# Patient Record
Sex: FEMALE | Race: WHITE | Employment: UNEMPLOYED | URBAN - METROPOLITAN AREA
[De-identification: names, ages, dates, MRNs, and addresses within clinical notes are randomized per-mention and may not be internally consistent; named-entity substitution may affect disease eponyms.]

---

## 2017-01-11 ENCOUNTER — OFFICE VISIT (OUTPATIENT)
Dept: CARDIOLOGY CLINIC | Age: 57
End: 2017-01-11

## 2017-01-11 VITALS
HEART RATE: 80 BPM | HEIGHT: 64 IN | BODY MASS INDEX: 29.88 KG/M2 | SYSTOLIC BLOOD PRESSURE: 118 MMHG | WEIGHT: 175 LBS | DIASTOLIC BLOOD PRESSURE: 70 MMHG

## 2017-01-11 DIAGNOSIS — I50.22 CHRONIC SYSTOLIC CONGESTIVE HEART FAILURE (HCC): ICD-10-CM

## 2017-01-11 DIAGNOSIS — I21.4 NSTEMI (NON-ST ELEVATED MYOCARDIAL INFARCTION) (HCC): ICD-10-CM

## 2017-01-11 DIAGNOSIS — J44.9 CHRONIC OBSTRUCTIVE PULMONARY DISEASE, UNSPECIFIED COPD TYPE (HCC): ICD-10-CM

## 2017-01-11 DIAGNOSIS — R60.0 BILATERAL EDEMA OF LOWER EXTREMITY: ICD-10-CM

## 2017-01-11 DIAGNOSIS — E03.9 ACQUIRED HYPOTHYROIDISM: ICD-10-CM

## 2017-01-11 DIAGNOSIS — E78.5 DYSLIPIDEMIA: ICD-10-CM

## 2017-01-11 DIAGNOSIS — I25.110 CORONARY ARTERY DISEASE INVOLVING NATIVE CORONARY ARTERY OF NATIVE HEART WITH UNSTABLE ANGINA PECTORIS (HCC): Primary | ICD-10-CM

## 2017-01-11 DIAGNOSIS — E66.09 NON MORBID OBESITY DUE TO EXCESS CALORIES: ICD-10-CM

## 2017-01-11 DIAGNOSIS — R06.02 SHORTNESS OF BREATH: ICD-10-CM

## 2017-01-11 DIAGNOSIS — F32.1 MODERATE SINGLE CURRENT EPISODE OF MAJOR DEPRESSIVE DISORDER (HCC): ICD-10-CM

## 2017-01-11 PROCEDURE — 99214 OFFICE O/P EST MOD 30 MIN: CPT | Performed by: INTERNAL MEDICINE

## 2017-01-11 RX ORDER — MIDODRINE HYDROCHLORIDE 5 MG/1
10 TABLET ORAL
Qty: 180 TABLET | Refills: 6 | Status: SHIPPED | OUTPATIENT
Start: 2017-01-11 | End: 2017-03-07 | Stop reason: SDUPTHER

## 2017-01-11 RX ORDER — CARVEDILOL 3.12 MG/1
3.12 TABLET ORAL 2 TIMES DAILY WITH MEALS
Qty: 60 TABLET | Refills: 6 | Status: SHIPPED | OUTPATIENT
Start: 2017-01-11 | End: 2017-03-07 | Stop reason: SDUPTHER

## 2017-02-28 ENCOUNTER — TELEPHONE (OUTPATIENT)
Dept: CARDIOLOGY CLINIC | Age: 57
End: 2017-02-28

## 2017-03-03 ENCOUNTER — TELEPHONE (OUTPATIENT)
Dept: FAMILY MEDICINE CLINIC | Age: 57
End: 2017-03-03

## 2017-03-03 ENCOUNTER — TELEPHONE (OUTPATIENT)
Dept: CARDIOLOGY CLINIC | Age: 57
End: 2017-03-03

## 2017-03-07 DIAGNOSIS — R06.02 SHORTNESS OF BREATH: ICD-10-CM

## 2017-03-07 RX ORDER — CARVEDILOL 3.12 MG/1
3.12 TABLET ORAL 2 TIMES DAILY WITH MEALS
Qty: 180 TABLET | Refills: 3 | Status: SHIPPED | OUTPATIENT
Start: 2017-03-07 | End: 2017-06-16 | Stop reason: SDUPTHER

## 2017-03-07 RX ORDER — FUROSEMIDE 20 MG/1
20 TABLET ORAL DAILY
Qty: 90 TABLET | Refills: 3 | Status: SHIPPED | OUTPATIENT
Start: 2017-03-07 | End: 2017-06-16 | Stop reason: SDUPTHER

## 2017-03-07 RX ORDER — MIDODRINE HYDROCHLORIDE 5 MG/1
10 TABLET ORAL
Qty: 540 TABLET | Refills: 3 | Status: SHIPPED | OUTPATIENT
Start: 2017-03-07 | End: 2017-06-16 | Stop reason: SDUPTHER

## 2017-03-16 ENCOUNTER — TELEPHONE (OUTPATIENT)
Dept: CARDIOLOGY CLINIC | Age: 57
End: 2017-03-16

## 2017-03-28 ENCOUNTER — TELEPHONE (OUTPATIENT)
Dept: CARDIOLOGY CLINIC | Age: 57
End: 2017-03-28

## 2017-04-03 ENCOUNTER — TELEPHONE (OUTPATIENT)
Dept: CARDIOLOGY CLINIC | Age: 57
End: 2017-04-03

## 2017-04-27 ENCOUNTER — OFFICE VISIT (OUTPATIENT)
Dept: FAMILY MEDICINE CLINIC | Age: 57
End: 2017-04-27

## 2017-04-27 VITALS
TEMPERATURE: 96.8 F | HEART RATE: 62 BPM | WEIGHT: 151.2 LBS | SYSTOLIC BLOOD PRESSURE: 122 MMHG | BODY MASS INDEX: 25.95 KG/M2 | OXYGEN SATURATION: 96 % | DIASTOLIC BLOOD PRESSURE: 62 MMHG

## 2017-04-27 DIAGNOSIS — E66.09 NON MORBID OBESITY DUE TO EXCESS CALORIES: ICD-10-CM

## 2017-04-27 DIAGNOSIS — F41.9 ANXIETY: ICD-10-CM

## 2017-04-27 DIAGNOSIS — F32.1 MODERATE SINGLE CURRENT EPISODE OF MAJOR DEPRESSIVE DISORDER (HCC): ICD-10-CM

## 2017-04-27 DIAGNOSIS — J30.89 PERENNIAL ALLERGIC RHINITIS, UNSPECIFIED ALLERGIC RHINITIS TRIGGER: ICD-10-CM

## 2017-04-27 DIAGNOSIS — E03.4 HYPOTHYROIDISM DUE TO ACQUIRED ATROPHY OF THYROID: ICD-10-CM

## 2017-04-27 DIAGNOSIS — I21.4 NSTEMI (NON-ST ELEVATED MYOCARDIAL INFARCTION) (HCC): ICD-10-CM

## 2017-04-27 DIAGNOSIS — R35.0 URINARY FREQUENCY: ICD-10-CM

## 2017-04-27 DIAGNOSIS — I25.110 CORONARY ARTERY DISEASE INVOLVING NATIVE CORONARY ARTERY OF NATIVE HEART WITH UNSTABLE ANGINA PECTORIS (HCC): ICD-10-CM

## 2017-04-27 DIAGNOSIS — J44.9 CHRONIC OBSTRUCTIVE PULMONARY DISEASE, UNSPECIFIED COPD TYPE (HCC): Primary | ICD-10-CM

## 2017-04-27 DIAGNOSIS — F48.2 PSEUDOBULBAR AFFECT: ICD-10-CM

## 2017-04-27 DIAGNOSIS — E87.6 HYPOKALEMIA: ICD-10-CM

## 2017-04-27 PROCEDURE — 36415 COLL VENOUS BLD VENIPUNCTURE: CPT | Performed by: FAMILY MEDICINE

## 2017-04-27 PROCEDURE — 99214 OFFICE O/P EST MOD 30 MIN: CPT | Performed by: FAMILY MEDICINE

## 2017-04-27 RX ORDER — LEVOTHYROXINE SODIUM 0.1 MG/1
TABLET ORAL
Qty: 30 TABLET | Refills: 5 | Status: SHIPPED | OUTPATIENT
Start: 2017-04-27 | End: 2017-10-10 | Stop reason: SDUPTHER

## 2017-04-27 RX ORDER — FLUTICASONE PROPIONATE 220 UG/1
AEROSOL, METERED RESPIRATORY (INHALATION)
Qty: 3 INHALER | Refills: 1 | Status: SHIPPED | OUTPATIENT
Start: 2017-04-27 | End: 2017-09-12 | Stop reason: SDUPTHER

## 2017-04-27 RX ORDER — RISPERIDONE 1 MG/1
TABLET, FILM COATED ORAL
Qty: 60 TABLET | Refills: 5 | Status: SHIPPED | OUTPATIENT
Start: 2017-04-27 | End: 2017-10-10 | Stop reason: SDUPTHER

## 2017-04-27 RX ORDER — POTASSIUM CHLORIDE 20 MEQ/1
10 TABLET, EXTENDED RELEASE ORAL DAILY
Qty: 30 TABLET | Refills: 5 | Status: SHIPPED | OUTPATIENT
Start: 2017-04-27 | End: 2017-10-10 | Stop reason: SDUPTHER

## 2017-04-27 RX ORDER — ATORVASTATIN CALCIUM 40 MG/1
40 TABLET, FILM COATED ORAL DAILY
Qty: 30 TABLET | Refills: 6 | Status: SHIPPED | OUTPATIENT
Start: 2017-04-27 | End: 2017-06-16 | Stop reason: SDUPTHER

## 2017-04-27 RX ORDER — ESCITALOPRAM OXALATE 20 MG/1
TABLET ORAL
Qty: 30 TABLET | Refills: 5 | Status: SHIPPED | OUTPATIENT
Start: 2017-04-27 | End: 2017-10-10 | Stop reason: SDUPTHER

## 2017-04-27 RX ORDER — FLUTICASONE PROPIONATE 50 MCG
2 SPRAY, SUSPENSION (ML) NASAL DAILY
Qty: 1 BOTTLE | Refills: 5 | Status: SHIPPED | OUTPATIENT
Start: 2017-04-27 | End: 2017-09-12 | Stop reason: SDUPTHER

## 2017-04-27 RX ORDER — OXYBUTYNIN CHLORIDE 5 MG/1
5 TABLET ORAL 3 TIMES DAILY
Qty: 90 TABLET | Refills: 5 | Status: SHIPPED | OUTPATIENT
Start: 2017-04-27 | End: 2017-10-10 | Stop reason: SDUPTHER

## 2017-04-27 RX ORDER — AMITRIPTYLINE HYDROCHLORIDE 100 MG/1
100 TABLET, FILM COATED ORAL EVERY EVENING
Refills: 2 | COMMUNITY
Start: 2017-04-17

## 2017-04-27 RX ORDER — MEMANTINE HYDROCHLORIDE AND DONEPEZIL HYDROCHLORIDE 28; 10 MG/1; MG/1
CAPSULE ORAL NIGHTLY
Refills: 2 | COMMUNITY
Start: 2017-04-17

## 2017-04-27 RX ORDER — LORAZEPAM 0.5 MG/1
TABLET ORAL
Refills: 0 | COMMUNITY
Start: 2017-03-31 | End: 2017-04-27 | Stop reason: SDUPTHER

## 2017-04-27 RX ORDER — LORAZEPAM 0.5 MG/1
0.5 TABLET ORAL EVERY 6 HOURS PRN
Qty: 30 TABLET | Refills: 1 | Status: SHIPPED | OUTPATIENT
Start: 2017-04-27 | End: 2017-05-27

## 2017-04-27 RX ORDER — BUSPIRONE HYDROCHLORIDE 15 MG/1
15 TABLET ORAL 2 TIMES DAILY
Qty: 60 TABLET | Refills: 5 | Status: SHIPPED | OUTPATIENT
Start: 2017-04-27 | End: 2017-10-10 | Stop reason: SDUPTHER

## 2017-04-28 LAB
A/G RATIO: 1.1 (CALC) (ref 0.8–2.6)
ALBUMIN SERPL-MCNC: 4.3 GM/DL (ref 3.5–5.2)
ALP BLD-CCNC: 141 U/L (ref 23–144)
ALT SERPL-CCNC: 10 U/L (ref 0–60)
AST SERPL-CCNC: 15 U/L (ref 0–46)
BILIRUB SERPL-MCNC: 0.3 MG/DL (ref 0–1.2)
BUN / CREAT RATIO: 17 (CALC) (ref 7–25)
BUN BLDV-MCNC: 15 MG/DL (ref 3–29)
CALCIUM SERPL-MCNC: 10 MG/DL (ref 8.5–10.5)
CHLORIDE BLD-SCNC: 94 MEQ/L (ref 96–110)
CO2: 24 MEQ/L (ref 19–32)
COPY(IES) SENT TO:: NORMAL
CREAT SERPL-MCNC: 0.9 MG/DL
GFR SERPL CREATININE-BSD FRML MDRD: 73 ML/MIN/1.73M2
GLOBULIN: 3.8 GM/DL (CALC) (ref 1.9–3.6)
GLUCOSE BLD-MCNC: 78 MG/DL
POTASSIUM SERPL-SCNC: 3.9 MEQ/L (ref 3.4–5.3)
SODIUM BLD-SCNC: 139 MEQ/L (ref 135–148)
TOTAL PROTEIN: 8.1 GM/DL (ref 6–8.3)

## 2017-06-15 ENCOUNTER — TELEPHONE (OUTPATIENT)
Dept: CARDIOLOGY CLINIC | Age: 57
End: 2017-06-15

## 2017-06-16 ENCOUNTER — OFFICE VISIT (OUTPATIENT)
Dept: CARDIOLOGY CLINIC | Age: 57
End: 2017-06-16

## 2017-06-16 VITALS
SYSTOLIC BLOOD PRESSURE: 108 MMHG | DIASTOLIC BLOOD PRESSURE: 70 MMHG | BODY MASS INDEX: 26.05 KG/M2 | HEART RATE: 76 BPM | WEIGHT: 152.6 LBS | HEIGHT: 64 IN

## 2017-06-16 DIAGNOSIS — E66.09 NON MORBID OBESITY DUE TO EXCESS CALORIES: ICD-10-CM

## 2017-06-16 DIAGNOSIS — R60.0 BILATERAL EDEMA OF LOWER EXTREMITY: ICD-10-CM

## 2017-06-16 DIAGNOSIS — E03.9 ACQUIRED HYPOTHYROIDISM: ICD-10-CM

## 2017-06-16 DIAGNOSIS — I25.110 CORONARY ARTERY DISEASE INVOLVING NATIVE CORONARY ARTERY OF NATIVE HEART WITH UNSTABLE ANGINA PECTORIS (HCC): Primary | ICD-10-CM

## 2017-06-16 DIAGNOSIS — I21.4 NSTEMI (NON-ST ELEVATED MYOCARDIAL INFARCTION) (HCC): ICD-10-CM

## 2017-06-16 DIAGNOSIS — I50.22 CHRONIC SYSTOLIC CONGESTIVE HEART FAILURE (HCC): ICD-10-CM

## 2017-06-16 DIAGNOSIS — R06.02 SHORTNESS OF BREATH: ICD-10-CM

## 2017-06-16 DIAGNOSIS — F32.1 MODERATE SINGLE CURRENT EPISODE OF MAJOR DEPRESSIVE DISORDER (HCC): ICD-10-CM

## 2017-06-16 PROCEDURE — 99213 OFFICE O/P EST LOW 20 MIN: CPT | Performed by: INTERNAL MEDICINE

## 2017-06-16 RX ORDER — MIDODRINE HYDROCHLORIDE 5 MG/1
10 TABLET ORAL
Qty: 540 TABLET | Refills: 3 | Status: SHIPPED | OUTPATIENT
Start: 2017-06-16 | End: 2017-12-12 | Stop reason: SDUPTHER

## 2017-06-16 RX ORDER — CARVEDILOL 3.12 MG/1
3.12 TABLET ORAL 2 TIMES DAILY WITH MEALS
Qty: 180 TABLET | Refills: 3 | Status: SHIPPED | OUTPATIENT
Start: 2017-06-16 | End: 2017-10-16 | Stop reason: SDUPTHER

## 2017-06-16 RX ORDER — FUROSEMIDE 20 MG/1
20 TABLET ORAL DAILY
Qty: 90 TABLET | Refills: 3 | Status: SHIPPED | OUTPATIENT
Start: 2017-06-16 | End: 2017-11-20 | Stop reason: SDUPTHER

## 2017-06-16 RX ORDER — ATORVASTATIN CALCIUM 40 MG/1
40 TABLET, FILM COATED ORAL DAILY
Qty: 30 TABLET | Refills: 6 | Status: SHIPPED | OUTPATIENT
Start: 2017-06-16 | End: 2017-12-19 | Stop reason: SDUPTHER

## 2017-06-16 RX ORDER — AMITRIPTYLINE HYDROCHLORIDE 100 MG/1
100 TABLET, FILM COATED ORAL EVERY EVENING
Qty: 30 TABLET | Refills: 2 | Status: CANCELLED | OUTPATIENT
Start: 2017-06-16

## 2017-06-16 RX ORDER — ASPIRIN 81 MG/1
81 TABLET ORAL DAILY
Qty: 90 TABLET | Refills: 3 | Status: SHIPPED | OUTPATIENT
Start: 2017-06-16 | End: 2022-10-12 | Stop reason: SDUPTHER

## 2017-06-16 RX ORDER — DONEPEZIL HYDROCHLORIDE 10 MG/1
10 TABLET, FILM COATED ORAL NIGHTLY
Qty: 90 TABLET | Refills: 3 | Status: CANCELLED | OUTPATIENT
Start: 2017-06-16

## 2017-06-21 ENCOUNTER — TELEPHONE (OUTPATIENT)
Dept: CARDIOLOGY CLINIC | Age: 57
End: 2017-06-21

## 2017-06-22 ENCOUNTER — TELEPHONE (OUTPATIENT)
Dept: CARDIOLOGY CLINIC | Age: 57
End: 2017-06-22

## 2017-06-22 RX ORDER — NICOTINE 21 MG/24HR
1 PATCH, TRANSDERMAL 24 HOURS TRANSDERMAL EVERY 24 HOURS
Qty: 28 PATCH | Refills: 1 | OUTPATIENT
Start: 2017-06-22 | End: 2017-10-03 | Stop reason: ALTCHOICE

## 2017-07-10 ENCOUNTER — TELEPHONE (OUTPATIENT)
Dept: CARDIOLOGY CLINIC | Age: 57
End: 2017-07-10

## 2017-07-11 ENCOUNTER — TELEPHONE (OUTPATIENT)
Dept: CARDIOLOGY CLINIC | Age: 57
End: 2017-07-11

## 2017-08-24 ENCOUNTER — OFFICE VISIT (OUTPATIENT)
Dept: FAMILY MEDICINE CLINIC | Age: 57
End: 2017-08-24

## 2017-08-24 VITALS
BODY MASS INDEX: 28.18 KG/M2 | DIASTOLIC BLOOD PRESSURE: 56 MMHG | HEART RATE: 85 BPM | TEMPERATURE: 97 F | SYSTOLIC BLOOD PRESSURE: 108 MMHG | WEIGHT: 164.2 LBS | OXYGEN SATURATION: 96 %

## 2017-08-24 DIAGNOSIS — L89.152 DECUBITUS ULCER OF SACRAL REGION, STAGE 2 (HCC): Primary | ICD-10-CM

## 2017-08-24 DIAGNOSIS — Z12.11 COLON CANCER SCREENING: ICD-10-CM

## 2017-08-24 DIAGNOSIS — L03.317 CELLULITIS OF BUTTOCK: ICD-10-CM

## 2017-08-24 DIAGNOSIS — Z23 NEEDS FLU SHOT: ICD-10-CM

## 2017-08-24 PROCEDURE — 90686 IIV4 VACC NO PRSV 0.5 ML IM: CPT | Performed by: FAMILY MEDICINE

## 2017-08-24 PROCEDURE — 90471 IMMUNIZATION ADMIN: CPT | Performed by: FAMILY MEDICINE

## 2017-08-24 PROCEDURE — 99213 OFFICE O/P EST LOW 20 MIN: CPT | Performed by: FAMILY MEDICINE

## 2017-08-24 RX ORDER — CLOBETASOL PROPIONATE 0.5 MG/G
OINTMENT TOPICAL
Qty: 60 G | Refills: 1 | Status: SHIPPED | OUTPATIENT
Start: 2017-08-24 | End: 2017-09-15 | Stop reason: SDUPTHER

## 2017-08-24 RX ORDER — SULFAMETHOXAZOLE AND TRIMETHOPRIM 800; 160 MG/1; MG/1
1 TABLET ORAL 2 TIMES DAILY
Qty: 20 TABLET | Refills: 0 | Status: SHIPPED | OUTPATIENT
Start: 2017-08-24 | End: 2017-09-03

## 2017-08-24 ASSESSMENT — PATIENT HEALTH QUESTIONNAIRE - PHQ9
SUM OF ALL RESPONSES TO PHQ9 QUESTIONS 1 & 2: 0
1. LITTLE INTEREST OR PLEASURE IN DOING THINGS: 0
SUM OF ALL RESPONSES TO PHQ QUESTIONS 1-9: 0
2. FEELING DOWN, DEPRESSED OR HOPELESS: 0

## 2017-09-15 ENCOUNTER — TELEPHONE (OUTPATIENT)
Dept: FAMILY MEDICINE CLINIC | Age: 57
End: 2017-09-15

## 2017-09-15 DIAGNOSIS — L89.152 DECUBITUS ULCER OF SACRAL REGION, STAGE 2 (HCC): ICD-10-CM

## 2017-09-15 RX ORDER — CLOBETASOL PROPIONATE 0.5 MG/G
OINTMENT TOPICAL
Qty: 60 G | Refills: 1 | Status: SHIPPED | OUTPATIENT
Start: 2017-09-15 | End: 2017-10-10 | Stop reason: ALTCHOICE

## 2017-10-05 ENCOUNTER — HOSPITAL ENCOUNTER (OUTPATIENT)
Dept: SURGERY | Age: 57
Discharge: HOME | End: 2017-10-05
Attending: INTERNAL MEDICINE | Admitting: INTERNAL MEDICINE

## 2017-10-05 VITALS
RESPIRATION RATE: 16 BRPM | WEIGHT: 175.8 LBS | HEART RATE: 156 BPM | BODY MASS INDEX: 30.01 KG/M2 | OXYGEN SATURATION: 100 % | SYSTOLIC BLOOD PRESSURE: 103 MMHG | HEIGHT: 64 IN | DIASTOLIC BLOOD PRESSURE: 84 MMHG

## 2017-10-05 ASSESSMENT — PAIN - FUNCTIONAL ASSESSMENT: PAIN_FUNCTIONAL_ASSESSMENT: 0-10

## 2017-10-05 NOTE — SIGNIFICANT EVENT
I was called to the pts bedside due to and ECG of SVT with her hr in the 130-170's. An EKG was ordered, an IV was started, and Dr. Jan Bates was notified. 5 mg of Metoprolol,  6 mg of Adenosine, and a fluid bolus was started. The pt was asymptomatic with the exception of being hypoptensive. She did respond to Phenylephrine and received 1000 mcg. The decision was made to send the pt via squad to [facility] ER for further evaluation.

## 2017-10-05 NOTE — IP AVS SNAPSHOT
Patient Information     Patient Name DENISHA Barnhart 1960         This is your updated medication list to keep with you all times      ASK your doctor about these medications     amitriptyline 100 MG tablet   Commonly known as:  ELAVIL       apixaban 2.5 MG Tabs tablet   Commonly known as:  ELIQUIS   Take 1 tablet by mouth 2 times daily       aspirin EC 81 MG EC tablet   Take 1 tablet by mouth daily       atorvastatin 40 MG tablet   Commonly known as:  LIPITOR   Take 1 tablet by mouth daily       busPIRone 15 MG tablet   Commonly known as:  BUSPAR   Take 1 tablet by mouth 2 times daily       carvedilol 3.125 MG tablet   Commonly known as:  COREG   Take 1 tablet by mouth 2 times daily (with meals)       clobetasol 0.05 % ointment   Commonly known as:  TEMOVATE   Apply topically 2 times daily.        cyclobenzaprine 10 MG tablet   Commonly known as:  FLEXERIL       donepezil 10 MG tablet   Commonly known as:  ARICEPT       escitalopram 20 MG tablet   Commonly known as:  LEXAPRO   take 1 tablet by mouth once daily       FLOVENT  MCG/ACT inhaler   Generic drug:  fluticasone   INHALE 2 PUFFS BY MOUTH TWICE A DAY       fluticasone 50 MCG/ACT nasal spray   Commonly known as:  FLONASE   INSTILL 2 SPRAYS ONCE DAILY       furosemide 20 MG tablet   Commonly known as:  LASIX   Take 1 tablet by mouth daily       levothyroxine 100 MCG tablet   Commonly known as:  SYNTHROID   take 1 tablet by mouth once daily       memantine ER 28 MG Cp24 extended release capsule   Commonly known as:  NAMENDA XR       metFORMIN 500 MG tablet   Commonly known as:  GLUCOPHAGE   take 1 tablet by mouth twice a day       midodrine 5 MG tablet   Commonly known as:  PROAMATINE   Take 2 tablets by mouth 3 times daily (with meals)       NAMZARIC 28-10 MG Cp24   Generic drug:  Memantine HCl-Donepezil HCl       O-Ring Cushion Misc   Use daily       oxybutynin 5 MG tablet   Commonly known as:  HHSIWZRF Take 1 tablet by mouth 3 times daily       potassium chloride 20 MEQ extended release tablet   Commonly known as:  KLOR-CON M   Take 0.5 tablets by mouth daily       risperiDONE 1 MG tablet   Commonly known as:  RISPERDAL   take 1 tablet by mouth twice a day       tiZANidine 4 MG tablet   Commonly known as:  ZANAFLEX       traMADol 50 MG tablet   Commonly known as:  ULTRAM       zolpidem 10 MG tablet   Commonly known as:  AMBIEN   Take 1 tablet by mouth nightly as needed for Sleep       zonisamide 100 MG capsule   Commonly known as:  Fransico Jiménez

## 2017-10-10 ENCOUNTER — OFFICE VISIT (OUTPATIENT)
Dept: FAMILY MEDICINE CLINIC | Age: 57
End: 2017-10-10

## 2017-10-10 VITALS
TEMPERATURE: 96.4 F | BODY MASS INDEX: 31.27 KG/M2 | SYSTOLIC BLOOD PRESSURE: 114 MMHG | WEIGHT: 182.2 LBS | DIASTOLIC BLOOD PRESSURE: 66 MMHG | HEART RATE: 94 BPM

## 2017-10-10 DIAGNOSIS — F32.1 MODERATE SINGLE CURRENT EPISODE OF MAJOR DEPRESSIVE DISORDER (HCC): ICD-10-CM

## 2017-10-10 DIAGNOSIS — E03.4 HYPOTHYROIDISM DUE TO ACQUIRED ATROPHY OF THYROID: ICD-10-CM

## 2017-10-10 DIAGNOSIS — L89.151 DECUBITUS ULCER OF SACRAL REGION, STAGE 1: ICD-10-CM

## 2017-10-10 DIAGNOSIS — E66.09 NON MORBID OBESITY DUE TO EXCESS CALORIES: ICD-10-CM

## 2017-10-10 DIAGNOSIS — R35.0 URINARY FREQUENCY: ICD-10-CM

## 2017-10-10 DIAGNOSIS — E87.6 HYPOKALEMIA: ICD-10-CM

## 2017-10-10 DIAGNOSIS — F17.200 TOBACCO USE DISORDER: ICD-10-CM

## 2017-10-10 DIAGNOSIS — Z09 HOSPITAL DISCHARGE FOLLOW-UP: ICD-10-CM

## 2017-10-10 DIAGNOSIS — I47.1 SVT (SUPRAVENTRICULAR TACHYCARDIA) (HCC): Primary | ICD-10-CM

## 2017-10-10 DIAGNOSIS — F41.9 ANXIETY: ICD-10-CM

## 2017-10-10 PROCEDURE — 99214 OFFICE O/P EST MOD 30 MIN: CPT | Performed by: FAMILY MEDICINE

## 2017-10-10 RX ORDER — BUSPIRONE HYDROCHLORIDE 15 MG/1
15 TABLET ORAL 2 TIMES DAILY
Qty: 60 TABLET | Refills: 5 | Status: SHIPPED | OUTPATIENT
Start: 2017-10-10 | End: 2019-10-02

## 2017-10-10 RX ORDER — OXYBUTYNIN CHLORIDE 5 MG/1
5 TABLET ORAL 3 TIMES DAILY
Qty: 90 TABLET | Refills: 5 | Status: SHIPPED | OUTPATIENT
Start: 2017-10-10 | End: 2018-05-15 | Stop reason: SDUPTHER

## 2017-10-10 RX ORDER — ESCITALOPRAM OXALATE 20 MG/1
TABLET ORAL
Qty: 30 TABLET | Refills: 5 | Status: SHIPPED | OUTPATIENT
Start: 2017-10-10 | End: 2019-04-02 | Stop reason: ALTCHOICE

## 2017-10-10 RX ORDER — POTASSIUM CHLORIDE 20 MEQ/1
10 TABLET, EXTENDED RELEASE ORAL DAILY
Qty: 30 TABLET | Refills: 5 | Status: SHIPPED | OUTPATIENT
Start: 2017-10-10 | End: 2018-02-21 | Stop reason: SDUPTHER

## 2017-10-10 RX ORDER — RISPERIDONE 1 MG/1
TABLET, FILM COATED ORAL
Qty: 60 TABLET | Refills: 5 | Status: SHIPPED | OUTPATIENT
Start: 2017-10-10 | End: 2018-05-15 | Stop reason: SDUPTHER

## 2017-10-10 RX ORDER — LEVOTHYROXINE SODIUM 0.1 MG/1
TABLET ORAL
Qty: 30 TABLET | Refills: 5 | Status: SHIPPED | OUTPATIENT
Start: 2017-10-10 | End: 2018-05-15 | Stop reason: SDUPTHER

## 2017-10-10 RX ORDER — CLOBETASOL PROPIONATE 0.5 MG/G
CREAM TOPICAL
Qty: 60 G | Refills: 1 | Status: ON HOLD | OUTPATIENT
Start: 2017-10-10 | End: 2018-02-14 | Stop reason: HOSPADM

## 2017-10-10 RX ORDER — NICOTINE 21 MG/24HR
1 PATCH, TRANSDERMAL 24 HOURS TRANSDERMAL EVERY 24 HOURS
Qty: 30 PATCH | Refills: 0 | Status: SHIPPED | OUTPATIENT
Start: 2017-10-10 | End: 2017-11-09 | Stop reason: SDUPTHER

## 2017-10-10 ASSESSMENT — PATIENT HEALTH QUESTIONNAIRE - PHQ9
2. FEELING DOWN, DEPRESSED OR HOPELESS: 0
SUM OF ALL RESPONSES TO PHQ QUESTIONS 1-9: 0
1. LITTLE INTEREST OR PLEASURE IN DOING THINGS: 0
SUM OF ALL RESPONSES TO PHQ9 QUESTIONS 1 & 2: 0

## 2017-10-10 NOTE — PATIENT INSTRUCTIONS
We are committed to providing you the best care possible. If you receive a survey after visiting one of our offices, please take time to share your experience concerning your physician office visit. These surveys are confidential and no health information about you is shared. We are eager to improve for you and we are counting on your feedback to help make that happen. Patient Education        Pressure Sores: Care Instructions  Your Care Instructions    A pressure sore is an injury to the skin caused by constant pressure. These soresalso called decubitus ulcers or bedsoresmay happen when you lie in bed or sit in a wheelchair for a long time. The constant pressure blocks the blood supply to the skin. This causes skin cells to die and creates a sore. Pressure sores usually occur over bony areas, such as the hips, lower back, elbows, heels, and shoulders. They also can occur in places where the skin folds over on itself. You may have mild redness or open sores that are harder to heal.  Good care at home can help heal pressure sores. You or your caregiver needs to check your skin every day for sores. You need good nutrition and plenty of fluids to keep your skin healthy and prevent new pressure sores. Follow-up care is a key part of your treatment and safety. Be sure to make and go to all appointments, and call your doctor if you are having problems. It's also a good idea to know your test results and keep a list of the medicines you take. How can you care for yourself at home? · If your doctor prescribed a medicated ointment or cream, use it exactly as prescribed. Call your doctor if you think you are having a problem with your medicine. · Wash pressure sores every day, or as often as your doctor recommends. Most tap water is safe, but follow the advice of your doctor or nurse. He or she may recommend that you use a saline solution.  This is a salt and water solution that you can buy over the

## 2017-10-10 NOTE — PROGRESS NOTES
Patient here to follow-up on recent hospitalization for supraventricular tachycardia. She was being prepped for colonoscopy, was found to be in SVT. That time she did not have any symptoms but heart rate was 130-150. She was given adenosine ×2 which did not help, and she was sent to the emergency room. She spontaneously converted in the emergency room but was admitted. She was seen by cardiology on the hospital and was started on diltiazem. She states she has never had palpitations. She is not having lightheadedness or chest pain or shortness of breath. Obesity: Patient complains of obesity. Patient cites health, increased physical ability, self-image as reasons for wanting to lose weight. She had lost 31 pounds in  six months on metformin, but she has gained much of it back. Exercising 7 days per week. Depression: Patient complains of depression with anxiety. She complains of depressed mood, insomnia and psychomotor agitation. Onset was approximately several years ago, controlled since that time. She denies current suicidal and homicidal plan or intent. Family history significant for no psychiatric illness. Possible organic causes contributing are: none. Risk factors: previous episode of depression Previous treatment includes Lexapro, Risperdal, and prn ativan ( about 3 times per month). She complains of the following side effects from the treatment: none. Hyperlipidemia- denies myalgias or nausea on zocor. Hypothyroidism: Patient presents for evaluation of thyroid function. Symptoms consist of denies fatigue, weight changes, heat/cold intolerance, bowel/skin changes or CVS symptoms. Symptoms have present for several years. The symptoms are mild. The problem has been controlled. Previous thyroid studies include TSH. The hypothyroidism is due to hypothyroidism. Dementia: She is here for evaluation and treatment of cognitive problems. Primary caregiver is patient.  The family and the patient identify problems with changes in short term memory. Family and patient report problems with none. Family and patient are concerned about  none, however, they are not concerned about medication errors, wandering, driving, cooking and inability to maintain adequate nutrition. Medication administration: patient self medicates    Functional Assessment:   Activities of Daily Living (ADLs):    She is independent in the following: ambulation, bathing and hygiene, feeding, continence, grooming, toileting and dressing  Requires assistance with the following: none    COPD: Patient complains of dyspnea. Symptoms began several years ago. Symptoms chronic dyspnea does worsen with exertion. Sputum is clear  in small amounts. Fever has been absent. Patient uses 1 pillows at night. Patient can walk 200 feet before resting. Patient currently is not on home oxygen therapy. Dia Alonzo Respiratory history: COPD     Urinary Incontinence: Patient complains of urinary incontinence. This has been present for several years. She leaks urine with with urge. Patient describes the symptoms as  urge to urinate with little or no warning. Factors associated with symptoms include worse since CVA. Evaluation to date includes none. Treatment to date includes oxybutinin, which was effective. New painful sore on bilateral buttocks for several weeks. Patient states that she sits most of the time in the same position. She doesn't move around much. She states she noted the pain which has progressively become worse over the last several weeks. There has been minimal drainage from these lesions. She denies fever, chills, nausea, vomiting. The previous lesion resolved with steroid cream.    ROS: No TIA's or unusual headaches, no dysphagia. No prolonged cough. No dyspnea or chest pain on exertion. No abdominal pain, change in bowel habits, black or bloody stools. No urinary tract symptoms. No new or unusual musculoskeletal symptoms.   Normal

## 2017-10-16 ENCOUNTER — OFFICE VISIT (OUTPATIENT)
Dept: CARDIOLOGY CLINIC | Age: 57
End: 2017-10-16

## 2017-10-16 VITALS
HEIGHT: 64 IN | DIASTOLIC BLOOD PRESSURE: 86 MMHG | WEIGHT: 181.6 LBS | HEART RATE: 92 BPM | BODY MASS INDEX: 31 KG/M2 | SYSTOLIC BLOOD PRESSURE: 138 MMHG

## 2017-10-16 DIAGNOSIS — E78.5 DYSLIPIDEMIA: ICD-10-CM

## 2017-10-16 DIAGNOSIS — G56.03 BILATERAL CARPAL TUNNEL SYNDROME: ICD-10-CM

## 2017-10-16 DIAGNOSIS — I47.1 SVT (SUPRAVENTRICULAR TACHYCARDIA) (HCC): ICD-10-CM

## 2017-10-16 DIAGNOSIS — I25.110 CORONARY ARTERY DISEASE INVOLVING NATIVE CORONARY ARTERY OF NATIVE HEART WITH UNSTABLE ANGINA PECTORIS (HCC): Primary | ICD-10-CM

## 2017-10-16 DIAGNOSIS — E03.9 ACQUIRED HYPOTHYROIDISM: ICD-10-CM

## 2017-10-16 DIAGNOSIS — I50.22 CHRONIC SYSTOLIC CONGESTIVE HEART FAILURE (HCC): ICD-10-CM

## 2017-10-16 DIAGNOSIS — J44.9 CHRONIC OBSTRUCTIVE PULMONARY DISEASE, UNSPECIFIED COPD TYPE (HCC): ICD-10-CM

## 2017-10-16 PROCEDURE — 99213 OFFICE O/P EST LOW 20 MIN: CPT | Performed by: INTERNAL MEDICINE

## 2017-10-16 RX ORDER — CARVEDILOL 6.25 MG/1
6.25 TABLET ORAL 2 TIMES DAILY WITH MEALS
Qty: 180 TABLET | Refills: 5 | Status: SHIPPED | OUTPATIENT
Start: 2017-10-16 | End: 2017-11-20 | Stop reason: SDUPTHER

## 2017-10-16 NOTE — PATIENT INSTRUCTIONS
CAD:Yes   clinically stable. Patient is on optimal medical regimen ( see medication list above )  -     CORONARY ARTERY DISEASE: Patient is currently  asymptomatic from CAD. - changes in  treatment:   no           - Testing ordered:  no  [facility] classification: 1  [place] RISK SCORE:  [place] RISK SCORE:      VHD: No significant VHD noted  DYSLIPIDEMIA: Patient's profile is at / near [employer],                                                              Tolerating current medical regimen wellyes,                                                              See most recent Lab values in Labs section above. OTHER RELEVANT DIAGNOSIS:as noted in patient's active problem list:B/L LEG EDEMA: Check US to R/O DVT. TESTS ORDERED: Venous US of the legs. All previously ordered tests reviewed. ARRHYTHMIAS: SVT                                                              Patient  To see Rahul Kirkpatrick for possible ablation  MEDICATIONS: CPM   Office f/u in three months. Primary/secondary prevention is the goal by aggressive risk modification, healthy and therapeutic life style changes for cardiovascular risk reduction. Various goals are discussed and multiple questions answered.

## 2017-10-16 NOTE — PROGRESS NOTES
times daily 60 tablet 5    risperiDONE (RISPERDAL) 1 MG tablet take 1 tablet by mouth twice a day 60 tablet 5    levothyroxine (SYNTHROID) 100 MCG tablet take 1 tablet by mouth once daily 30 tablet 5    oxybutynin (DITROPAN) 5 MG tablet Take 1 tablet by mouth 3 times daily 90 tablet 5    potassium chloride (KLOR-CON M) 20 MEQ extended release tablet Take 0.5 tablets by mouth daily 30 tablet 5    metFORMIN (GLUCOPHAGE) 500 MG tablet take 1 tablet by mouth twice a day 60 tablet 5    nicotine (NICODERM CQ) 21 MG/24HR Place 1 patch onto the skin every 24 hours 30 patch 0    clobetasol (TEMOVATE) 0.05 % cream Apply topically 2 times daily.  60 g 1    diltiazem (CARDIZEM CD) 120 MG extended release capsule Take 1 capsule by mouth daily 30 capsule 0    FLOVENT  MCG/ACT inhaler INHALE 2 PUFFS BY MOUTH TWICE A DAY 36 g 1    fluticasone (FLONASE) 50 MCG/ACT nasal spray INSTILL 2 SPRAYS ONCE DAILY 16 g 1    atorvastatin (LIPITOR) 40 MG tablet Take 1 tablet by mouth daily 30 tablet 6    midodrine (PROAMATINE) 5 MG tablet Take 2 tablets by mouth 3 times daily (with meals) 540 tablet 3    carvedilol (COREG) 3.125 MG tablet Take 1 tablet by mouth 2 times daily (with meals) 180 tablet 3    furosemide (LASIX) 20 MG tablet Take 1 tablet by mouth daily 90 tablet 3    aspirin EC 81 MG EC tablet Take 1 tablet by mouth daily 90 tablet 3    amitriptyline (ELAVIL) 100 MG tablet Take 100 mg by mouth every evening  2    NAMZARIC 28-10 MG CP24   2    apixaban (ELIQUIS) 2.5 MG TABS tablet Take 1 tablet by mouth 2 times daily 180 tablet 3    zonisamide (ZONEGRAN) 100 MG capsule Take 200-300 mg by mouth nightly       traMADol (ULTRAM) 50 MG tablet every 6 hours as needed       tiZANidine (ZANAFLEX) 4 MG tablet Take 4 mg by mouth 2 times daily       cyclobenzaprine (FLEXERIL) 10 MG tablet Take 20 mg by mouth 3 times daily as needed       zolpidem (AMBIEN) 10 MG tablet Take 1 tablet by mouth nightly as needed for Sleep 30 tablet 4     No current facility-administered medications for this visit. Allergies: Other  Past Medical History:   Diagnosis Date    Abnormal nuclear stress test     [facility] syndrome) [facility]     Arterial ischemic stroke, MCA (middle cerebral artery), left, acute ([facility] [address]     CAD (coronary artery disease)     CHF (congestive heart failure) ([facility]     COPD (chronic obstructive pulmonary disease) ([facility])     CVA (cerebral vascular accident) ([facility] [address] 4/6/2014    H/O cardiovascular stress test 5/19/2016    treadmill    History of left heart catheterization 4/17/2016    Severe 2 vessel disease, but 3 vessel disease. 2.5x30 Resolute Stent placed to the LAD. Successful angio-seal deployment w/ excellent results.  Hx of cardiovascular stress test 6/22/2015    lexiscan-scar,EF47%    Hx of echocardiogram 4/8/2014    VALDEMAR: EF 40-45%. Right and left atrium/ventricles are normal. Mitral/Tricuspid valves normal. Mild aortic va;lve stenosis.  Hx of echocardiogram 4/18/2016    EF 35-40%. Borderline LA enlargement. 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 Mild MR/TR with trace aortic insufficiency. Mild pulmonary HTN.      Hyperlipemia     Hypertension, essential, benign     Nicotine dependence     quit 2014    NSTEMI (non-ST elevated myocardial infarction) ([facility] [address] 4/17/2015    Post PTCA     Thyroid disease     Unstable angina ([facility])      Past Surgical History:   Procedure Laterality Date    BREAST SURGERY Bilateral     fibrocystic breast mass    CARPAL TUNNEL RELEASE Bilateral     2015      As reviewed   Family History   Problem Relation Age of Onset    Stroke Mother     Stroke Father      Social History   Substance Use Topics    Smoking status: Current Every Day Smoker     Packs/day: 0.75     Types: Cigarettes     Start date: 4/6/2014    Smokeless tobacco: Never Used      Comment: reviewed 4/26/16    Alcohol use No      Comment: hx of

## 2017-10-16 NOTE — LETTER
Cardiology [address] [facility] [place] [address]  Phone: [phone]  Fax: [phone]    Juliann Clark MD        October 16, 2017     Darby Corona MD  [address] [address]    Patient: Rosi Magana  MR Number: [id number]  YOB: 1960  Date of Visit: 10/16/2017    Dear Dr. Darby Corona:    Thank you for the request for consultation for Lul Hurley to me for the evaluation of CAD / SVT. Below are the relevant portions of my assessment and plan of care. If you have questions, please do not hesitate to call me. I look forward to following Fabi Marlow along with you.     Sincerely,        Juliann Clark MD

## 2017-10-18 ENCOUNTER — TELEPHONE (OUTPATIENT)
Dept: CARDIOLOGY CLINIC | Age: 57
End: 2017-10-18

## 2017-10-23 ENCOUNTER — PROCEDURE VISIT (OUTPATIENT)
Dept: CARDIOLOGY CLINIC | Age: 57
End: 2017-10-23

## 2017-10-23 DIAGNOSIS — G56.03 BILATERAL CARPAL TUNNEL SYNDROME: ICD-10-CM

## 2017-10-23 DIAGNOSIS — I25.110 CORONARY ARTERY DISEASE INVOLVING NATIVE CORONARY ARTERY OF NATIVE HEART WITH UNSTABLE ANGINA PECTORIS (HCC): ICD-10-CM

## 2017-10-23 DIAGNOSIS — I47.1 SVT (SUPRAVENTRICULAR TACHYCARDIA) (HCC): ICD-10-CM

## 2017-10-23 DIAGNOSIS — E78.5 DYSLIPIDEMIA: ICD-10-CM

## 2017-10-23 DIAGNOSIS — I50.22 CHRONIC SYSTOLIC CONGESTIVE HEART FAILURE (HCC): ICD-10-CM

## 2017-10-23 DIAGNOSIS — M79.89 LEG SWELLING: Primary | ICD-10-CM

## 2017-10-23 DIAGNOSIS — E03.9 ACQUIRED HYPOTHYROIDISM: ICD-10-CM

## 2017-10-23 DIAGNOSIS — J44.9 CHRONIC OBSTRUCTIVE PULMONARY DISEASE, UNSPECIFIED COPD TYPE (HCC): ICD-10-CM

## 2017-10-23 PROCEDURE — 93970 EXTREMITY STUDY: CPT | Performed by: INTERNAL MEDICINE

## 2017-10-31 ENCOUNTER — TELEPHONE (OUTPATIENT)
Dept: CARDIOLOGY CLINIC | Age: 57
End: 2017-10-31

## 2017-11-01 ENCOUNTER — INITIAL CONSULT (OUTPATIENT)
Dept: CARDIOLOGY CLINIC | Age: 57
End: 2017-11-01

## 2017-11-01 VITALS
SYSTOLIC BLOOD PRESSURE: 138 MMHG | DIASTOLIC BLOOD PRESSURE: 90 MMHG | HEIGHT: 64 IN | OXYGEN SATURATION: 99 % | BODY MASS INDEX: 32.27 KG/M2 | HEART RATE: 100 BPM | WEIGHT: 189 LBS

## 2017-11-01 DIAGNOSIS — I47.1 PSVT (PAROXYSMAL SUPRAVENTRICULAR TACHYCARDIA) (HCC): ICD-10-CM

## 2017-11-01 PROCEDURE — G8427 DOCREV CUR MEDS BY ELIG CLIN: HCPCS | Performed by: INTERNAL MEDICINE

## 2017-11-01 PROCEDURE — G8484 FLU IMMUNIZE NO ADMIN: HCPCS | Performed by: INTERNAL MEDICINE

## 2017-11-01 PROCEDURE — G8417 CALC BMI ABV UP PARAM F/U: HCPCS | Performed by: INTERNAL MEDICINE

## 2017-11-01 PROCEDURE — 93000 ELECTROCARDIOGRAM COMPLETE: CPT | Performed by: INTERNAL MEDICINE

## 2017-11-01 PROCEDURE — 99215 OFFICE O/P EST HI 40 MIN: CPT | Performed by: INTERNAL MEDICINE

## 2017-11-01 PROCEDURE — 3014F SCREEN MAMMO DOC REV: CPT | Performed by: INTERNAL MEDICINE

## 2017-11-01 PROCEDURE — 3017F COLORECTAL CA SCREEN DOC REV: CPT | Performed by: INTERNAL MEDICINE

## 2017-11-01 NOTE — PROGRESS NOTES
Electrophysiology Consult Note      Reason for consultation:  SVT    Chief complaint : Palpitations    Referring physician:        Primary care physician: Kaylie Cuellar MD      History of Present Illness:     Chief Complaint   Patient presents with   Taylor Bosworth Dr. Gilda Proud patient here to discuss possible SVT ablation. On 10/5/17 patient was to have colonoscopy but her HR was 177. She was given 6mg Adenosine, 5mg Metoprolol, and 100mcg of Neosynephrine. Patient states she has been feeling her heart \"racing\" 2-3 times a week now. She was started on Cardizem 120 mg daily. She mentions some shortness of breath with tachycardia episodes and with minimal exertion. She had CVA 4/16/2014 from unknown cause and MI x2.  Shortness of Breath     She denies chest pain, dizziness, and edema. Past medical history:   Past Medical History:   Diagnosis Date    Abnormal nuclear stress test     [facility] coronary syndrome) [facility]     Arterial ischemic stroke, MCA (middle cerebral artery), left, acute ([facility] [address]     CAD (coronary artery disease)     CHF (congestive heart failure) ([facility]     COPD (chronic obstructive pulmonary disease) ([facility]     CVA (cerebral vascular accident) ([facility] [address] 4/6/2014    H/O cardiovascular stress test 5/19/2016    treadmill    History of left heart catheterization 4/17/2016    Severe 2 vessel disease, but 3 vessel disease. 2.5x30 Resolute Stent placed to the LAD. Successful angio-seal deployment w/ excellent results.  Hx of cardiovascular stress test 6/22/2015    lexiscan-scar,EF47%    Hx of echocardiogram 4/8/2014    VALDEMAR: EF 40-45%. Right and left atrium/ventricles are normal. Mitral/Tricuspid valves normal. Mild aortic va;lve stenosis.  Hx of echocardiogram 4/18/2016    EF 35-40%. Borderline LA enlargement. 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  Mild MR/TR with trace aortic insufficiency. Mild pulmonary HTN.  Hyperlipemia     Hypertension, essential, benign     Nicotine dependence     quit 2014    NSTEMI (non-ST elevated myocardial infarction) ([facility] [address] 4/17/2015    Post PTCA     Thyroid disease     Unstable angina (HCC)     Venous doppler 10/23/2017    Normal venous duplex examination of the legs bilaterally with normal venous Doppler signals noted throughout. No evidence of thrombophlebitis is noted bilaterally in the deep and superficial veins of the legs. Surgical history :   Past Surgical History:   Procedure Laterality Date    BREAST SURGERY Bilateral     fibrocystic breast mass    CARPAL TUNNEL RELEASE Bilateral     2015       Family history:   Family History   Problem Relation Age of Onset    Stroke Mother     Stroke Father        Social history :  reports that she has been smoking Cigarettes. She started smoking about 3 years ago. She has been smoking about 0.75 packs per day. She has never used smokeless tobacco. She reports that she does not drink alcohol or use drugs.     Allergies   Allergen Reactions    Other Other (See Comments)     Vicryl sutures, Pt does not heal with Vicryl sutures         Current Outpatient Prescriptions on File Prior to Visit   Medication Sig Dispense Refill    carvedilol (COREG) 6.25 MG tablet Take 1 tablet by mouth 2 times daily (with meals) 180 tablet 5    escitalopram (LEXAPRO) 20 MG tablet take 1 tablet by mouth once daily 30 tablet 5    busPIRone (BUSPAR) 15 MG tablet Take 1 tablet by mouth 2 times daily 60 tablet 5    risperiDONE (RISPERDAL) 1 MG tablet take 1 tablet by mouth twice a day 60 tablet 5    levothyroxine (SYNTHROID) 100 MCG tablet take 1 tablet by mouth once daily 30 tablet 5    oxybutynin (DITROPAN) 5 MG tablet Take 1 tablet by mouth 3 times daily 90 tablet 5    potassium chloride (KLOR-CON M) 20 MEQ extended release tablet Take 0.5 tablets by mouth daily 30 tablet 5    metFORMIN (GLUCOPHAGE) 500 MG tablet take 1 tablet by mouth twice a day 60 tablet 5    clobetasol (TEMOVATE) 0.05 % cream Apply topically 2 times daily. 60 g 1    FLOVENT  MCG/ACT inhaler INHALE 2 PUFFS BY MOUTH TWICE A DAY 36 g 1    atorvastatin (LIPITOR) 40 MG tablet Take 1 tablet by mouth daily 30 tablet 6    midodrine (PROAMATINE) 5 MG tablet Take 2 tablets by mouth 3 times daily (with meals) 540 tablet 3    furosemide (LASIX) 20 MG tablet Take 1 tablet by mouth daily 90 tablet 3    aspirin EC 81 MG EC tablet Take 1 tablet by mouth daily 90 tablet 3    amitriptyline (ELAVIL) 100 MG tablet Take 100 mg by mouth every evening  2    NAMZARIC 28-10 MG CP24   2    zonisamide (ZONEGRAN) 100 MG capsule Take 200-300 mg by mouth nightly       traMADol (ULTRAM) 50 MG tablet every 6 hours as needed       tiZANidine (ZANAFLEX) 4 MG tablet Take 4 mg by mouth 2 times daily       cyclobenzaprine (FLEXERIL) 10 MG tablet Take 20 mg by mouth 3 times daily as needed       zolpidem (AMBIEN) 10 MG tablet Take 1 tablet by mouth nightly as needed for Sleep 30 tablet 4     No current facility-administered medications on file prior to visit. Review of Systems:   Review of Systems   Constitutional: Positive for activity change and fatigue. Negative for chills and fever. HENT: Negative for congestion, ear pain and tinnitus. Eyes: Negative for photophobia, pain and visual disturbance. Respiratory: Positive for shortness of breath. Negative for cough, chest tightness and wheezing. Cardiovascular: Positive for palpitations. Negative for chest pain and leg swelling. Gastrointestinal: Negative for abdominal pain, blood in stool, constipation, diarrhea, nausea and vomiting. Endocrine: Negative for cold intolerance and heat intolerance. Genitourinary: Negative for dysuria, flank pain and hematuria. Musculoskeletal: Positive for arthralgias. Negative for back pain, myalgias and neck stiffness.    Skin: Negative for color change and rash. Allergic/Immunologic: Negative for food allergies. Neurological: Negative for light-headedness, numbness and headaches. Hematological: Does not bruise/bleed easily. Psychiatric/Behavioral: Negative for agitation, behavioral problems and confusion. Physical Examination:    BP (!) 138/90   Pulse 100   Ht 5' 4\" (1.626 m)   Wt 189 lb (85.7 kg)   SpO2 99%   BMI 32.44 kg/m²    Wt Readings from Last 3 Encounters:   11/01/17 189 lb (85.7 kg)   10/16/17 181 lb 9.6 oz (82.4 kg)   10/10/17 182 lb 3.2 oz (82.6 kg)     Body mass index is 32.44 kg/m². Physical Exam   Constitutional: She is oriented to person, place, and time and well-developed, well-nourished, and in no distress. HENT:   Head: Normocephalic and atraumatic. Eyes: Conjunctivae and EOM are normal. Pupils are equal, round, and reactive to light. Right eye exhibits no discharge. Neck: Normal range of motion. No JVD present. No thyromegaly present. Cardiovascular: Normal rate, regular rhythm and normal heart sounds. Exam reveals no friction rub. No murmur heard. Pulmonary/Chest: Effort normal and breath sounds normal. No stridor. No respiratory distress. She has no wheezes. Abdominal: Soft. Bowel sounds are normal. She exhibits no distension. There is no tenderness. Musculoskeletal: Normal range of motion. She exhibits no edema or tenderness. Neurological: She is alert and oriented to person, place, and time. No cranial nerve deficit. Skin: Skin is warm and dry. No rash noted. No erythema.    Psychiatric: Mood and affect normal.         CBC:   Lab Results   Component Value Date    WBC 7.0 10/05/2017    HGB 9.7 10/05/2017    HCT 33.1 10/05/2017     10/05/2017     Lipids:   Lab Results   Component Value Date    CHOL 149 11/02/2016    TRIG 168 (H) 11/02/2016    HDL 43 11/02/2016    LDLDIRECT 104 (H) 07/14/2015     PT/INR:   Lab Results   Component Value Date    INR 0.96 04/16/2016

## 2017-11-01 NOTE — PROGRESS NOTES
Patient here in office and educated on EP Study poss SVT Ablation , schedule for 11/30/17 @ 1:00, with arrival @ 11:00, @ [facility]; risk explained; and consents signed. Also copy of orders given for labs and CXR due 11/28/17 at [facility]. Instruction given to patient to :  NPO after midnight the night before procedure; call hospital at [phone] to pre-register. May take rest of morning meds of procedure. Hold Coreg,& Cardizem 2 days prior to procedure. Hold Metformin the day before, the day of and two days after procedure. Hold Lexapro the morning before procedure. Patient voiced understanding. Copies of consent & info sheet given to Lyndsey for scanning.

## 2017-11-01 NOTE — PROGRESS NOTES
Subjective:      Patient ID: Erich Beltran is a 56 y.o. female.     HPI    Review of Systems    Objective:   Physical Exam    Assessment:      ***      Plan:      ***

## 2017-11-01 NOTE — LETTER
Emilee Zamora     PROCEDURE: Electrophysiology Study/Supraventricular Tachycardia Ablation      Date of Procedure: 17  Time: 1:00   Arrival Time: 11:00    Patient Name: Alf Douglas   : 1960   MRN# [id number]    HOSPITAL: [facility])    Call to [facility] at [phone]  2 days before your procedure    x Please have blood work and chest-x-ray done 17 at [facility]     x Please do not have anything by mouth after midnight prior to or 8 hours  before the procedure.    x You may take your medications with a sip of water in the morning before your procedure or take them with you unless listed below. X  Hold Carvedilol (Coreg) 2 days prior to the procedure. X  Hold Diltiazem (Cardizem) 2 days prior to the procedure.     x Hold anti-anxiety medications the day if the procedure. (Lexapro)    x  Hold Metformin 24 hours before the procedure 17, the day of 17 you may restart metformin 2 days after the procedure on 12/3/17. Patient Signature:  _______________________  Staff: Silvana Zamora     PROCEDURE:  Electrophysiology Study/Supraventricular Tachycardia Ablation     Date of Procedure: 17 Time: 1:00 Arrival Time: 11:00    Patient Name: Alf Douglas   : 1960   MRN# [id number]    Day of Procedure Cath Lab Holding area Pre op Orders:    ? IV peripheral saline lock (preferred left arm). ? Type & Screen STAT on arrival.  ? Insert Rea catheter (male patients >>please use coude rea catheter). ? Female patients <=48years of age >> Please do urine HCG test.  ? Diabetic patients >> Accu check Blood sugar check. ? Document home medications in EPIC and include date and time of last dose.  ? NPO  ? Notify Dr. Katie Richardson of abnormal lab results. ? Chest Prep> Clip hair anterior chest and posterior back. ? Groin Prep> Clip hair bilateral groins. Physician Signature:_____________________________Date:___________                                           [facility] ([facility]) Informed Consent for Anesthesia/Sedation, Surgery, Invasive Procedures, and other High-risk Interventions and Medication use     *This consent is applicable for 30 days following patient signature*    Procedure(s)   Shalini SCHUSTER authorize, Dr. Justyna Haider   and the associate(s) or assistant(s) of his/her choice, to perform the following procedure(s):Electrophysiology Study/Supraventricular Tachycardia Ablation    I know that unexpected conditions may require additional or different procedures than those above. I authorize the above named practitioner(s) perform these as necessary and desirable. This is based on the practitioners professional judgment. The above named practitioner has discussed the above procedure(s) with me, including:  ? Potential benefits, including likelihood of success of the procedure(s) goals  ? Risks  ? Side effects, risk of death, and risk of infection  ? Any potential problems that might occur during recuperation or healing post-procedure  ? Reasonable alternatives  ? Risks of NOT performing the procedure(s)    I acknowledge that no warranty or guarantee has been made to the results the procedure(s). I consent to the above named practitioner(s) providing additional services to me as deemed reasonable and necessary, including but not limited to:    ? Use of medications for anesthesia or sedation. ? All anesthesia and sedation carry risks. My practitioner has discussed my anticipated anesthesia and/or sedation and the risks of using, risk of not using, benefits, side effects, and alternatives. ? Use of pathology  ? I authorize [facility]) to dispose of tissues, specimens or organs when pathology is complete.

## 2017-11-02 DIAGNOSIS — I25.110 CORONARY ARTERY DISEASE INVOLVING NATIVE CORONARY ARTERY OF NATIVE HEART WITH UNSTABLE ANGINA PECTORIS (HCC): ICD-10-CM

## 2017-11-02 DIAGNOSIS — I47.1 SVT (SUPRAVENTRICULAR TACHYCARDIA) (HCC): Primary | ICD-10-CM

## 2017-11-02 NOTE — TELEPHONE ENCOUNTER
Called and advised patient that Dr Karyna Cheney & Dr Sara Stanley wanted to increase her Eliquis to 5 mg BID. She states that is fine and asked that we call into to [facility] [place] [facility]"" in [facility]. Routed to Dr. Karyna Cheney to sign Rx.

## 2017-11-08 ENCOUNTER — TELEPHONE (OUTPATIENT)
Dept: CARDIOLOGY CLINIC | Age: 57
End: 2017-11-08

## 2017-11-08 RX ORDER — DILTIAZEM HYDROCHLORIDE 120 MG/1
120 CAPSULE, COATED, EXTENDED RELEASE ORAL DAILY
Qty: 30 CAPSULE | Refills: 3 | Status: SHIPPED | OUTPATIENT
Start: 2017-11-08 | End: 2017-12-12 | Stop reason: SDUPTHER

## 2017-11-08 NOTE — TELEPHONE ENCOUNTER
Jada Macias states pt is to stay on Cardizem so I notified pt that I reordered it from [facility] in [facility] as she requested. Tushar Hayes

## 2017-11-08 NOTE — TELEPHONE ENCOUNTER
Patient called with questions regarding cardizem, she is out, should she continue taking this medication?  If so, can she have a refill sent in?

## 2017-11-09 ENCOUNTER — TELEPHONE (OUTPATIENT)
Dept: FAMILY MEDICINE CLINIC | Age: 57
End: 2017-11-09

## 2017-11-09 DIAGNOSIS — F17.200 TOBACCO USE DISORDER: ICD-10-CM

## 2017-11-09 RX ORDER — NICOTINE 21 MG/24HR
1 PATCH, TRANSDERMAL 24 HOURS TRANSDERMAL EVERY 24 HOURS
Qty: 30 PATCH | Refills: 0 | Status: SHIPPED | OUTPATIENT
Start: 2017-11-09 | End: 2017-11-30 | Stop reason: DRUGHIGH

## 2017-11-09 NOTE — TELEPHONE ENCOUNTER
Prescription for 21 mg nicotine patches sent to her pharmacy. Patient to call back in one month for refill or change in strength.

## 2017-11-16 ENCOUNTER — TELEPHONE (OUTPATIENT)
Dept: FAMILY MEDICINE CLINIC | Age: 57
End: 2017-11-16

## 2017-11-16 DIAGNOSIS — J30.89 PERENNIAL ALLERGIC RHINITIS: ICD-10-CM

## 2017-11-16 RX ORDER — FLUTICASONE PROPIONATE 50 MCG
SPRAY, SUSPENSION (ML) NASAL
Qty: 16 G | Refills: 3 | Status: SHIPPED | OUTPATIENT
Start: 2017-11-16 | End: 2017-11-21 | Stop reason: SDUPTHER

## 2017-11-19 ASSESSMENT — ENCOUNTER SYMPTOMS
ABDOMINAL PAIN: 0
WHEEZING: 0
SHORTNESS OF BREATH: 1
BACK PAIN: 0
BLOOD IN STOOL: 0
CHEST TIGHTNESS: 0
EYE PAIN: 0
COLOR CHANGE: 0
COUGH: 0
VOMITING: 0
CONSTIPATION: 0
PHOTOPHOBIA: 0
NAUSEA: 0
DIARRHEA: 0

## 2017-11-20 ENCOUNTER — TELEPHONE (OUTPATIENT)
Dept: FAMILY MEDICINE CLINIC | Age: 57
End: 2017-11-20

## 2017-11-20 DIAGNOSIS — J30.89 PERENNIAL ALLERGIC RHINITIS: ICD-10-CM

## 2017-11-20 DIAGNOSIS — R35.0 URINARY FREQUENCY: ICD-10-CM

## 2017-11-20 NOTE — TELEPHONE ENCOUNTER
Pt called to let Dr. Juan J Ojeda know she is no longer using [address] [facility].  She also requested a refill on her ditropan.

## 2017-11-21 RX ORDER — FLUTICASONE PROPIONATE 50 MCG
SPRAY, SUSPENSION (ML) NASAL
Qty: 16 G | Refills: 3 | Status: SHIPPED | OUTPATIENT
Start: 2017-11-21 | End: 2018-03-15 | Stop reason: SDUPTHER

## 2017-11-22 RX ORDER — CARVEDILOL 6.25 MG/1
6.25 TABLET ORAL 2 TIMES DAILY WITH MEALS
Qty: 180 TABLET | Refills: 3 | Status: SHIPPED | OUTPATIENT
Start: 2017-11-22 | End: 2017-12-12 | Stop reason: SDUPTHER

## 2017-11-22 RX ORDER — FUROSEMIDE 20 MG/1
20 TABLET ORAL DAILY
Qty: 90 TABLET | Refills: 3 | Status: SHIPPED | OUTPATIENT
Start: 2017-11-22 | End: 2017-12-12 | Stop reason: SDUPTHER

## 2017-11-27 DIAGNOSIS — E03.4 HYPOTHYROIDISM DUE TO ACQUIRED ATROPHY OF THYROID: ICD-10-CM

## 2017-11-27 RX ORDER — LEVOTHYROXINE SODIUM 0.1 MG/1
TABLET ORAL
Qty: 30 TABLET | Refills: 5 | Status: CANCELLED | OUTPATIENT
Start: 2017-11-27

## 2017-11-27 NOTE — TELEPHONE ENCOUNTER
Please call her pharmacy to see if she has refills on this medication that was prescribed in October.

## 2017-11-28 LAB
ANION GAP SERPL CALCULATED.3IONS-SCNC: 12 MMOL/L (ref 4–16)
APTT: 30.6 SECONDS (ref 21.2–33)
BUN BLDV-MCNC: 11 MG/DL (ref 6–23)
CALCIUM SERPL-MCNC: 9.5 MG/DL (ref 8.3–10.6)
CHLORIDE BLD-SCNC: 99 MMOL/L (ref 99–110)
CO2: 29 MMOL/L (ref 21–32)
CREAT SERPL-MCNC: 1.2 MG/DL (ref 0.6–1.1)
GFR AFRICAN AMERICAN: 56 ML/MIN/1.73M2
GFR NON-AFRICAN AMERICAN: 47 ML/MIN/1.73M2
GLUCOSE FASTING: 100 MG/DL (ref 70–99)
HCT VFR BLD CALC: 27.7 % (ref 37–47)
HEMOGLOBIN: 8.2 GM/DL (ref 12.5–16)
INR BLD: 0.94 INDEX
MAGNESIUM: 1.8 MG/DL (ref 1.8–2.4)
MCH RBC QN AUTO: 24.3 PG (ref 27–31)
MCHC RBC AUTO-ENTMCNC: 29.6 % (ref 32–36)
MCV RBC AUTO: 82.2 FL (ref 78–100)
PDW BLD-RTO: 18.9 % (ref 11.7–14.9)
PHOSPHORUS: 3.6 MG/DL (ref 2.5–4.9)
PLATELET # BLD: 339 K/CU MM (ref 140–440)
PMV BLD AUTO: 10.5 FL (ref 7.5–11.1)
POTASSIUM SERPL-SCNC: 4.3 MMOL/L (ref 3.5–5.1)
PROTHROMBIN TIME: 10.7 SECONDS
RBC # BLD: 3.37 M/CU MM (ref 4.2–5.4)
SODIUM BLD-SCNC: 140 MMOL/L (ref 135–145)
WBC # BLD: 7.1 K/CU MM (ref 4–10.5)

## 2017-11-29 ENCOUNTER — TELEPHONE (OUTPATIENT)
Dept: CARDIOLOGY CLINIC | Age: 57
End: 2017-11-29

## 2017-11-29 NOTE — TELEPHONE ENCOUNTER
Spoke with patient advised her that Dr Halima Anna wants her to stop Eliquis and we are cancelling procedure for tomorrow. Her hemoglobin is only 8.2 and Dr Halima Anna is concerned with that. Dr Halima Anna has sent messages for Dr Ophelia Moss and Dr Anton Clarke he is waiting on reply. Dr Halima Anna then took phone and spoke with patient for about 15 minutes.

## 2017-11-30 ENCOUNTER — TELEPHONE (OUTPATIENT)
Dept: CARDIOLOGY CLINIC | Age: 57
End: 2017-11-30

## 2017-11-30 ENCOUNTER — HOSPITAL ENCOUNTER (OUTPATIENT)
Dept: CARDIAC CATH/INVASIVE PROCEDURES | Age: 57
Discharge: HOME | End: 2017-11-28
Attending: INTERNAL MEDICINE | Admitting: INTERNAL MEDICINE

## 2017-11-30 DIAGNOSIS — Z01.818 PREOP TESTING: ICD-10-CM

## 2017-11-30 RX ORDER — NICOTINE 21 MG/24HR
1 PATCH, TRANSDERMAL 24 HOURS TRANSDERMAL EVERY 24 HOURS
Qty: 30 PATCH | Refills: 0 | Status: SHIPPED | OUTPATIENT
Start: 2017-11-30 | End: 2018-01-15 | Stop reason: SDUPTHER

## 2017-12-08 ENCOUNTER — TELEPHONE (OUTPATIENT)
Dept: CARDIOLOGY CLINIC | Age: 57
End: 2017-12-08

## 2017-12-09 ENCOUNTER — TELEPHONE (OUTPATIENT)
Dept: CARDIOLOGY CLINIC | Age: 57
End: 2017-12-09

## 2017-12-09 NOTE — TELEPHONE ENCOUNTER
Left message for patient advised her that next available for her procedure will be January 11 2018 @ 1pm arrival of 11 am. Please call Michelle Craig back on Monday to advise if the date and time work for her.

## 2017-12-11 ENCOUNTER — TELEPHONE (OUTPATIENT)
Dept: CARDIOLOGY CLINIC | Age: 57
End: 2017-12-11

## 2017-12-11 ASSESSMENT — LIFESTYLE VARIABLES: SMOKING_STATUS: 1

## 2017-12-11 NOTE — ANESTHESIA PRE-OP
tablet 5    levothyroxine (SYNTHROID) 100 MCG tablet take 1 tablet by mouth once daily 30 tablet 5    oxybutynin (DITROPAN) 5 MG tablet Take 1 tablet by mouth 3 times daily 90 tablet 5    potassium chloride (KLOR-CON M) 20 MEQ extended release tablet Take 0.5 tablets by mouth daily 30 tablet 5    metFORMIN (GLUCOPHAGE) 500 MG tablet take 1 tablet by mouth twice a day 60 tablet 5    clobetasol (TEMOVATE) 0.05 % cream Apply topically 2 times daily. 60 g 1    FLOVENT  MCG/ACT inhaler INHALE 2 PUFFS BY MOUTH TWICE A DAY 36 g 1    atorvastatin (LIPITOR) 40 MG tablet Take 1 tablet by mouth daily 30 tablet 6    midodrine (PROAMATINE) 5 MG tablet Take 2 tablets by mouth 3 times daily (with meals) 540 tablet 3    aspirin EC 81 MG EC tablet Take 1 tablet by mouth daily 90 tablet 3    amitriptyline (ELAVIL) 100 MG tablet Take 100 mg by mouth every evening  2    NAMZARIC 28-10 MG CP24   2    zonisamide (ZONEGRAN) 100 MG capsule Take 200-300 mg by mouth nightly       traMADol (ULTRAM) 50 MG tablet every 6 hours as needed       tiZANidine (ZANAFLEX) 4 MG tablet Take 4 mg by mouth 2 times daily       cyclobenzaprine (FLEXERIL) 10 MG tablet Take 20 mg by mouth 3 times daily as needed       zolpidem (AMBIEN) 10 MG tablet Take 1 tablet by mouth nightly as needed for Sleep 30 tablet 4     No current facility-administered medications for this encounter. Allergies:     Allergies   Allergen Reactions    Other Other (See Comments)     Vicryl sutures, Pt does not heal with Vicryl sutures         Problem List:    Patient Active Problem List   Diagnosis Code    Cerebral infarction ([facility] [address] I63.9    COPD (chronic obstructive pulmonary disease) ([facility]) J44.9    Depression F32.9    Insomnia G47.00    Aphagia R13.0    Hypothyroidism E03.9    Anemia D64.9    CHF (congestive heart failure) ([facility]) I50.9    Edema, lower extremity R60.0    Obesity E66.9    Abnormal nuclear stress test R94.39    Bilateral carpal

## 2017-12-11 NOTE — ADDENDUM NOTE
Encounter addended by: Parul Devine MA on: 12/11/2017  1:55 PM<BR>    Actions taken: Letter status changed

## 2017-12-11 NOTE — LETTER
Ursula Body     Dr. Kenya Joshi     PROCEDURE: Electrophysiology Study/Supraventricular Tachycardia Ablation      Date of Procedure: 2018  Time: 1:00  Arrival Time: 11:00    Patient Name: Ignacio Chang   : 1960   MRN# [id number]    HOSPITAL: [facility])    Call to Pre[facility] at [phone]  2 days before your procedure    x Please have blood work and chest-x-ray done 2018 at [facility]. x Please do not have anything by mouth after midnight prior to or 8 hours  before the procedure.    x You may take your medications with a sip of water in the morning before your procedure or take them with you unless listed below. X  Hold Carvedilol (Coreg) & Diltiazem (Cardizem) 2 days prior to the procedure. X  Hold anti-anxiety medications the day if the procedure. (lexapro)    X   Hold Metformin the day before procedure 1/10/18, the day of 2018, may restart 2 days after on 18. X  Hold Eliquis evening dose night before procedure and morning dose of procedure. Patient Signature:  _______________________  Staff: Rogelia Goldberg     Staff Signature:_____________________________________                      Ursula Body     Dr. Kenya Joshi     PROCEDURE:  Electrophysiology Study/Supraventricular Tachycardia Ablation     Date of Procedure: 18 Time: 1:00 Arrival Time: 11:00    Patient Name: Ignacio Chang   : 1960   MRN# [id number]    Day of Procedure Cath Lab Holding area Pre op Orders:    ? IV peripheral saline lock (preferred left arm). ? Type & Screen STAT on arrival.  ? Insert Rea catheter (male patients >>please use coude rea catheter). ? Female patients <=48years of age >> Please do urine HCG test.  ? Diabetic patients >> Accu check Blood sugar check.   ? Document home medications in EPIC and include date and time of last dose.  ? NPO ? Notify Dr. Evelyn Gimenez of abnormal lab results. ? Chest Prep> Clip hair anterior chest and posterior back. ? Groin Prep> Clip hair bilateral groins. Physician Signature:_____________________________Date:___________                                               [facility] ([facility]) Informed Consent for Anesthesia/Sedation, Surgery, Invasive Procedures, and other High-risk Interventions and Medication use     *This consent is applicable for 30 days following patient signature*    Procedure(s)   IYesenia authorize, Dr. Melissa Rodriguez   and the associate(s) or assistant(s) of his/her choice, to perform the following procedure(s):Electrophysiology Study/Supraventricular Tachycardia Ablation    I know that unexpected conditions may require additional or different procedures than those above. I authorize the above named practitioner(s) perform these as necessary and desirable. This is based on the practitioners professional judgment. The above named practitioner has discussed the above procedure(s) with me, including:  ? Potential benefits, including likelihood of success of the procedure(s) goals  ? Risks  ? Side effects, risk of death, and risk of infection  ? Any potential problems that might occur during recuperation or healing post-procedure  ? Reasonable alternatives  ? Risks of NOT performing the procedure(s)    I acknowledge that no warranty or guarantee has been made to the results the procedure(s). I consent to the above named practitioner(s) providing additional services to me as deemed reasonable and necessary, including but not limited to:    ? Use of medications for anesthesia or sedation. ? All anesthesia and sedation carry risks. My practitioner has discussed my anticipated anesthesia and/or sedation and the risks of using, risk of not using, benefits, side effects, and alternatives.       ? Use of pathology ? I authorize [facility]) to dispose of tissues, specimens or organs when pathology is complete. ? Use of radiology  ? A contrast agent may be required for radiology procedures. My practitioner has advised me of the risks of using, risks of not using, benefits, side effects, and alternatives. ? Observers or use of photography, video/audio recording, or televising of the procedure(s). This is for medical, scientific, or educational purposes. This includes appropriate portions of my body. My identity will not be revealed. ? I consent to release of my social security number and other identifying information to [employer] (FDA), and the supplier/, if I receive tissue, a device, or implant. This is to track the tissue, device, or implant for defect, recall, infection, etc.     ? Use of blood and/or blood products, if needed, through my hospital stay. My practitioner has advised me of the risks of using, risks of not using, benefits, side effects, and alternatives. ___ I do NOT want Blood or Blood products given. (Complete separate  refusal form)    Code Status (sergei one):  ___ I do NOT HAVE a DNR order. I am a Full-code.   I will receive CPR, intubation,  chest compressions, medications, and/or other life saving measures if I have a  cardiac or respiratory arrest.    ___ I have a Do Not Resuscitate (DNR)order.   (sergei one below)  ___  I rescind my DNR for surgery and immediate post-operative period through Phase 2 recovery. This means, for that time period, I will be a Full-code and receive CPR, intubation, chest compressions, medications, and/or other life saving measures, if I have a cardiac or respiratory arrest.    ___ I WANT to keep my DNR in effect during my procedure(s) and immediate post-operative recovery period through Phase 2 recovery.   (Complete separate refusal form)

## 2017-12-11 NOTE — LETTER
Ashlyn Search     Dr. Barbra Rapp     PROCEDURE: Electrophysiology Study/Supraventricular Tachycardia Ablation      Date of Procedure: 2018  Time: 1:00  Arrival Time: 11:00 am    Patient Name: Jose Roberto Brody   : 1960  MRN# [id number]    HOSPITAL: [facility])    Call to Pre-[place] at [phone]  2 days before your procedure    x Please have blood work  done 2018 at [facility] or [facility]. x Please do not have anything by mouth after midnight prior to or 8 hours  before the procedure.    x You may take your medications with a sip of water in the morning before your procedure or take them with you unless listed below. X  Hold Carvedilol (Coreg) & Diltiazem (Cardizem) 2 days prior to the procedure. X  Hold anti-anxiety medications the day if the procedure. (Lexapro)    X   Hold Metformin the day before procedure 1/10, the day of procedure  and  may restart on 18. Patient Signature:  _______________________  Staff: Tony Pinedo     Staff Signature:_____________________________________                          Ashlyn Search     Dr. Barbra Rapp     PROCEDURE:  Electrophysiology Study/Supraventricular Tachycardia Ablation     Date of Procedure: 18 Time: 1:00 Arrival Time: 11:00 am    Patient Name: Jose Roberto Brody   : 1960   MRN# [id number]    Day of Procedure Cath Lab Holding area Pre op Orders:    ? IV peripheral saline lock (preferred left arm). ? Type & Screen STAT on arrival.  ? Insert Rea catheter (male patients >>please use coude rea catheter). ? Female patients <=48years of age >> Please do urine HCG test.  ? Diabetic patients >> Accu check Blood sugar check. ? Document home medications in EPIC and include date and time of last dose.  ? NPO  ? Notify Dr. Magnus Isbell of abnormal lab results. ? Chest Prep> Clip hair anterior chest and posterior back. ? Groin Prep> Clip hair bilateral groins. Physician Signature:_____________________________Date:___________                                               [facility] ([facility]) Informed Consent for Anesthesia/Sedation, Surgery, Invasive Procedures, and other High-risk Interventions and Medication use     *This consent is applicable for 30 days following patient signature*    Procedure(s)   Faby SCHUSTER authorize, Dr. Jeremy Francisco   and the associate(s) or assistant(s) of his/her choice, to perform the following procedure(s):Electrophysiology Study/Supraventricular Tachycardia Ablation    I know that unexpected conditions may require additional or different procedures than those above. I authorize the above named practitioner(s) perform these as necessary and desirable. This is based on the practitioners professional judgment. The above named practitioner has discussed the above procedure(s) with me, including:  ? Potential benefits, including likelihood of success of the procedure(s) goals  ? Risks  ? Side effects, risk of death, and risk of infection  ? Any potential problems that might occur during recuperation or healing post-procedure  ? Reasonable alternatives  ? Risks of NOT performing the procedure(s)    I acknowledge that no warranty or guarantee has been made to the results the procedure(s). I consent to the above named practitioner(s) providing additional services to me as deemed reasonable and necessary, including but not limited to:    ? Use of medications for anesthesia or sedation. ? All anesthesia and sedation carry risks. My practitioner has discussed my anticipated anesthesia and/or sedation and the risks of using, risk of not using, benefits, side effects, and alternatives. ? Use of pathology  ? I authorize [facility]) to dispose of tissues, specimens or organs when pathology is complete. ? Use of radiology  ? A contrast agent may be required for radiology procedures. My practitioner has advised me of the risks of using, risks of not using, benefits, side effects, and alternatives. ? Observers or use of photography, video/audio recording, or televising of the procedure(s). This is for medical, scientific, or educational purposes. This includes appropriate portions of my body. My identity will not be revealed. ? I consent to release of my social security number and other identifying information to [employer] (FDA), and the supplier/, if I receive tissue, a device, or implant. This is to track the tissue, device, or implant for defect, recall, infection, etc.     ? Use of blood and/or blood products, if needed, through my hospital stay. My practitioner has advised me of the risks of using, risks of not using, benefits, side effects, and alternatives. ___ I do NOT want Blood or Blood products given. (Complete separate  refusal form)    Code Status (sergei one):  ___ I do NOT HAVE a DNR order. I am a Full-code.   I will receive CPR, intubation,  chest compressions, medications, and/or other life saving measures if I have a  cardiac or respiratory arrest.    ___ I have a Do Not Resuscitate (DNR)order.   (sergei one below)  ___  I rescind my DNR for surgery and immediate post-operative period through Phase 2 recovery. This means, for that time period, I will be a Full-code and receive CPR, intubation, chest compressions, medications, and/or other life saving measures, if I have a cardiac or respiratory arrest.    ___ I WANT to keep my DNR in effect during my procedure(s) and immediate post-operative recovery period through Phase 2 recovery. (Complete separate refusal form)     This form has been fully explained to me. I understand its contents.       Patients Signature: ___________________________Date: ________  Time: ________ 10/16/17 181 lb 9.6 oz (82.4 kg)        Insurance: Payor: [employer] / Plan: [employer] / Product Type: *No Product type* /     Date of Procedure: 1/11/18 Time: 1:00 Arrival Time: 11:00    Diagnosis:  Supraventricular Tachycardia  Allergies:    Allergies   Allergen Reactions    Other Other (See Comments)     Vicryl sutures, Pt does not heal with Vicryl sutures          1) Call [facility] scheduling ([phone]) or [address] [facility]     PHONE OR   INSTANT MESSAGE  2) PREAUTHORIZATION NUMBER:    Spoke to:      From date:     expiration date:        Samuel Lomax

## 2017-12-12 DIAGNOSIS — R06.02 SHORTNESS OF BREATH: ICD-10-CM

## 2017-12-12 RX ORDER — CARVEDILOL 6.25 MG/1
6.25 TABLET ORAL 2 TIMES DAILY WITH MEALS
Qty: 60 TABLET | Refills: 6 | Status: SHIPPED | OUTPATIENT
Start: 2017-12-12 | End: 2018-01-16 | Stop reason: SDUPTHER

## 2017-12-12 RX ORDER — FUROSEMIDE 20 MG/1
20 TABLET ORAL DAILY
Qty: 30 TABLET | Refills: 6 | Status: SHIPPED | OUTPATIENT
Start: 2017-12-12 | End: 2018-01-16 | Stop reason: SDUPTHER

## 2017-12-12 RX ORDER — DILTIAZEM HYDROCHLORIDE 120 MG/1
120 CAPSULE, COATED, EXTENDED RELEASE ORAL DAILY
Qty: 30 CAPSULE | Refills: 6 | Status: SHIPPED | OUTPATIENT
Start: 2017-12-12 | End: 2018-01-16 | Stop reason: SDUPTHER

## 2017-12-12 RX ORDER — MIDODRINE HYDROCHLORIDE 5 MG/1
10 TABLET ORAL
Qty: 180 TABLET | Refills: 6 | Status: SHIPPED | OUTPATIENT
Start: 2017-12-12 | End: 2018-06-11 | Stop reason: SDUPTHER

## 2017-12-13 ENCOUNTER — HOSPITAL ENCOUNTER (OUTPATIENT)
Dept: SURGERY | Age: 57
Discharge: HOME | End: 2017-12-13
Attending: INTERNAL MEDICINE | Admitting: INTERNAL MEDICINE

## 2017-12-13 VITALS
OXYGEN SATURATION: 100 % | TEMPERATURE: 97 F | BODY MASS INDEX: 32.44 KG/M2 | WEIGHT: 190 LBS | HEIGHT: 64 IN | RESPIRATION RATE: 16 BRPM | SYSTOLIC BLOOD PRESSURE: 117 MMHG | HEART RATE: 86 BPM | DIASTOLIC BLOOD PRESSURE: 76 MMHG

## 2017-12-13 RX ORDER — SODIUM CHLORIDE, SODIUM LACTATE, POTASSIUM CHLORIDE, CALCIUM CHLORIDE 600; 310; 30; 20 MG/100ML; MG/100ML; MG/100ML; MG/100ML
INJECTION, SOLUTION INTRAVENOUS CONTINUOUS
Status: DISCONTINUED | OUTPATIENT
Start: 2017-12-13 | End: 2017-12-14 | Stop reason: HOSPADM

## 2017-12-13 RX ADMIN — SODIUM CHLORIDE, SODIUM LACTATE, POTASSIUM CHLORIDE, CALCIUM CHLORIDE: 600; 310; 30; 20 INJECTION, SOLUTION INTRAVENOUS at 08:10

## 2017-12-13 ASSESSMENT — PAIN SCALES - GENERAL
PAINLEVEL_OUTOF10: 0
PAINLEVEL_OUTOF10: 0

## 2017-12-13 ASSESSMENT — LIFESTYLE VARIABLES: SMOKING_STATUS: 1

## 2017-12-13 ASSESSMENT — PAIN - FUNCTIONAL ASSESSMENT: PAIN_FUNCTIONAL_ASSESSMENT: 0-10

## 2017-12-13 NOTE — H&P
I have examined the patient immediately before the procedure and there is no change in the previous history or physical exam.There is no history of sleep apnea, snoring, or stridor. There has been no  previous adverse experience with sedation/anesthesia.   ASA Class: 3AIRWAY Class: 3

## 2017-12-13 NOTE — ANESTHESIA POST-OP
Anesthesia Post-op Note    Patient: Lilia Thapa  MRN: [id number]  YOB: 1960  Date of evaluation: 12/13/2017  Time:  10:44 AM     Procedure(s) Performed: colonoscopy    Last Vitals: /76   Pulse 86   Temp 97 °F (36.1 °C) (Temporal)   Resp 16   Ht 5' 4\" (1.626 m)   Wt 190 lb (86.2 kg)   SpO2 100%   BMI 32.61 kg/m²     Arianna Phase I:      Arianna Phase II: Arianna Score: 10    Anesthesia Post Evaluation    Final anesthesia type: MAC  Patient location during evaluation: PACU  Patient participation: complete - patient participated  Level of consciousness: awake and alert  Airway patency: patent  Nausea & Vomiting: no nausea  Complications: no  Cardiovascular status: hemodynamically stable  Respiratory status: acceptable  Hydration status: euvolemic        Suman Saunders DO  10:44 AM

## 2017-12-13 NOTE — ANESTHESIA PRE-OP
Department of Anesthesiology  Preprocedure Note       Name:  Yesica Kapoor   Age:  56 y.o.  :  1960                                          MRN:  [id number]         Date:  2017      Surgeon:    Procedure: colonoscopy    Medications prior to admission:   Prior to Admission medications    Medication Sig Start Date End Date Taking?  Authorizing Provider   carvedilol (COREG) 6.25 MG tablet Take 1 tablet by mouth 2 times daily (with meals) 17 Yes Allie Moreno MD   furosemide (LASIX) 20 MG tablet Take 1 tablet by mouth daily 17 Yes Allie Moreno MD   midodrine (PROAMATINE) 5 MG tablet Take 2 tablets by mouth 3 times daily (with meals) 17 Yes Allie Moreno MD   diltiazem (CARDIZEM CD) 120 MG extended release capsule Take 1 capsule by mouth daily 17  Yes Allie Moreno MD   donepezil (ARICEPT) 10 MG tablet Take 20 mg by mouth nightly   Yes Historical Provider, MD   nicotine (NICODERM CQ) 14 MG/24HR Place 1 patch onto the skin every 24 hours 17 Yes Valda Boxer, MD   apixaban Olivia Hurley) 5 MG TABS tablet Take 1 tablet by mouth 2 times daily 17  Yes Allie Moreno MD   fluticasone [facility]) 50 MCG/ACT nasal spray INSTILL 2 SPRAYS ONCE DAILY 17  Yes Valda Boxer, MD   escitalopram (LEXAPRO) 20 MG tablet take 1 tablet by mouth once daily 10/10/17  Yes Valda Boxer, MD   busPIRone (BUSPAR) 15 MG tablet Take 1 tablet by mouth 2 times daily 10/10/17  Yes Valda Boxer, MD   risperiDONE (RISPERDAL) 1 MG tablet take 1 tablet by mouth twice a day 10/10/17  Yes Valda Boxer, MD   levothyroxine (SYNTHROID) 100 MCG tablet take 1 tablet by mouth once daily 10/10/17  Yes Valda Boxer, MD   oxybutynin (DITROPAN) 5 MG tablet Take 1 tablet by mouth 3 times daily 10/10/17  Yes Valda Boxer, MD   potassium chloride (KLOR-CON M) 20 MEQ extended release tablet Take 0.5 tablets by mouth daily 10/10/17  Yes Valda Boxer, MD metFORMIN (GLUCOPHAGE) 500 MG tablet take 1 tablet by mouth twice a day 10/10/17  Yes Shira Guerrero MD   [facility] 220 MCG/ACT inhaler INHALE 2 PUFFS BY MOUTH TWICE A DAY 9/12/17  Yes Shira Guerrero MD   atorvastatin (LIPITOR) 40 MG tablet Take 1 tablet by mouth daily 6/16/17  Yes Huan Quintanilla MD   aspirin EC 81 MG EC tablet Take 1 tablet by mouth daily 6/16/17  Yes Huan Quintanilla MD   amitriptyline (ELAVIL) 100 MG tablet Take 100 mg by mouth every evening 4/17/17  Yes Historical Provider, MD   [facility] 28-10 MG CP24 Take by mouth nightly  4/17/17  Yes Historical Provider, MD   zonisamide (ZONEGRAN) 100 MG capsule Take 200-300 mg by mouth nightly as needed  5/20/16  Yes Historical Provider, MD   traMADol (ULTRAM) 50 MG tablet every 6 hours as needed  6/27/16  Yes Historical Provider, MD   tiZANidine (ZANAFLEX) 4 MG tablet Take 4 mg by mouth 2 times daily as needed  3/28/16  Yes Historical Provider, MD   cyclobenzaprine (FLEXERIL) 10 MG tablet Take 20 mg by mouth 3 times daily as needed  5/19/16  Yes Historical Provider, MD   zolpidem (AMBIEN) 10 MG tablet Take 1 tablet by mouth nightly as needed for Sleep 6/28/16  Yes Shira Guerrero MD   clobetasol (TEMOVATE) 0.05 % cream Apply topically 2 times daily.  10/10/17   Shira Guerrero MD       Current medications:    Current Outpatient Prescriptions   Medication Sig Dispense Refill    carvedilol (COREG) 6.25 MG tablet Take 1 tablet by mouth 2 times daily (with meals) 60 tablet 6    furosemide (LASIX) 20 MG tablet Take 1 tablet by mouth daily 30 tablet 6    midodrine (PROAMATINE) 5 MG tablet Take 2 tablets by mouth 3 times daily (with meals) 180 tablet 6    diltiazem (CARDIZEM CD) 120 MG extended release capsule Take 1 capsule by mouth daily 30 capsule 6    donepezil (ARICEPT) 10 MG tablet Take 20 mg by mouth nightly      nicotine (NICODERM CQ) 14 MG/24HR Place 1 patch onto the skin every 24 hours 30 patch 0    apixaban (ELIQUIS) 5 MG TABS tablet Take 1 Allergen Reactions    Other Other (See Comments)     Vicryl sutures, Pt does not heal with Vicryl sutures         Problem List:    Patient Active Problem List   Diagnosis Code    Cerebral infarction ([facility] I63.9    COPD (chronic obstructive pulmonary disease) ([facility]) J44.9    Depression F32.9    Insomnia G47.00    Aphagia R13.0    Hypothyroidism E03.9    Anemia D64.9    CHF (congestive heart failure) ([facility]) I50.9    Edema, lower extremity R60.0    Obesity E66.9    Abnormal nuclear stress test R94.39    Bilateral carpal tunnel syndrome G56.03    Chronic systolic congestive heart failure ([facility]) I50.22    Hypokalemia E87.6    Urge incontinence of urine N39.41    Ischemic chest pain ([facility]) I20.9    Unstable angina ([facility]) I20.0    NSTEMI (non-ST elevated myocardial infarction) ([facility]) I21.4    Chronic obstructive pulmonary disease ([facility]) J44.9    Non morbid obesity due to excess calories E66.09    LV (left ventricular) mural thrombus [id number]    CAD (coronary artery disease) I25.10    Dyslipidemia E78.5    Pseudobulbar affect F48.2    SVT (supraventricular tachycardia) ([facility]) I47.1       Past Medical History:        Diagnosis Date    Abnormal nuclear stress test     ACS (acute coronary syndrome) ([facility])     Arterial ischemic stroke, MCA (middle cerebral artery), left, acute ([facility])     CAD (coronary artery disease)     CHF (congestive heart failure) ([facility])     COPD (chronic obstructive pulmonary disease) ([facility])     CVA (cerebral vascular accident) ([facility] 4/6/2014    H/O cardiovascular stress test 5/19/2016    treadmill    History of left heart catheterization 4/17/2016    Severe 2 vessel disease, but 3 vessel disease. 2.5x30 Resolute Stent placed to the LAD. Successful angio-seal deployment w/ excellent results.  Hx of cardiovascular stress test 6/22/2015    lexiscan-scar,EF47%    Hx of echocardiogram 4/8/2014    VALDEMAR: EF 40-45%. Right and left atrium/ventricles are normal. Mitral/Tricuspid valves

## 2017-12-14 ENCOUNTER — TELEPHONE (OUTPATIENT)
Dept: CARDIOLOGY CLINIC | Age: 57
End: 2017-12-14

## 2017-12-14 NOTE — TELEPHONE ENCOUNTER
Per Phillip Duque., OK for patient to restart Eliquis today. Patient is aware and voiced understanding.

## 2017-12-18 ENCOUNTER — TELEPHONE (OUTPATIENT)
Dept: CARDIOLOGY CLINIC | Age: 57
End: 2017-12-18

## 2017-12-18 NOTE — TELEPHONE ENCOUNTER
I advised patient that her paper work is all set to be signed for ablation scheduled 1/11/18 and that she can stop in anytime this week to sign. She voiced understanding and stated she will stop in tomorrow to sign.

## 2017-12-19 ENCOUNTER — TELEPHONE (OUTPATIENT)
Dept: FAMILY MEDICINE CLINIC | Age: 57
End: 2017-12-19

## 2017-12-19 DIAGNOSIS — I25.110 CORONARY ARTERY DISEASE INVOLVING NATIVE CORONARY ARTERY OF NATIVE HEART WITH UNSTABLE ANGINA PECTORIS (HCC): ICD-10-CM

## 2017-12-19 DIAGNOSIS — I21.4 NSTEMI (NON-ST ELEVATED MYOCARDIAL INFARCTION) (HCC): ICD-10-CM

## 2017-12-19 RX ORDER — ATORVASTATIN CALCIUM 40 MG/1
40 TABLET, FILM COATED ORAL DAILY
Qty: 30 TABLET | Refills: 1 | Status: SHIPPED | OUTPATIENT
Start: 2017-12-19 | End: 2017-12-22 | Stop reason: SDUPTHER

## 2017-12-21 ENCOUNTER — TELEPHONE (OUTPATIENT)
Dept: CARDIOLOGY CLINIC | Age: 57
End: 2017-12-21

## 2017-12-22 DIAGNOSIS — I21.4 NSTEMI (NON-ST ELEVATED MYOCARDIAL INFARCTION) (HCC): ICD-10-CM

## 2017-12-22 DIAGNOSIS — I25.110 CORONARY ARTERY DISEASE INVOLVING NATIVE CORONARY ARTERY OF NATIVE HEART WITH UNSTABLE ANGINA PECTORIS (HCC): ICD-10-CM

## 2017-12-22 RX ORDER — ATORVASTATIN CALCIUM 40 MG/1
40 TABLET, FILM COATED ORAL DAILY
Qty: 30 TABLET | Refills: 0 | Status: SHIPPED | OUTPATIENT
Start: 2017-12-22 | End: 2018-01-16 | Stop reason: SDUPTHER

## 2018-01-03 ENCOUNTER — HOSPITAL ENCOUNTER (OUTPATIENT)
Dept: LAB | Age: 58
Discharge: HOME | End: 2018-01-03
Attending: INTERNAL MEDICINE | Admitting: INTERNAL MEDICINE

## 2018-01-03 DIAGNOSIS — Z01.818 PRE-OP EXAM: ICD-10-CM

## 2018-01-03 LAB
ANION GAP SERPL CALCULATED.3IONS-SCNC: 12 MMOL/L (ref 4–16)
APTT: 27.9 SECONDS (ref 21.2–33)
BUN BLDV-MCNC: 10 MG/DL (ref 6–23)
CALCIUM SERPL-MCNC: 9.6 MG/DL (ref 8.3–10.6)
CHLORIDE BLD-SCNC: 99 MMOL/L (ref 99–110)
CO2: 31 MMOL/L (ref 21–32)
CREAT SERPL-MCNC: 1.1 MG/DL (ref 0.6–1.1)
GFR AFRICAN AMERICAN: >60 ML/MIN/1.73M2
GFR NON-AFRICAN AMERICAN: 52 ML/MIN/1.73M2
GLUCOSE BLD-MCNC: 90 MG/DL (ref 70–99)
HCT VFR BLD CALC: 36.1 % (ref 37–47)
HEMOGLOBIN: 10.7 GM/DL (ref 12.5–16)
INR BLD: 1.03 INDEX
MAGNESIUM: 2.1 MG/DL (ref 1.8–2.4)
MCH RBC QN AUTO: 26.7 PG (ref 27–31)
MCHC RBC AUTO-ENTMCNC: 29.6 % (ref 32–36)
MCV RBC AUTO: 90 FL (ref 78–100)
PDW BLD-RTO: 24.6 % (ref 11.7–14.9)
PHOSPHORUS: 3.7 MG/DL (ref 2.5–4.9)
PLATELET # BLD: 265 K/CU MM (ref 140–440)
PMV BLD AUTO: 10.7 FL (ref 7.5–11.1)
POTASSIUM SERPL-SCNC: 4.6 MMOL/L (ref 3.5–5.1)
PROTHROMBIN TIME: 12 SECONDS
RBC # BLD: 4.01 M/CU MM (ref 4.2–5.4)
SODIUM BLD-SCNC: 142 MMOL/L (ref 135–145)
WBC # BLD: 7.5 K/CU MM (ref 4–10.5)

## 2018-01-09 ENCOUNTER — TELEPHONE (OUTPATIENT)
Dept: CARDIOLOGY CLINIC | Age: 58
End: 2018-01-09

## 2018-01-12 ENCOUNTER — TELEPHONE (OUTPATIENT)
Dept: FAMILY MEDICINE CLINIC | Age: 58
End: 2018-01-12

## 2018-01-12 ENCOUNTER — OFFICE VISIT (OUTPATIENT)
Dept: FAMILY MEDICINE CLINIC | Age: 58
End: 2018-01-12

## 2018-01-12 VITALS
WEIGHT: 193.6 LBS | TEMPERATURE: 98.6 F | DIASTOLIC BLOOD PRESSURE: 78 MMHG | BODY MASS INDEX: 33.05 KG/M2 | SYSTOLIC BLOOD PRESSURE: 124 MMHG | HEIGHT: 64 IN

## 2018-01-12 DIAGNOSIS — L03.119 CELLULITIS OF LOWER EXTREMITY, UNSPECIFIED LATERALITY: Primary | ICD-10-CM

## 2018-01-12 PROCEDURE — G8484 FLU IMMUNIZE NO ADMIN: HCPCS | Performed by: FAMILY MEDICINE

## 2018-01-12 PROCEDURE — 99213 OFFICE O/P EST LOW 20 MIN: CPT | Performed by: FAMILY MEDICINE

## 2018-01-12 PROCEDURE — 3014F SCREEN MAMMO DOC REV: CPT | Performed by: FAMILY MEDICINE

## 2018-01-12 PROCEDURE — G8427 DOCREV CUR MEDS BY ELIG CLIN: HCPCS | Performed by: FAMILY MEDICINE

## 2018-01-12 PROCEDURE — G8417 CALC BMI ABV UP PARAM F/U: HCPCS | Performed by: FAMILY MEDICINE

## 2018-01-12 PROCEDURE — G8598 ASA/ANTIPLAT THER USED: HCPCS | Performed by: FAMILY MEDICINE

## 2018-01-12 PROCEDURE — 4004F PT TOBACCO SCREEN RCVD TLK: CPT | Performed by: FAMILY MEDICINE

## 2018-01-12 PROCEDURE — 3017F COLORECTAL CA SCREEN DOC REV: CPT | Performed by: FAMILY MEDICINE

## 2018-01-12 RX ORDER — CLINDAMYCIN HYDROCHLORIDE 300 MG/1
300 CAPSULE ORAL 3 TIMES DAILY
Qty: 30 CAPSULE | Refills: 0 | Status: SHIPPED | OUTPATIENT
Start: 2018-01-12 | End: 2018-01-22

## 2018-01-12 RX ORDER — TRAMADOL HYDROCHLORIDE 50 MG/1
50-100 TABLET ORAL EVERY 6 HOURS PRN
Qty: 40 TABLET | Refills: 1 | Status: SHIPPED | OUTPATIENT
Start: 2018-01-12 | End: 2018-01-22

## 2018-01-12 ASSESSMENT — PATIENT HEALTH QUESTIONNAIRE - PHQ9
1. LITTLE INTEREST OR PLEASURE IN DOING THINGS: 0
SUM OF ALL RESPONSES TO PHQ9 QUESTIONS 1 & 2: 0
SUM OF ALL RESPONSES TO PHQ QUESTIONS 1-9: 0
2. FEELING DOWN, DEPRESSED OR HOPELESS: 0

## 2018-01-15 ENCOUNTER — OFFICE VISIT (OUTPATIENT)
Dept: FAMILY MEDICINE CLINIC | Age: 58
End: 2018-01-15

## 2018-01-15 VITALS
SYSTOLIC BLOOD PRESSURE: 112 MMHG | BODY MASS INDEX: 34.08 KG/M2 | WEIGHT: 199.6 LBS | TEMPERATURE: 96.9 F | HEIGHT: 64 IN | DIASTOLIC BLOOD PRESSURE: 74 MMHG | OXYGEN SATURATION: 97 % | HEART RATE: 83 BPM

## 2018-01-15 DIAGNOSIS — R60.0 EDEMA OF LOWER EXTREMITY: ICD-10-CM

## 2018-01-15 DIAGNOSIS — L03.119 CELLULITIS OF LOWER EXTREMITY, UNSPECIFIED LATERALITY: Primary | ICD-10-CM

## 2018-01-15 PROCEDURE — 3014F SCREEN MAMMO DOC REV: CPT | Performed by: FAMILY MEDICINE

## 2018-01-15 PROCEDURE — 3017F COLORECTAL CA SCREEN DOC REV: CPT | Performed by: FAMILY MEDICINE

## 2018-01-15 PROCEDURE — G8417 CALC BMI ABV UP PARAM F/U: HCPCS | Performed by: FAMILY MEDICINE

## 2018-01-15 PROCEDURE — G8484 FLU IMMUNIZE NO ADMIN: HCPCS | Performed by: FAMILY MEDICINE

## 2018-01-15 PROCEDURE — G8598 ASA/ANTIPLAT THER USED: HCPCS | Performed by: FAMILY MEDICINE

## 2018-01-15 PROCEDURE — 99213 OFFICE O/P EST LOW 20 MIN: CPT | Performed by: FAMILY MEDICINE

## 2018-01-15 PROCEDURE — 4004F PT TOBACCO SCREEN RCVD TLK: CPT | Performed by: FAMILY MEDICINE

## 2018-01-15 PROCEDURE — G8427 DOCREV CUR MEDS BY ELIG CLIN: HCPCS | Performed by: FAMILY MEDICINE

## 2018-01-15 RX ORDER — NICOTINE 21 MG/24HR
1 PATCH, TRANSDERMAL 24 HOURS TRANSDERMAL EVERY 24 HOURS
Qty: 30 PATCH | Refills: 0 | Status: SHIPPED | OUTPATIENT
Start: 2018-01-15 | End: 2018-05-17 | Stop reason: SDUPTHER

## 2018-01-15 NOTE — PROGRESS NOTES
Reviewed intake note. SUBJECTIVE:  Zaynab Castro is a 56 y.o. female who presents with erythema and tenderness. Location: bilateral lower extremeties   Onset: acute   Duration: 5 days and symptoms are improving   Associated symptoms: edema   Recent treatment: antibiotics clindamycin  Functional status affected: yes  Erythema and warmth resolved    Allergies: Other  Patient Active Problem List   Diagnosis    Cerebral infarction ([facility]    [facility]    Depression    Insomnia    Aphagia    Hypothyroidism    Anemia    CH[facility]congestive heart failure) [facility]    Edema, lower extremity    Obesity    Abnormal nuclear stress test    Bilateral carpal tunnel syndrome    Chronic systolic congestive heart failure ([facility])    Hypokalemia    Urge incontinence of urine    Ischemic chest pain ([facility]    Unstable angina ([facility])    NSTEMI (non-ST elevated myocardial infarction) [facility]    Chronic obstructive pulmonary disease ([facility])    Non morbid obesity due to excess calories    LV (left ventricular) mural thrombus    CAD (coronary artery disease)    Dyslipidemia    Pseudobulbar affect    SVT (supraventricular tachycardia) ([facility])       OBJECTIVE:  APPEARANCE: Alert, oriented, no acute distress and obese   CARDIOVASCULAR: regular rate and rhythm, no murmurs   RESPIRATORY: clear to auscultation, no wheezes or rales and unlabored breathing   LESION SIZE/LOCATION: bilateral LE   LESION DESCRIPTION: swelling   ASSOCIATED SIGNS: edema   Erythema and warmth have resolved. A: cellulitis - improving on clindamycin. P: Continue clinical eyes until gone. Wear compression stockings daily, and keep legs elevated. Continue Lasix.

## 2018-01-16 ENCOUNTER — TELEPHONE (OUTPATIENT)
Dept: CARDIOLOGY CLINIC | Age: 58
End: 2018-01-16

## 2018-01-16 ENCOUNTER — OFFICE VISIT (OUTPATIENT)
Dept: CARDIOLOGY CLINIC | Age: 58
End: 2018-01-16

## 2018-01-16 VITALS
SYSTOLIC BLOOD PRESSURE: 120 MMHG | HEIGHT: 64 IN | BODY MASS INDEX: 34.35 KG/M2 | WEIGHT: 201.2 LBS | HEART RATE: 84 BPM | DIASTOLIC BLOOD PRESSURE: 70 MMHG

## 2018-01-16 DIAGNOSIS — E78.5 DYSLIPIDEMIA: ICD-10-CM

## 2018-01-16 DIAGNOSIS — E66.09 NON MORBID OBESITY DUE TO EXCESS CALORIES: ICD-10-CM

## 2018-01-16 DIAGNOSIS — E66.09 CLASS 1 OBESITY DUE TO EXCESS CALORIES WITH SERIOUS COMORBIDITY AND BODY MASS INDEX (BMI) OF 34.0 TO 34.9 IN ADULT: ICD-10-CM

## 2018-01-16 DIAGNOSIS — I25.110 CORONARY ARTERY DISEASE INVOLVING NATIVE CORONARY ARTERY OF NATIVE HEART WITH UNSTABLE ANGINA PECTORIS (HCC): Primary | ICD-10-CM

## 2018-01-16 DIAGNOSIS — I47.1 SVT (SUPRAVENTRICULAR TACHYCARDIA) (HCC): ICD-10-CM

## 2018-01-16 DIAGNOSIS — R60.0 EDEMA, LOWER EXTREMITY: ICD-10-CM

## 2018-01-16 DIAGNOSIS — J44.9 CHRONIC OBSTRUCTIVE PULMONARY DISEASE, UNSPECIFIED COPD TYPE (HCC): ICD-10-CM

## 2018-01-16 DIAGNOSIS — E03.9 ACQUIRED HYPOTHYROIDISM: ICD-10-CM

## 2018-01-16 DIAGNOSIS — I21.4 NSTEMI (NON-ST ELEVATED MYOCARDIAL INFARCTION) (HCC): ICD-10-CM

## 2018-01-16 DIAGNOSIS — I50.22 CHRONIC SYSTOLIC CONGESTIVE HEART FAILURE (HCC): ICD-10-CM

## 2018-01-16 PROCEDURE — G8926 SPIRO NO PERF OR DOC: HCPCS | Performed by: INTERNAL MEDICINE

## 2018-01-16 PROCEDURE — 3014F SCREEN MAMMO DOC REV: CPT | Performed by: INTERNAL MEDICINE

## 2018-01-16 PROCEDURE — 3023F SPIROM DOC REV: CPT | Performed by: INTERNAL MEDICINE

## 2018-01-16 PROCEDURE — G8598 ASA/ANTIPLAT THER USED: HCPCS | Performed by: INTERNAL MEDICINE

## 2018-01-16 PROCEDURE — 99214 OFFICE O/P EST MOD 30 MIN: CPT | Performed by: INTERNAL MEDICINE

## 2018-01-16 PROCEDURE — G8427 DOCREV CUR MEDS BY ELIG CLIN: HCPCS | Performed by: INTERNAL MEDICINE

## 2018-01-16 PROCEDURE — G8484 FLU IMMUNIZE NO ADMIN: HCPCS | Performed by: INTERNAL MEDICINE

## 2018-01-16 PROCEDURE — 4004F PT TOBACCO SCREEN RCVD TLK: CPT | Performed by: INTERNAL MEDICINE

## 2018-01-16 PROCEDURE — 3017F COLORECTAL CA SCREEN DOC REV: CPT | Performed by: INTERNAL MEDICINE

## 2018-01-16 PROCEDURE — G8417 CALC BMI ABV UP PARAM F/U: HCPCS | Performed by: INTERNAL MEDICINE

## 2018-01-16 RX ORDER — FUROSEMIDE 20 MG/1
20 TABLET ORAL DAILY
Qty: 30 TABLET | Refills: 11 | Status: SHIPPED | OUTPATIENT
Start: 2018-01-16 | End: 2018-01-16 | Stop reason: SDUPTHER

## 2018-01-16 RX ORDER — CARVEDILOL 6.25 MG/1
6.25 TABLET ORAL 2 TIMES DAILY WITH MEALS
Qty: 60 TABLET | Refills: 11 | Status: SHIPPED | OUTPATIENT
Start: 2018-01-16 | End: 2018-09-06 | Stop reason: SDUPTHER

## 2018-01-16 RX ORDER — DILTIAZEM HYDROCHLORIDE 120 MG/1
120 CAPSULE, COATED, EXTENDED RELEASE ORAL DAILY
Qty: 30 CAPSULE | Refills: 11 | Status: ON HOLD | OUTPATIENT
Start: 2018-01-16 | End: 2018-02-14 | Stop reason: HOSPADM

## 2018-01-16 RX ORDER — FUROSEMIDE 40 MG/1
40 TABLET ORAL DAILY
Qty: 30 TABLET | Refills: 5 | Status: SHIPPED | OUTPATIENT
Start: 2018-01-16 | End: 2018-02-21 | Stop reason: SDUPTHER

## 2018-01-16 NOTE — PROGRESS NOTES
Lonna Kawasaki is a 56 y.o. female who has    CHIEF COMPLAINT AS FOLLOWS:  CHEST PAIN: Patient denies any C/O chest pains at this time. SOB: No C/O SOB at this time. LEG EDEMA: 2 + B/L Lower extremity pitting edema noted. PALPITATIONS: Denies any C/O Palpitations                                  DIZZINESS: No C/O Dizziness                            SYNCOPE: None   OTHER:                                     HPI: Patient is here for F/U on her SVT, CAD, HTN & Dyslipidemia problems. Shee does not have any new complaints at this time.     Hollis Hair has the following history recorded in care path:  Patient Active Problem List    Diagnosis Date Noted    SVT (supraventricular tachycardia) ([facility]      Priority: High    Pseudobulbar affect 04/27/2017     Priority: Low    Dyslipidemia 04/26/2016     Priority: Low    CAD (coronary artery disease)      Priority: Low    LV (left ventricular) mural thrombus      Priority: Low    Unstable angina (HCC)      Priority: Low    NSTEMI (non-ST elevated myocardial infarction) ([facility])      Priority: Low    Chronic obstructive pulmonary disease (HCC)      Priority: Low    Non morbid obesity due to excess calories      Priority: Low    Ischemic chest pain (HCC) 04/16/2016     Priority: Low    Urge incontinence of urine 01/14/2016     Priority: Low    Bilateral carpal tunnel syndrome 10/29/2015     Priority: Low    Chronic systolic congestive heart failure [facility] 10/29/2015     Priority: Low    Hypokalemia 10/29/2015     Priority: Low    Abnormal nuclear stress test      Priority: Low    CHF (congestive heart failure) ([facility] 07/14/2015     Priority: Low    Edema, lower extremity 07/14/2015     Priority: Low    Obesity 07/14/2015     Priority: Low    Anemia 12/01/2014     Priority: Low    Hypothyroidism 07/01/2014     Priority: Low    COPD J44.9    Non morbid obesity due to excess calories E66.09    LV (left ventricular) mural thrombus [id number]    CAD (coronary artery disease) I25.10    Dyslipidemia E78.5    Pseudobulbar affect F48.2    SVT (supraventricular tachycardia) ([facility]) I47.1       Assessment & Plan:    CAD:Yes   clinically stable. Patient is on optimal medical regimen ( see medication list above )  -     CORONARY ARTERY DISEASE: Patient is currently  asymptomatic from CAD. - changes in  treatment:   no           - Testing ordered:  yes,   Holmes classification: 1  [place] RISK SCORE:  BETINA RISK SCORE:  HTN:well controlled on current medical regimen, see list above. - changes in  treatment:   no   CARDIOMYOPATHY:  known   CONGESTIVE HEART FAILURE: ? Zac Riddlee VHD: No significant VHD noted  DYSLIPIDEMIA: Patient's profile is at / near Goal.yes,                                 HDL is low                                Tolerating current medical regimen wellyes,                                                         See most recent Lab values in Labs section above. OTHER RELEVANT DIAGNOSIS:as noted in patient's active problem list:  TESTS ORDERED: BMP                                           All previously ordered tests reviewed. ARRHYTHMIAS: SVT   MEDICATIONS: Increase Lasix to 40 mg daily. Patient to use stockings   Office f/u in three months. Primary/secondary prevention is the goal by aggressive risk modification, healthy and therapeutic life style changes for cardiovascular risk reduction. Various goals are discussed and multiple questions answered.

## 2018-01-18 ENCOUNTER — TELEPHONE (OUTPATIENT)
Dept: CARDIOLOGY CLINIC | Age: 58
End: 2018-01-18

## 2018-01-19 ENCOUNTER — TELEPHONE (OUTPATIENT)
Dept: CARDIOLOGY CLINIC | Age: 58
End: 2018-01-19

## 2018-01-19 NOTE — LETTER
This form has been fully explained to me. I understand its contents. Patients Signature: ___________________________Date: ________  Time: ________    If patient unable to sign, has engaged the [address] [place] [address], is a minor, or has a court-appointed Guardian:  [facility] Representative Name (Print):  ____________________________________      Relationship (West one):    Guardian   Parent    Spouse    HCPOA   Child   Sibling  Next-of-Kin Friend    Patients Representative Signature: _______________________________________              Date: ______________  Time: __________    An  was used.  name/ID: _________________________________      [facility]) Witness________________________  Date: ________   Time: _________    Physician/Practitioner _______________________  Date: ________   Time: _________           Revision 2017        Roma Lopez     PATIENT NAME:    Jay Chavez                               :   1960    PROCEDURE:  Electrophysiology Study/Supraventricular Tachycardia Ablation       DATE OF PROCEDURE: 18    DIAGNOSIS:   Supraventricular Tachycardia         X MAG    X PHOS       X CBC       X  BMP    X    PT   X  PTT       X     Chest x-Ray PA & Lateral View      ? PLEASE CALL ABNORMAL RESULTS TO THE REQUESTING PHYSICIAN? ATTENTION PATIENTS:  You do not have to fast for the lab work. You must go to the [facility] at [address] [place] [address] to have the lab work done.      Phone: [phone]   Hours: 7:00 am to 5:00 pm    7:00 am to 12:00 pm           Physician Signature:___________________________________Date:_____________                                  Roma Asp     Dr. Mary Up:    PROCEDURE: Electrophysiology Study/Supraventricular Tachycardia Ablation Patient Name: Robin Addison   : 1960  MRN# [id number]    Home Phone Number: [phone]   Weight:    Wt Readings from Last 3 Encounters:   18 201 lb 3.2 oz (91.3 kg)   01/15/18 199 lb 9.6 oz (90.5 kg)   18 193 lb 9.6 oz (87.8 kg)        Insurance: Payor: Mk Savage / Plan: Mk Savage / Product Type: *No Product type* /     Date of Procedure: 18 Time: 2:30 Arrival Time: 12:30    Diagnosis:  Supraventricular Tachycardia    Allergies:    Allergies   Allergen Reactions    Other Other (See Comments)     Vicryl sutures, Pt does not heal with Vicryl sutures          1) Call [facility] scheduling ([phone]) or [address] [facility]     PHONE OR   INSTANT MESSAGE  2) PREAUTHORIZATION NUMBER:    Spoke to:      From date:     expiration date:        Betty Richardson

## 2018-01-22 ENCOUNTER — TELEPHONE (OUTPATIENT)
Dept: CARDIOLOGY CLINIC | Age: 58
End: 2018-01-22

## 2018-01-22 ENCOUNTER — HOSPITAL ENCOUNTER (OUTPATIENT)
Dept: LAB | Age: 58
Discharge: HOME | End: 2018-01-22
Attending: INTERNAL MEDICINE | Admitting: INTERNAL MEDICINE

## 2018-01-22 LAB
ANION GAP SERPL CALCULATED.3IONS-SCNC: 14 MMOL/L (ref 4–16)
BUN BLDV-MCNC: 10 MG/DL (ref 6–23)
CALCIUM SERPL-MCNC: 10.2 MG/DL (ref 8.3–10.6)
CHLORIDE BLD-SCNC: 98 MMOL/L (ref 99–110)
CO2: 32 MMOL/L (ref 21–32)
CREAT SERPL-MCNC: 1.5 MG/DL (ref 0.6–1.1)
GFR AFRICAN AMERICAN: 44 ML/MIN/1.73M2
GFR NON-AFRICAN AMERICAN: 36 ML/MIN/1.73M2
GLUCOSE BLD-MCNC: 92 MG/DL (ref 70–99)
POTASSIUM SERPL-SCNC: 4.5 MMOL/L (ref 3.5–5.1)
SODIUM BLD-SCNC: 144 MMOL/L (ref 135–145)

## 2018-02-06 ENCOUNTER — TELEPHONE (OUTPATIENT)
Dept: CARDIOLOGY CLINIC | Age: 58
End: 2018-02-06

## 2018-02-09 ENCOUNTER — HOSPITAL ENCOUNTER (OUTPATIENT)
Dept: LAB | Age: 58
Discharge: HOME | End: 2018-02-09
Attending: INTERNAL MEDICINE | Admitting: INTERNAL MEDICINE

## 2018-02-09 LAB
ANION GAP SERPL CALCULATED.3IONS-SCNC: 11 MMOL/L (ref 4–16)
APTT: 30.8 SECONDS (ref 21.2–33)
BUN BLDV-MCNC: 8 MG/DL (ref 6–23)
CALCIUM SERPL-MCNC: 9.4 MG/DL (ref 8.3–10.6)
CHLORIDE BLD-SCNC: 101 MMOL/L (ref 99–110)
CO2: 31 MMOL/L (ref 21–32)
CREAT SERPL-MCNC: 1.1 MG/DL (ref 0.6–1.1)
GFR AFRICAN AMERICAN: >60 ML/MIN/1.73M2
GFR NON-AFRICAN AMERICAN: 52 ML/MIN/1.73M2
GLUCOSE BLD-MCNC: 108 MG/DL (ref 70–99)
HCT VFR BLD CALC: 37.6 % (ref 37–47)
HEMOGLOBIN: 12.1 GM/DL (ref 12.5–16)
INR BLD: 0.98 INDEX
MAGNESIUM: 2 MG/DL (ref 1.8–2.4)
MCH RBC QN AUTO: 29.2 PG (ref 27–31)
MCHC RBC AUTO-ENTMCNC: 32.2 % (ref 32–36)
MCV RBC AUTO: 90.6 FL (ref 78–100)
PDW BLD-RTO: 20.9 % (ref 11.7–14.9)
PHOSPHORUS: 2.3 MG/DL (ref 2.5–4.9)
PLATELET # BLD: 246 K/CU MM (ref 140–440)
PMV BLD AUTO: 10.5 FL (ref 7.5–11.1)
POTASSIUM SERPL-SCNC: 5 MMOL/L (ref 3.5–5.1)
PROTHROMBIN TIME: 11.4 SECONDS
RBC # BLD: 4.15 M/CU MM (ref 4.2–5.4)
SODIUM BLD-SCNC: 143 MMOL/L (ref 135–145)
WBC # BLD: 6.3 K/CU MM (ref 4–10.5)

## 2018-02-12 PROBLEM — Z98.890 S/P ABLATION OF ACCESSORY BYPASS TRACT: Status: ACTIVE | Noted: 2018-02-12

## 2018-02-14 ENCOUNTER — TELEPHONE (OUTPATIENT)
Dept: CARDIOLOGY CLINIC | Age: 58
End: 2018-02-14

## 2018-02-20 ENCOUNTER — HOSPITAL ENCOUNTER (OUTPATIENT)
Dept: GENERAL RADIOLOGY | Age: 58
Discharge: HOME | End: 2018-02-20
Attending: INTERNAL MEDICINE | Admitting: INTERNAL MEDICINE

## 2018-02-20 ENCOUNTER — TELEPHONE (OUTPATIENT)
Dept: CARDIOLOGY CLINIC | Age: 58
End: 2018-02-20

## 2018-02-20 LAB
BASOPHILS ABSOLUTE: 0 K/CU MM
BASOPHILS RELATIVE PERCENT: 0.6 % (ref 0–1)
DIFFERENTIAL TYPE: ABNORMAL
EOSINOPHILS ABSOLUTE: 0.4 K/CU MM
EOSINOPHILS RELATIVE PERCENT: 5.6 % (ref 0–3)
HCT VFR BLD CALC: 27.5 % (ref 37–47)
HEMOGLOBIN: 8.6 GM/DL (ref 12.5–16)
IMMATURE NEUTROPHIL %: 0.3 % (ref 0–0.43)
LYMPHOCYTES ABSOLUTE: 1.5 K/CU MM
LYMPHOCYTES RELATIVE PERCENT: 23.3 % (ref 24–44)
MCH RBC QN AUTO: 29.7 PG (ref 27–31)
MCHC RBC AUTO-ENTMCNC: 31.3 % (ref 32–36)
MCV RBC AUTO: 94.8 FL (ref 78–100)
MONOCYTES ABSOLUTE: 0.6 K/CU MM
MONOCYTES RELATIVE PERCENT: 9 % (ref 0–4)
NUCLEATED RBC %: 0.6 %
PDW BLD-RTO: 21.7 % (ref 11.7–14.9)
PLATELET # BLD: 260 K/CU MM (ref 140–440)
PMV BLD AUTO: 10.8 FL (ref 7.5–11.1)
RBC # BLD: 2.9 M/CU MM (ref 4.2–5.4)
SEGMENTED NEUTROPHILS ABSOLUTE COUNT: 3.9 K/CU MM
SEGMENTED NEUTROPHILS RELATIVE PERCENT: 61.2 % (ref 36–66)
TOTAL IMMATURE NEUTOROPHIL: 0.02 K/CU MM
TOTAL NUCLEATED RBC: 0 K/CU MM
WBC # BLD: 6.4 K/CU MM (ref 4–10.5)

## 2018-02-21 ENCOUNTER — OFFICE VISIT (OUTPATIENT)
Dept: CARDIOLOGY CLINIC | Age: 58
End: 2018-02-21

## 2018-02-21 VITALS
BODY MASS INDEX: 33.46 KG/M2 | WEIGHT: 196 LBS | HEART RATE: 93 BPM | DIASTOLIC BLOOD PRESSURE: 74 MMHG | SYSTOLIC BLOOD PRESSURE: 122 MMHG | OXYGEN SATURATION: 95 % | HEIGHT: 64 IN

## 2018-02-21 DIAGNOSIS — I47.1 SVT (SUPRAVENTRICULAR TACHYCARDIA) (HCC): ICD-10-CM

## 2018-02-21 DIAGNOSIS — E87.6 HYPOKALEMIA: ICD-10-CM

## 2018-02-21 DIAGNOSIS — Z98.890 S/P ABLATION OF ACCESSORY BYPASS TRACT: Primary | ICD-10-CM

## 2018-02-21 PROCEDURE — G8427 DOCREV CUR MEDS BY ELIG CLIN: HCPCS | Performed by: INTERNAL MEDICINE

## 2018-02-21 PROCEDURE — G8598 ASA/ANTIPLAT THER USED: HCPCS | Performed by: INTERNAL MEDICINE

## 2018-02-21 PROCEDURE — 3017F COLORECTAL CA SCREEN DOC REV: CPT | Performed by: INTERNAL MEDICINE

## 2018-02-21 PROCEDURE — 3014F SCREEN MAMMO DOC REV: CPT | Performed by: INTERNAL MEDICINE

## 2018-02-21 PROCEDURE — 93000 ELECTROCARDIOGRAM COMPLETE: CPT | Performed by: INTERNAL MEDICINE

## 2018-02-21 PROCEDURE — 4004F PT TOBACCO SCREEN RCVD TLK: CPT | Performed by: INTERNAL MEDICINE

## 2018-02-21 PROCEDURE — G8417 CALC BMI ABV UP PARAM F/U: HCPCS | Performed by: INTERNAL MEDICINE

## 2018-02-21 PROCEDURE — 99213 OFFICE O/P EST LOW 20 MIN: CPT | Performed by: INTERNAL MEDICINE

## 2018-02-21 PROCEDURE — G8482 FLU IMMUNIZE ORDER/ADMIN: HCPCS | Performed by: INTERNAL MEDICINE

## 2018-02-21 RX ORDER — POTASSIUM CHLORIDE 20 MEQ/1
20 TABLET, EXTENDED RELEASE ORAL DAILY
Qty: 30 TABLET | Refills: 5 | Status: SHIPPED | OUTPATIENT
Start: 2018-02-21 | End: 2018-08-15 | Stop reason: SDUPTHER

## 2018-02-21 RX ORDER — FUROSEMIDE 40 MG/1
40 TABLET ORAL 2 TIMES DAILY
Qty: 30 TABLET | Refills: 1 | Status: SHIPPED | OUTPATIENT
Start: 2018-02-21 | End: 2018-08-15 | Stop reason: SDUPTHER

## 2018-02-21 NOTE — PROGRESS NOTES
the LAD. Successful angio-seal deployment w/ excellent results.  Hx of cardiovascular stress test 6/22/2015    lexiscan-scar,EF47%    Hx of echocardiogram 4/8/2014    VALDEMAR: EF 40-45%. Right and left atrium/ventricles are normal. Mitral/Tricuspid valves normal. Mild aortic va;lve stenosis.  Hx of echocardiogram 4/18/2016    EF 35-40%. Borderline LA enlargement. 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 Mild MR/TR with trace aortic insufficiency. Mild pulmonary HTN.  Hyperlipemia     Hypertension, essential, benign     Nicotine dependence     quit 2014    NSTEMI (non-ST elevated myocardial infarction) ([facility] 4/17/2015    Post PTCA     Thyroid disease     Unstable angina (HCC)     Venous doppler 10/23/2017    Normal venous duplex examination of the legs bilaterally with normal venous Doppler signals noted throughout. No evidence of thrombophlebitis is noted bilaterally in the deep and superficial veins of the legs. Surgical history :   Past Surgical History:   Procedure Laterality Date    BREAST SURGERY Bilateral 1995    fibrocystic breast mass    CARDIAC SURGERY  2016    catherization, stent x1 placed    CARPAL TUNNEL RELEASE Bilateral     2015    COLONOSCOPY  12/13/2017    normal 10 year       Family history:   Family History   Problem Relation Age of Onset    Stroke Mother     Stroke Father        Social history :  reports that she has been smoking Cigarettes. She started smoking about 3 years ago. She has been smoking about 0.50 packs per day. She has never used smokeless tobacco. She reports that she does not drink alcohol or use drugs.     Allergies   Allergen Reactions    Other Other (See Comments)     Vicryl sutures, Pt does not heal with Vicryl sutures         Current Outpatient Prescriptions on File Prior to Visit   Medication Sig Dispense Refill    apixaban (ELIQUIS) 5 MG TABS tablet Take 1 tablet by mouth 2 times daily 60

## 2018-03-05 ENCOUNTER — OFFICE VISIT (OUTPATIENT)
Dept: CARDIOLOGY CLINIC | Age: 58
End: 2018-03-05

## 2018-03-05 VITALS
HEART RATE: 88 BPM | SYSTOLIC BLOOD PRESSURE: 120 MMHG | BODY MASS INDEX: 32.68 KG/M2 | HEIGHT: 64 IN | DIASTOLIC BLOOD PRESSURE: 80 MMHG | WEIGHT: 191.4 LBS

## 2018-03-05 DIAGNOSIS — I50.22 CHRONIC SYSTOLIC CONGESTIVE HEART FAILURE (HCC): ICD-10-CM

## 2018-03-05 DIAGNOSIS — S30.1XXD HEMATOMA OF GROIN, SUBSEQUENT ENCOUNTER: ICD-10-CM

## 2018-03-05 DIAGNOSIS — Z98.890 S/P ABLATION OF ACCESSORY BYPASS TRACT: ICD-10-CM

## 2018-03-05 DIAGNOSIS — I21.4 NSTEMI (NON-ST ELEVATED MYOCARDIAL INFARCTION) (HCC): ICD-10-CM

## 2018-03-05 DIAGNOSIS — I47.1 SVT (SUPRAVENTRICULAR TACHYCARDIA) (HCC): ICD-10-CM

## 2018-03-05 DIAGNOSIS — E78.5 DYSLIPIDEMIA: ICD-10-CM

## 2018-03-05 DIAGNOSIS — R60.0 EDEMA, LOWER EXTREMITY: ICD-10-CM

## 2018-03-05 DIAGNOSIS — I25.110 CORONARY ARTERY DISEASE INVOLVING NATIVE CORONARY ARTERY OF NATIVE HEART WITH UNSTABLE ANGINA PECTORIS (HCC): Primary | ICD-10-CM

## 2018-03-05 DIAGNOSIS — E66.09 CLASS 1 OBESITY DUE TO EXCESS CALORIES WITH SERIOUS COMORBIDITY AND BODY MASS INDEX (BMI) OF 34.0 TO 34.9 IN ADULT: ICD-10-CM

## 2018-03-05 DIAGNOSIS — E03.9 ACQUIRED HYPOTHYROIDISM: ICD-10-CM

## 2018-03-05 DIAGNOSIS — J44.9 CHRONIC OBSTRUCTIVE PULMONARY DISEASE, UNSPECIFIED COPD TYPE (HCC): ICD-10-CM

## 2018-03-05 PROCEDURE — G8926 SPIRO NO PERF OR DOC: HCPCS | Performed by: INTERNAL MEDICINE

## 2018-03-05 PROCEDURE — 99214 OFFICE O/P EST MOD 30 MIN: CPT | Performed by: INTERNAL MEDICINE

## 2018-03-05 PROCEDURE — 4004F PT TOBACCO SCREEN RCVD TLK: CPT | Performed by: INTERNAL MEDICINE

## 2018-03-05 PROCEDURE — G8427 DOCREV CUR MEDS BY ELIG CLIN: HCPCS | Performed by: INTERNAL MEDICINE

## 2018-03-05 PROCEDURE — 3023F SPIROM DOC REV: CPT | Performed by: INTERNAL MEDICINE

## 2018-03-05 PROCEDURE — G8484 FLU IMMUNIZE NO ADMIN: HCPCS | Performed by: INTERNAL MEDICINE

## 2018-03-05 PROCEDURE — G8417 CALC BMI ABV UP PARAM F/U: HCPCS | Performed by: INTERNAL MEDICINE

## 2018-03-05 PROCEDURE — 3017F COLORECTAL CA SCREEN DOC REV: CPT | Performed by: INTERNAL MEDICINE

## 2018-03-05 PROCEDURE — G8598 ASA/ANTIPLAT THER USED: HCPCS | Performed by: INTERNAL MEDICINE

## 2018-03-05 PROCEDURE — 3014F SCREEN MAMMO DOC REV: CPT | Performed by: INTERNAL MEDICINE

## 2018-03-05 NOTE — PROGRESS NOTES
Venous doppler 10/23/2017    Normal venous duplex examination of the legs bilaterally with normal venous Doppler signals noted throughout. No evidence of thrombophlebitis is noted bilaterally in the deep and superficial veins of the legs. Past Surgical History:   Procedure Laterality Date    BREAST SURGERY Bilateral 1995    fibrocystic breast mass    CARDIAC SURGERY  2016    catherization, stent x1 placed    CARPAL TUNNEL RELEASE Bilateral     2015    COLONOSCOPY  12/13/2017    normal 10 year      As reviewed   Family History   Problem Relation Age of Onset    Stroke Mother     Stroke Father      Social History   Substance Use Topics    Smoking status: Current Every Day Smoker     Packs/day: 0.50     Types: Cigarettes     Start date: 4/6/2014    Smokeless tobacco: Never Used    Alcohol use No      Comment: hx of alcoholism      Review of Systems:    Constitutional: Negative for diaphoresis and fatigue  Psychological:Negative for anxiety or depression  HENT: Negative for headaches, nasal congestion, sinus pain or vertigo  Eyes: Negative for visual disturbance.    Endocrine: Negative for polydipsia/polyuria  Respiratory: Negative for shortness of breath  Cardiovascular: Negative for chest pain, dyspnea on exertion, claudication, edema, irregular heartbeat, murmur, palpitations or shortness of breath  Gastrointestinal: Negative for abdominal pain or heartburn  Genito-Urinary: Negative for urinary frequency/urgency  Musculoskeletal: Negative for muscle pain, muscular weakness, negative for pain in arm and leg or swelling in foot and leg  Neurological: Negative for dizziness, headaches, memory loss, numbness/tingling, visual changes, syncope  Dermatological: Negative for rash    Objective:  /80 (Position: Standing)   Pulse 88   Ht 5' 4\" (1.626 m)   Wt 191 lb 6.4 oz (86.8 kg)   BMI 32.85 kg/m²   Wt Readings from Last 3 Encounters:   03/05/18 191 lb 6.4 oz (86.8 kg)   02/21/18 196 lb (88.9 kg) REVIEWED:  1.CAD:Patient is taking anti platelet agent:Yes  2. DYSLIPIDEMIA: Patient is on cholesterol lowering medication:Yes  3. Beta-Blocker therapy for CAD, if prior Myocardial Infarction:Yes  4. Counselled regarding smoking cessation. 5. Atrial fibrillation & anticoagulation therapy Yes  6. Discussed weight management strategies. Impression:    1. Coronary artery disease involving native coronary artery of native heart with unstable angina pectoris ([facility]    2. SVT (supraventricular tachycardia) ([facility]    3. S/P ablation of accessory bypass tract    4. Hematoma of groin, subsequent encounter    5. Chronic obstructive pulmonary disease, unspecified COPD type ([facility]    6. Acquired hypothyroidism    7. Edema, lower extremity    8. Class 1 obesity due to excess calories with serious comorbidity and body mass index (BMI) of 34.0 to 34.9 in adult    9. Chronic systolic congestive heart failure ([facility]    10. NSTEMI (non-ST elevated myocardial infarction) ([facility]    11.  Dyslipidemia       Patient Active Problem List   Diagnosis Code    Cerebral infarction ([facility] I63.9    COPD (chronic obstructive pulmonary disease) ([facility] J44.9    Depression F32.9    Insomnia G47.00    Aphagia R13.0    Hypothyroidism E03.9    Anemia D64.9    CHF (congestive heart failure) ([facility]) I50.9    Edema, lower extremity R60.0    Obesity E66.9    Abnormal nuclear stress test R94.39    Bilateral carpal tunnel syndrome G56.03    Chronic systolic congestive heart failure ([facility]) I50.22    Hypokalemia E87.6    Urge incontinence of urine N39.41    Ischemic chest pain ([facility]) I20.9    Unstable angina ([facility]) I20.0    NSTEMI (non-ST elevated myocardial infarction) ([facility]) I21.4    Chronic obstructive pulmonary disease ([facility]) J44.9    Non morbid obesity due to excess calories E66.09    LV (left ventricular) mural thrombus [id number]    CAD (coronary artery disease) I25.10    Dyslipidemia E78.5    Pseudobulbar affect F48.2    SVT (supraventricular

## 2018-03-12 ENCOUNTER — TELEPHONE (OUTPATIENT)
Dept: FAMILY MEDICINE CLINIC | Age: 58
End: 2018-03-12

## 2018-03-13 RX ORDER — NICOTINE 21 MG/24HR
1 PATCH, TRANSDERMAL 24 HOURS TRANSDERMAL EVERY 24 HOURS
Qty: 30 PATCH | Refills: 0 | Status: SHIPPED | OUTPATIENT
Start: 2018-03-13 | End: 2018-05-17 | Stop reason: DRUGHIGH

## 2018-03-15 DIAGNOSIS — J30.89 PERENNIAL ALLERGIC RHINITIS: ICD-10-CM

## 2018-03-15 RX ORDER — FLUTICASONE PROPIONATE 50 MCG
SPRAY, SUSPENSION (ML) NASAL
Qty: 16 G | Refills: 3 | Status: SHIPPED | OUTPATIENT
Start: 2018-03-15 | End: 2018-05-17 | Stop reason: SDUPTHER

## 2018-03-16 ENCOUNTER — TELEPHONE (OUTPATIENT)
Dept: FAMILY MEDICINE CLINIC | Age: 58
End: 2018-03-16

## 2018-03-16 DIAGNOSIS — J44.9 CHRONIC OBSTRUCTIVE PULMONARY DISEASE, UNSPECIFIED COPD TYPE (HCC): Primary | ICD-10-CM

## 2018-05-08 ENCOUNTER — CARE COORDINATION (OUTPATIENT)
Dept: INTERNAL MEDICINE CLINIC | Age: 58
End: 2018-05-08

## 2018-05-17 ENCOUNTER — OFFICE VISIT (OUTPATIENT)
Dept: FAMILY MEDICINE CLINIC | Age: 58
End: 2018-05-17

## 2018-05-17 ENCOUNTER — CARE COORDINATION (OUTPATIENT)
Dept: FAMILY MEDICINE CLINIC | Age: 58
End: 2018-05-17

## 2018-05-17 VITALS
WEIGHT: 180 LBS | DIASTOLIC BLOOD PRESSURE: 82 MMHG | HEART RATE: 76 BPM | OXYGEN SATURATION: 98 % | BODY MASS INDEX: 30.9 KG/M2 | TEMPERATURE: 97.3 F | SYSTOLIC BLOOD PRESSURE: 116 MMHG

## 2018-05-17 DIAGNOSIS — E66.09 NON MORBID OBESITY DUE TO EXCESS CALORIES: ICD-10-CM

## 2018-05-17 DIAGNOSIS — I21.4 NSTEMI (NON-ST ELEVATED MYOCARDIAL INFARCTION) (HCC): ICD-10-CM

## 2018-05-17 DIAGNOSIS — I25.110 CORONARY ARTERY DISEASE INVOLVING NATIVE CORONARY ARTERY OF NATIVE HEART WITH UNSTABLE ANGINA PECTORIS (HCC): ICD-10-CM

## 2018-05-17 DIAGNOSIS — F41.9 ANXIETY: ICD-10-CM

## 2018-05-17 DIAGNOSIS — E03.4 HYPOTHYROIDISM DUE TO ACQUIRED ATROPHY OF THYROID: Primary | ICD-10-CM

## 2018-05-17 DIAGNOSIS — J44.9 CHRONIC OBSTRUCTIVE PULMONARY DISEASE, UNSPECIFIED COPD TYPE (HCC): ICD-10-CM

## 2018-05-17 DIAGNOSIS — Z78.9 TOBACCO NON-USER: ICD-10-CM

## 2018-05-17 DIAGNOSIS — J30.89 PERENNIAL ALLERGIC RHINITIS: ICD-10-CM

## 2018-05-17 DIAGNOSIS — R35.0 URINARY FREQUENCY: ICD-10-CM

## 2018-05-17 DIAGNOSIS — F32.1 MODERATE SINGLE CURRENT EPISODE OF MAJOR DEPRESSIVE DISORDER (HCC): ICD-10-CM

## 2018-05-17 DIAGNOSIS — E78.5 DYSLIPIDEMIA: ICD-10-CM

## 2018-05-17 PROCEDURE — 36415 COLL VENOUS BLD VENIPUNCTURE: CPT | Performed by: FAMILY MEDICINE

## 2018-05-17 PROCEDURE — 99214 OFFICE O/P EST MOD 30 MIN: CPT | Performed by: FAMILY MEDICINE

## 2018-05-17 PROCEDURE — 3023F SPIROM DOC REV: CPT | Performed by: FAMILY MEDICINE

## 2018-05-17 PROCEDURE — G8417 CALC BMI ABV UP PARAM F/U: HCPCS | Performed by: FAMILY MEDICINE

## 2018-05-17 PROCEDURE — G8926 SPIRO NO PERF OR DOC: HCPCS | Performed by: FAMILY MEDICINE

## 2018-05-17 PROCEDURE — G8427 DOCREV CUR MEDS BY ELIG CLIN: HCPCS | Performed by: FAMILY MEDICINE

## 2018-05-17 PROCEDURE — G8598 ASA/ANTIPLAT THER USED: HCPCS | Performed by: FAMILY MEDICINE

## 2018-05-17 PROCEDURE — 3017F COLORECTAL CA SCREEN DOC REV: CPT | Performed by: FAMILY MEDICINE

## 2018-05-17 PROCEDURE — 4004F PT TOBACCO SCREEN RCVD TLK: CPT | Performed by: FAMILY MEDICINE

## 2018-05-17 RX ORDER — LORAZEPAM 0.5 MG/1
TABLET ORAL EVERY 6 HOURS PRN
Refills: 0 | COMMUNITY
Start: 2018-04-26

## 2018-05-17 RX ORDER — TRAMADOL HYDROCHLORIDE 50 MG/1
TABLET ORAL
Refills: 0 | COMMUNITY
Start: 2018-04-26 | End: 2019-04-02 | Stop reason: ALTCHOICE

## 2018-05-17 RX ORDER — LEVOTHYROXINE SODIUM 0.1 MG/1
TABLET ORAL
Qty: 30 TABLET | Refills: 0 | Status: SHIPPED | OUTPATIENT
Start: 2018-05-17 | End: 2018-08-20 | Stop reason: SDUPTHER

## 2018-05-17 RX ORDER — NICOTINE 21 MG/24HR
1 PATCH, TRANSDERMAL 24 HOURS TRANSDERMAL EVERY 24 HOURS
Qty: 30 PATCH | Refills: 0 | Status: SHIPPED | OUTPATIENT
Start: 2018-05-17 | End: 2018-07-18 | Stop reason: SDUPTHER

## 2018-05-17 RX ORDER — VENLAFAXINE HYDROCHLORIDE 75 MG/1
CAPSULE, EXTENDED RELEASE ORAL
Refills: 0 | COMMUNITY
Start: 2018-04-26 | End: 2019-10-02

## 2018-05-17 RX ORDER — ATORVASTATIN CALCIUM 40 MG/1
TABLET, FILM COATED ORAL
Qty: 30 TABLET | Refills: 5 | Status: SHIPPED | OUTPATIENT
Start: 2018-05-17 | End: 2018-10-15 | Stop reason: SDUPTHER

## 2018-05-17 RX ORDER — FLUTICASONE PROPIONATE 50 MCG
SPRAY, SUSPENSION (ML) NASAL
Qty: 16 G | Refills: 5 | Status: SHIPPED | OUTPATIENT
Start: 2018-05-17 | End: 2019-01-31 | Stop reason: SDUPTHER

## 2018-05-17 RX ORDER — OXYBUTYNIN CHLORIDE 5 MG/1
5 TABLET ORAL 3 TIMES DAILY
Qty: 90 TABLET | Refills: 0 | Status: SHIPPED | OUTPATIENT
Start: 2018-05-17 | End: 2018-07-12 | Stop reason: SDUPTHER

## 2018-05-17 RX ORDER — MIRTAZAPINE 15 MG/1
TABLET, FILM COATED ORAL
Refills: 0 | COMMUNITY
Start: 2018-05-10 | End: 2019-04-02 | Stop reason: ALTCHOICE

## 2018-05-18 LAB
A/G RATIO: 1.4 (CALC) (ref 0.8–2.6)
ALBUMIN SERPL-MCNC: 4.4 GM/DL (ref 3.5–5.2)
ALP BLD-CCNC: 92 U/L (ref 23–144)
ALT SERPL-CCNC: 10 U/L (ref 0–60)
AST SERPL-CCNC: 16 U/L (ref 0–46)
BASOPHILS ABSOLUTE: 0 K/MM3 (ref 0–0.3)
BASOPHILS RELATIVE PERCENT: 0.6 % (ref 0–2)
BILIRUB SERPL-MCNC: 0.3 MG/DL (ref 0–1.2)
BUN / CREAT RATIO: 10 (CALC) (ref 7–25)
BUN BLDV-MCNC: 10 MG/DL (ref 3–29)
CALCIUM SERPL-MCNC: 10.3 MG/DL (ref 8.5–10.5)
CHLORIDE BLD-SCNC: 97 MEQ/L (ref 96–110)
CHOLESTEROL, TOTAL: 133 MG/DL
CO2: 29 MEQ/L (ref 19–32)
COPY(IES) SENT TO:: NORMAL
CREAT SERPL-MCNC: 1 MG/DL
EOSINOPHILS ABSOLUTE: 0.3 K/MM3 (ref 0–0.6)
EOSINOPHILS RELATIVE PERCENT: 4.1 % (ref 0–7)
GFR SERPL CREATININE-BSD FRML MDRD: 64 ML/MIN/1.73M2
GLOBULIN: 3.2 GM/DL (CALC) (ref 1.9–3.6)
GLUCOSE BLD-MCNC: 72 MG/DL
HCT VFR BLD CALC: 39.8 % (ref 35–46)
HDLC SERPL-MCNC: 57 MG/DL
HEMOGLOBIN: 13.6 G/DL (ref 12–15.6)
LDL CHOLESTEROL: 57 MG/DL (CALC)
LEUKOCYTES, UA: 6.5 K/MM3 (ref 3.8–10.8)
LYMPHOCYTES ABSOLUTE: 1.7 K/MM3 (ref 0.9–4.1)
LYMPHOCYTES RELATIVE PERCENT: 25.7 % (ref 18–47)
MCH RBC QN AUTO: 31.1 PG (ref 27–33)
MCHC RBC AUTO-ENTMCNC: 34.2 G/DL (ref 32–36)
MCV RBC AUTO: 90.9 FL (ref 80–100)
MONOCYTES ABSOLUTE: 0.4 K/MM3 (ref 0.2–1.1)
MONOCYTES RELATIVE PERCENT: 5.5 % (ref 0–14)
NEUTROPHILS ABSOLUTE: 4.2 K/MM3 (ref 1.5–7.8)
PDW BLD-RTO: 15.8 % (ref 9–15)
PLATELET # BLD: 257 K/MM3 (ref 130–400)
POTASSIUM SERPL-SCNC: 4.1 MEQ/L (ref 3.4–5.3)
RBC # BLD: 4.38 M/MM3 (ref 3.9–5.2)
SEGMENTED NEUTROPHILS RELATIVE PERCENT: 64.1 % (ref 40–75)
SODIUM BLD-SCNC: 141 MEQ/L (ref 135–148)
TOTAL PROTEIN: 7.6 GM/DL (ref 6–8.3)
TRIGL SERPL-MCNC: 93 MG/DL
TSH SERPL DL<=0.05 MIU/L-ACNC: 1.32 MICRO IU/ML (ref 0.4–4)
VLDLC SERPL CALC-MCNC: 19 MG/DL (CALC) (ref 4–38)

## 2018-06-11 DIAGNOSIS — R06.02 SHORTNESS OF BREATH: ICD-10-CM

## 2018-06-13 RX ORDER — MIDODRINE HYDROCHLORIDE 5 MG/1
5 TABLET ORAL 3 TIMES DAILY
Qty: 180 TABLET | Refills: 6 | Status: SHIPPED | OUTPATIENT
Start: 2018-06-13 | End: 2018-09-06 | Stop reason: SDUPTHER

## 2018-07-12 DIAGNOSIS — R35.0 URINARY FREQUENCY: ICD-10-CM

## 2018-07-12 RX ORDER — OXYBUTYNIN CHLORIDE 5 MG/1
TABLET ORAL
Qty: 90 TABLET | Refills: 0 | Status: SHIPPED | OUTPATIENT
Start: 2018-07-12 | End: 2018-08-13 | Stop reason: SDUPTHER

## 2018-07-17 ENCOUNTER — TELEPHONE (OUTPATIENT)
Dept: FAMILY MEDICINE CLINIC | Age: 58
End: 2018-07-17

## 2018-07-17 DIAGNOSIS — Z72.0 TOBACCO USE: Primary | ICD-10-CM

## 2018-07-18 RX ORDER — NICOTINE 21 MG/24HR
1 PATCH, TRANSDERMAL 24 HOURS TRANSDERMAL EVERY 24 HOURS
Qty: 30 PATCH | Refills: 0 | Status: SHIPPED | OUTPATIENT
Start: 2018-07-18 | End: 2019-10-30

## 2018-08-06 ENCOUNTER — TELEPHONE (OUTPATIENT)
Dept: FAMILY MEDICINE CLINIC | Age: 58
End: 2018-08-06

## 2018-08-06 DIAGNOSIS — R35.0 URINARY FREQUENCY: ICD-10-CM

## 2018-08-06 RX ORDER — OXYBUTYNIN CHLORIDE 10 MG/1
10 TABLET, EXTENDED RELEASE ORAL DAILY
Qty: 90 TABLET | Refills: 0 | Status: SHIPPED | OUTPATIENT
Start: 2018-08-06 | End: 2018-08-13 | Stop reason: ALTCHOICE

## 2018-08-06 NOTE — TELEPHONE ENCOUNTER
Pt called asking if theres anyway top increase the oxybutynin (DITROPAN) 5 MG tablet. She feels like what she has isn't as effective anymore.      Pharmacy: [facility]

## 2018-08-07 ENCOUNTER — CARE COORDINATION (OUTPATIENT)
Dept: CARE COORDINATION | Age: 58
End: 2018-08-07

## 2018-08-13 ENCOUNTER — TELEPHONE (OUTPATIENT)
Dept: FAMILY MEDICINE CLINIC | Age: 58
End: 2018-08-13

## 2018-08-13 DIAGNOSIS — R35.0 URINARY FREQUENCY: ICD-10-CM

## 2018-08-13 RX ORDER — OXYBUTYNIN CHLORIDE 5 MG/1
TABLET ORAL
Qty: 90 TABLET | Refills: 5 | Status: SHIPPED | OUTPATIENT
Start: 2018-08-13 | End: 2018-10-15 | Stop reason: SDUPTHER

## 2018-08-13 NOTE — TELEPHONE ENCOUNTER
Taken care on another TE. Patient stated that she would just stick with the 5mg for now.                        4 Flora Torrez Assistant Signed  Creation Time: 08/13/2018 11:02 AM   Bookmark Copy       LM to call office   Odalis Torres MD Physician Signed  Creation Time: 08/13/2018 9:59 AM   Bookmark Copy       There is no immediate release 10 mg oxybutynin. Is a 5 mg immediate release oxybutynin that she can take 3 times a day or there is a 5 and 10 mg once a day                        4 Flora Torrez Assistant Signed  Creation Time: 08/13/2018 9:30 AM   Bookmark Copy       She said she was taking oxybuytin 5MG TID, and she is requesting 10MG TID instead of once a day. Please advise.

## 2018-08-15 DIAGNOSIS — E87.6 HYPOKALEMIA: ICD-10-CM

## 2018-08-15 RX ORDER — FUROSEMIDE 40 MG/1
TABLET ORAL
Qty: 30 TABLET | Refills: 1 | Status: SHIPPED | OUTPATIENT
Start: 2018-08-15 | End: 2018-09-06 | Stop reason: SDUPTHER

## 2018-08-15 RX ORDER — POTASSIUM CHLORIDE 20 MEQ/1
TABLET, EXTENDED RELEASE ORAL
Qty: 30 TABLET | Refills: 1 | Status: SHIPPED | OUTPATIENT
Start: 2018-08-15 | End: 2018-10-01 | Stop reason: SDUPTHER

## 2018-08-20 DIAGNOSIS — E03.4 HYPOTHYROIDISM DUE TO ACQUIRED ATROPHY OF THYROID: ICD-10-CM

## 2018-08-20 RX ORDER — LEVOTHYROXINE SODIUM 0.1 MG/1
TABLET ORAL
Qty: 30 TABLET | Refills: 0 | Status: SHIPPED | OUTPATIENT
Start: 2018-08-20 | End: 2018-08-20 | Stop reason: SDUPTHER

## 2018-08-20 RX ORDER — LEVOTHYROXINE SODIUM 0.1 MG/1
TABLET ORAL
Qty: 30 TABLET | Refills: 2 | Status: SHIPPED | OUTPATIENT
Start: 2018-08-20 | End: 2018-10-15 | Stop reason: SDUPTHER

## 2018-09-06 ENCOUNTER — OFFICE VISIT (OUTPATIENT)
Dept: CARDIOLOGY CLINIC | Age: 58
End: 2018-09-06

## 2018-09-06 VITALS
SYSTOLIC BLOOD PRESSURE: 130 MMHG | HEIGHT: 64 IN | DIASTOLIC BLOOD PRESSURE: 88 MMHG | WEIGHT: 176.8 LBS | BODY MASS INDEX: 30.19 KG/M2 | HEART RATE: 84 BPM

## 2018-09-06 DIAGNOSIS — Z98.890 S/P ABLATION OF ACCESSORY BYPASS TRACT: ICD-10-CM

## 2018-09-06 DIAGNOSIS — I21.4 NSTEMI (NON-ST ELEVATED MYOCARDIAL INFARCTION) (HCC): ICD-10-CM

## 2018-09-06 DIAGNOSIS — I25.110 CORONARY ARTERY DISEASE INVOLVING NATIVE CORONARY ARTERY OF NATIVE HEART WITH UNSTABLE ANGINA PECTORIS (HCC): Primary | ICD-10-CM

## 2018-09-06 DIAGNOSIS — E03.9 ACQUIRED HYPOTHYROIDISM: ICD-10-CM

## 2018-09-06 DIAGNOSIS — E66.09 NON MORBID OBESITY DUE TO EXCESS CALORIES: ICD-10-CM

## 2018-09-06 DIAGNOSIS — R06.02 SHORTNESS OF BREATH: ICD-10-CM

## 2018-09-06 DIAGNOSIS — I47.1 SVT (SUPRAVENTRICULAR TACHYCARDIA) (HCC): ICD-10-CM

## 2018-09-06 DIAGNOSIS — I50.32 CHRONIC DIASTOLIC CONGESTIVE HEART FAILURE (HCC): ICD-10-CM

## 2018-09-06 DIAGNOSIS — R60.0 EDEMA, LOWER EXTREMITY: ICD-10-CM

## 2018-09-06 DIAGNOSIS — E66.09 CLASS 1 OBESITY DUE TO EXCESS CALORIES WITH SERIOUS COMORBIDITY AND BODY MASS INDEX (BMI) OF 34.0 TO 34.9 IN ADULT: ICD-10-CM

## 2018-09-06 DIAGNOSIS — E78.5 DYSLIPIDEMIA: ICD-10-CM

## 2018-09-06 PROCEDURE — G8427 DOCREV CUR MEDS BY ELIG CLIN: HCPCS | Performed by: INTERNAL MEDICINE

## 2018-09-06 PROCEDURE — G8417 CALC BMI ABV UP PARAM F/U: HCPCS | Performed by: INTERNAL MEDICINE

## 2018-09-06 PROCEDURE — 99214 OFFICE O/P EST MOD 30 MIN: CPT | Performed by: INTERNAL MEDICINE

## 2018-09-06 PROCEDURE — 1036F TOBACCO NON-USER: CPT | Performed by: INTERNAL MEDICINE

## 2018-09-06 PROCEDURE — G8598 ASA/ANTIPLAT THER USED: HCPCS | Performed by: INTERNAL MEDICINE

## 2018-09-06 PROCEDURE — 3017F COLORECTAL CA SCREEN DOC REV: CPT | Performed by: INTERNAL MEDICINE

## 2018-09-06 RX ORDER — CARVEDILOL 12.5 MG/1
12.5 TABLET ORAL 2 TIMES DAILY WITH MEALS
Qty: 60 TABLET | Refills: 11 | Status: SHIPPED | OUTPATIENT
Start: 2018-09-06 | End: 2019-03-04 | Stop reason: SDUPTHER

## 2018-09-06 RX ORDER — MIDODRINE HYDROCHLORIDE 5 MG/1
5 TABLET ORAL 3 TIMES DAILY
Qty: 270 TABLET | Refills: 3 | Status: SHIPPED | OUTPATIENT
Start: 2018-09-06 | End: 2019-03-04 | Stop reason: SDUPTHER

## 2018-09-06 RX ORDER — FUROSEMIDE 40 MG/1
40 TABLET ORAL DAILY
Qty: 30 TABLET | Refills: 11 | Status: SHIPPED | OUTPATIENT
Start: 2018-09-06 | End: 2019-01-25 | Stop reason: SDUPTHER

## 2018-09-06 NOTE — LETTER
Cardiology [address] [address]  Phone: [phone]  Fax: [phone]    Tomas Shearer MD        September 6, 2018     Mari Mcdonough MD  [id number] [address]    Patient: Jaz Pruitt  MR Number: [id number]  YOB: 1960  Date of Visit: 9/6/2018    Dear Dr. Mari Mcdonough:    Thank you for the request for consultation for Puneet Rios to me for the evaluation of CAD. Below are the relevant portions of my assessment and plan of care. If you have questions, please do not hesitate to call me. I look forward to following [facility] along with you.     Sincerely,        Tomas Shearer MD

## 2018-09-06 NOTE — PROGRESS NOTES
Joni Restrepo is a 56 y.o. female who has    CHIEF COMPLAINT AS FOLLOWS:  CHEST PAIN: Patient denies any C/O chest pains at this time. SOB: No C/O SOB at this time. LEG EDEMA: B/L Lower extremity edema is present but no change over previous. PALPITATIONS: Denies any C/O Palpitations                                   DIZZINESS: No C/O Dizziness                           SYNCOPE: None   OTHER:                                     HPI: Patient is here for F/U on her CAD, HTN & Dyslipidemia problems. She does not have any complaints at this time.     Thomas Guzman has the following history recorded in care path:  Patient Active Problem List    Diagnosis Date Noted    Hematoma of groin      Priority: High    S/P ablation of accessory bypass tract 02/12/2018     Priority: High    SVT (supraventricular tachycardia) (HCC)      Priority: High    Pseudobulbar affect 04/27/2017     Priority: Low    Dyslipidemia 04/26/2016     Priority: Low    CAD (coronary artery disease)      Priority: Low    LV (left ventricular) mural thrombus      Priority: Low    Unstable angina (HCC)      Priority: Low    NSTEMI (non-ST elevated myocardial infarction) ([facility])      Priority: Low    Chronic obstructive pulmonary disease (HCC)      Priority: Low    Non morbid obesity due to excess calories      Priority: Low    Ischemic chest pain 04/16/2016     Priority: Low    Urge incontinence of urine 01/14/2016     Priority: Low    Bilateral carpal tunnel syndrome 10/29/2015     Priority: Low    Chronic systolic congestive heart failure ([facility] 10/29/2015     Priority: Low    Hypokalemia 10/29/2015     Priority: Low    Abnormal nuclear stress test      Priority: Low    CHF (congestive heart failure) ([facility] 07/14/2015     Priority: Low    Edema, lower extremity 07/14/2015     Priority: Low    Obesity 07/14/2015     Priority: Low    Anemia 12/01/2014     Priority: Low    Hypothyroidism 07/01/2014     Priority: Low    COPD (chronic obstructive pulmonary disease) ([facility] 05/02/2014     Priority: Low    Depression 05/02/2014     Priority: Low    Insomnia 05/02/2014     Priority: Low    Aphagia 05/02/2014     Priority: Low    Cerebral infarction ([facility] 04/10/2014     Priority: Low     Current Outpatient Prescriptions   Medication Sig Dispense Refill    VORTIoxetine (TRINTELLIX) 10 MG TABS tablet Take 10 mg by mouth daily      levothyroxine (SYNTHROID) 100 MCG tablet take 1 tablet by mouth once daily 30 tablet 2    potassium chloride (KLOR-CON M) 20 MEQ extended release tablet take 1 tablet by mouth once daily 30 tablet 1    furosemide (LASIX) 40 MG tablet take 1 tablet by mouth twice a day 30 tablet 1    oxybutynin (DITROPAN) 5 MG tablet take 1 tablet by mouth three times a day 90 tablet 5    nicotine (NICODERM CQ) 14 MG/24HR Place 1 patch onto the skin every 24 hours 30 patch 0    midodrine (PROAMATINE) 5 MG tablet Take 1 tablet by mouth 3 times daily 180 tablet 6    LORazepam (ATIVAN) 0.5 MG tablet take 1 tablet by mouth every 4 hours MUST LAST 30 DAYS  0    mirtazapine (REMERON) 15 MG tablet   0    traMADol (ULTRAM) 50 MG tablet take 1 tablet by mouth every 4 hours if needed for pain  0    venlafaxine (EFFEXOR XR) 75 MG extended release capsule take 1 capsule by mouth every morning INCREASE TO 2 CAPSULES EVERY MORNING IN 2 WEEKS  0    metFORMIN (GLUCOPHAGE) 500 MG tablet take 1 tablet by mouth twice a day 60 tablet 0    atorvastatin (LIPITOR) 40 MG tablet take 1 tablet by mouth once daily 30 tablet 5    fluticasone (FLONASE) 50 MCG/ACT nasal spray instill 2 sprays into each nostril once daily 16 g 5    mometasone (ASMANEX) 220 MCG/INH inhaler Inhale 2 puffs into the lungs daily 1 Inhaler 1    risperiDONE (RISPERDAL) 1 MG tablet take 1 tablet by mouth twice a day 60 tablet 0    apixaban (ELIQUIS) 5 MG TABS tablet Take 1 tablet left atrium/ventricles are normal. Mitral/Tricuspid valves normal. Mild aortic va;lve stenosis.  Hx of echocardiogram 4/18/2016    EF 35-40%. Borderline LA enlargement. 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 Mild MR/TR with trace aortic insufficiency. Mild pulmonary HTN.  Hyperlipemia     Hypertension, essential, benign     Nicotine dependence     quit 2014    NSTEMI (non-ST elevated myocardial infarction) ([address] 4/17/2015    Post PTCA     Thyroid disease     Unstable angina (HCC)     Venous doppler 10/23/2017    Normal venous duplex examination of the legs bilaterally with normal venous Doppler signals noted throughout. No evidence of thrombophlebitis is noted bilaterally in the deep and superficial veins of the legs. Past Surgical History:   Procedure Laterality Date    BREAST SURGERY Bilateral 1995    fibrocystic breast mass    CARDIAC SURGERY  2016    catherization, stent x1 placed    CARPAL TUNNEL RELEASE Bilateral     2015    COLONOSCOPY  12/13/2017    normal 10 year      As reviewed   Family History   Problem Relation Age of Onset    Stroke Mother     Stroke Father      Social History   Substance Use Topics    Smoking status: Current Every Day Smoker     Packs/day: 0.50     Types: Cigarettes     Start date: 4/6/2014    Smokeless tobacco: Never Used    Alcohol use No      Comment: hx of alcoholism      Review of Systems:    Constitutional: Negative for diaphoresis and fatigue  Psychological:Negative for anxiety or depression  HENT: Negative for headaches, nasal congestion, sinus pain or vertigo  Eyes: Negative for visual disturbance.    Endocrine: Negative for polydipsia/polyuria  Respiratory: Negative for shortness of breath  Cardiovascular: Negative for chest pain, dyspnea on exertion, claudication, edema, irregular heartbeat, murmur, palpitations or shortness of breath  Gastrointestinal: Negative for abdominal pain or heartburn  Genito-Urinary: Negative for urinary frequency/urgency  Musculoskeletal: Negative for muscle pain, muscular weakness, negative for pain in arm and leg or swelling in foot and leg  Neurological: Negative for dizziness, headaches, memory loss, numbness/tingling, visual changes, syncope  Dermatological: Negative for rash    Objective:  /88   Pulse 84   Ht 5' 4\" (1.626 m)   Wt 176 lb 12.8 oz (80.2 kg)   BMI 30.35 kg/m²   Wt Readings from Last 3 Encounters:   09/06/18 176 lb 12.8 oz (80.2 kg)   05/17/18 180 lb (81.6 kg)   03/05/18 191 lb 6.4 oz (86.8 kg)     Body mass index is 30.35 kg/m². GENERAL - Alert, oriented, pleasant, in no apparent distress. EYES: No jaundice, no conjunctival pallor. SKIN: It is warm & dry. No rashes. No Echhymosis    HEENT  No clinically significant abnormalities seen. Neck - Supple. No jugular venous distention noted. No carotid bruits. Cardiovascular  Normal S1 and S2 without obvious murmur or gallop. Extremities - No cyanosis, clubbing, or significant edema. Pulmonary  No respiratory distress. No wheezes or rales. Abdomen  No masses, tenderness, or organomegaly. Musculoskeletal  No significant edema. No joint deformities. No muscle wasting. Neurologic  Cranial nerves II through XII are grossly intact. There were no gross focal neurologic abnormalities.     Lab Review   Lab Results   Component Value Date    TROPONINT <0.010 10/05/2017     BNP:    Lab Results   Component Value Date     06/09/2015     PT/INR:    Lab Results   Component Value Date    INR 0.98 02/09/2018     Lab Results   Component Value Date    LABA1C 5.0 10/05/2017    LABA1C 5.2 04/18/2016     Lab Results   Component Value Date    WBC 6.4 02/20/2018    HCT 39.8 05/17/2018    MCV 90.9 05/17/2018     05/17/2018     Lab Results   Component Value Date    CHOL 133 05/17/2018    TRIG 93 05/17/2018    HDL 57 05/17/2018    LDLDIRECT 104 (H) 07/14/2015     Lab Results questions answered.

## 2018-09-18 ENCOUNTER — NURSE ONLY (OUTPATIENT)
Dept: CARDIOLOGY CLINIC | Age: 58
End: 2018-09-18

## 2018-09-18 VITALS
DIASTOLIC BLOOD PRESSURE: 64 MMHG | SYSTOLIC BLOOD PRESSURE: 112 MMHG | HEIGHT: 64 IN | HEART RATE: 96 BPM | WEIGHT: 186 LBS | BODY MASS INDEX: 31.76 KG/M2

## 2018-09-18 DIAGNOSIS — I10 ESSENTIAL HYPERTENSION: Primary | ICD-10-CM

## 2018-09-18 RX ORDER — LISINOPRIL 2.5 MG/1
2.5 TABLET ORAL DAILY
Qty: 30 TABLET | Refills: 3 | Status: SHIPPED | OUTPATIENT
Start: 2018-09-18 | End: 2019-01-10 | Stop reason: SDUPTHER

## 2018-09-21 ENCOUNTER — CARE COORDINATION (OUTPATIENT)
Dept: CARE COORDINATION | Age: 58
End: 2018-09-21

## 2018-10-01 ENCOUNTER — NURSE ONLY (OUTPATIENT)
Dept: CARDIOLOGY CLINIC | Age: 58
End: 2018-10-01

## 2018-10-01 VITALS
SYSTOLIC BLOOD PRESSURE: 124 MMHG | HEART RATE: 94 BPM | WEIGHT: 185.2 LBS | BODY MASS INDEX: 31.62 KG/M2 | DIASTOLIC BLOOD PRESSURE: 76 MMHG | HEIGHT: 64 IN

## 2018-10-01 DIAGNOSIS — I25.110 CORONARY ARTERY DISEASE INVOLVING NATIVE CORONARY ARTERY OF NATIVE HEART WITH UNSTABLE ANGINA PECTORIS (HCC): Primary | ICD-10-CM

## 2018-10-01 DIAGNOSIS — E87.6 HYPOKALEMIA: ICD-10-CM

## 2018-10-01 DIAGNOSIS — E03.4 HYPOTHYROIDISM DUE TO ACQUIRED ATROPHY OF THYROID: ICD-10-CM

## 2018-10-01 RX ORDER — POTASSIUM CHLORIDE 20 MEQ/1
TABLET, EXTENDED RELEASE ORAL
Qty: 30 TABLET | Refills: 0 | Status: SHIPPED | OUTPATIENT
Start: 2018-10-01 | End: 2018-10-15 | Stop reason: SDUPTHER

## 2018-10-01 RX ORDER — LEVOTHYROXINE SODIUM 0.1 MG/1
TABLET ORAL
Qty: 30 TABLET | Refills: 2 | OUTPATIENT
Start: 2018-10-01

## 2018-10-01 NOTE — PROGRESS NOTES
Spoke with Dr. Yoel Calvillo he stated he would like a bmp since Lisinopril was added to patients medication.  Follow up with Dr. Yoel Calvillo as scheduled

## 2018-10-15 ENCOUNTER — OFFICE VISIT (OUTPATIENT)
Dept: FAMILY MEDICINE CLINIC | Age: 58
End: 2018-10-15
Payer: MEDICAID

## 2018-10-15 ENCOUNTER — HOSPITAL ENCOUNTER (OUTPATIENT)
Age: 58
Discharge: HOME | End: 2018-10-15
Payer: MEDICAID

## 2018-10-15 ENCOUNTER — HOSPITAL ENCOUNTER (OUTPATIENT)
Dept: GENERAL RADIOLOGY | Age: 58
Discharge: HOME | End: 2018-10-15
Payer: MEDICAID

## 2018-10-15 VITALS
HEART RATE: 76 BPM | BODY MASS INDEX: 31.1 KG/M2 | DIASTOLIC BLOOD PRESSURE: 82 MMHG | WEIGHT: 181.2 LBS | SYSTOLIC BLOOD PRESSURE: 122 MMHG | TEMPERATURE: 96.8 F

## 2018-10-15 DIAGNOSIS — E03.4 HYPOTHYROIDISM DUE TO ACQUIRED ATROPHY OF THYROID: ICD-10-CM

## 2018-10-15 DIAGNOSIS — E87.6 HYPOKALEMIA: ICD-10-CM

## 2018-10-15 DIAGNOSIS — J44.9 CHRONIC OBSTRUCTIVE PULMONARY DISEASE, UNSPECIFIED COPD TYPE (HCC): ICD-10-CM

## 2018-10-15 DIAGNOSIS — S80.11XA HEMATOMA OF LEG, RIGHT, INITIAL ENCOUNTER: Primary | ICD-10-CM

## 2018-10-15 DIAGNOSIS — M79.604 ANTERIOR LEG PAIN, RIGHT: ICD-10-CM

## 2018-10-15 DIAGNOSIS — R35.0 URINARY FREQUENCY: ICD-10-CM

## 2018-10-15 DIAGNOSIS — E66.09 NON MORBID OBESITY DUE TO EXCESS CALORIES: ICD-10-CM

## 2018-10-15 DIAGNOSIS — I25.110 CORONARY ARTERY DISEASE INVOLVING NATIVE CORONARY ARTERY OF NATIVE HEART WITH UNSTABLE ANGINA PECTORIS (HCC): ICD-10-CM

## 2018-10-15 DIAGNOSIS — I21.4 NSTEMI (NON-ST ELEVATED MYOCARDIAL INFARCTION) (HCC): ICD-10-CM

## 2018-10-15 LAB
ALBUMIN SERPL-MCNC: 4.8 G/DL
ALP BLD-CCNC: 129 U/L
ALT SERPL-CCNC: 15 U/L
ANION GAP SERPL CALCULATED.3IONS-SCNC: NORMAL MMOL/L
AST SERPL-CCNC: 21 U/L
BILIRUB SERPL-MCNC: 0.4 MG/DL (ref 0.1–1.4)
BUN BLDV-MCNC: 13 MG/DL
CALCIUM SERPL-MCNC: 10.7 MG/DL
CHLORIDE BLD-SCNC: 86 MMOL/L
CHOLESTEROL, TOTAL: 167 MG/DL
CHOLESTEROL/HDL RATIO: NORMAL
CO2: 35 MMOL/L
CREAT SERPL-MCNC: 0.9 MG/DL
GFR CALCULATED: 72
GLUCOSE BLD-MCNC: 101 MG/DL
HDLC SERPL-MCNC: 48 MG/DL (ref 35–70)
LDL CHOLESTEROL CALCULATED: 90 MG/DL (ref 0–160)
POTASSIUM SERPL-SCNC: 4.8 MMOL/L
SODIUM BLD-SCNC: 137 MMOL/L
TOTAL PROTEIN: 7.8
TRIGL SERPL-MCNC: 145 MG/DL
TSH SERPL DL<=0.05 MIU/L-ACNC: 6.48 UIU/ML
VLDLC SERPL CALC-MCNC: 29 MG/DL

## 2018-10-15 PROCEDURE — 99214 OFFICE O/P EST MOD 30 MIN: CPT | Performed by: FAMILY MEDICINE

## 2018-10-15 PROCEDURE — 3023F SPIROM DOC REV: CPT | Performed by: FAMILY MEDICINE

## 2018-10-15 PROCEDURE — G8926 SPIRO NO PERF OR DOC: HCPCS | Performed by: FAMILY MEDICINE

## 2018-10-15 PROCEDURE — 1036F TOBACCO NON-USER: CPT | Performed by: FAMILY MEDICINE

## 2018-10-15 PROCEDURE — 3017F COLORECTAL CA SCREEN DOC REV: CPT | Performed by: FAMILY MEDICINE

## 2018-10-15 PROCEDURE — G8417 CALC BMI ABV UP PARAM F/U: HCPCS | Performed by: FAMILY MEDICINE

## 2018-10-15 PROCEDURE — 90471 IMMUNIZATION ADMIN: CPT | Performed by: FAMILY MEDICINE

## 2018-10-15 PROCEDURE — G8598 ASA/ANTIPLAT THER USED: HCPCS | Performed by: FAMILY MEDICINE

## 2018-10-15 PROCEDURE — G8482 FLU IMMUNIZE ORDER/ADMIN: HCPCS | Performed by: FAMILY MEDICINE

## 2018-10-15 PROCEDURE — 90686 IIV4 VACC NO PRSV 0.5 ML IM: CPT | Performed by: FAMILY MEDICINE

## 2018-10-15 PROCEDURE — 73590 X-RAY EXAM OF LOWER LEG: CPT

## 2018-10-15 PROCEDURE — G8427 DOCREV CUR MEDS BY ELIG CLIN: HCPCS | Performed by: FAMILY MEDICINE

## 2018-10-15 RX ORDER — OXYBUTYNIN CHLORIDE 5 MG/1
TABLET ORAL
Qty: 90 TABLET | Refills: 5 | Status: SHIPPED | OUTPATIENT
Start: 2018-10-15 | End: 2020-09-23

## 2018-10-15 RX ORDER — ATORVASTATIN CALCIUM 40 MG/1
TABLET, FILM COATED ORAL
Qty: 30 TABLET | Refills: 5 | Status: SHIPPED | OUTPATIENT
Start: 2018-10-15 | End: 2019-03-04 | Stop reason: SDUPTHER

## 2018-10-15 RX ORDER — POTASSIUM CHLORIDE 20 MEQ/1
TABLET, EXTENDED RELEASE ORAL
Qty: 30 TABLET | Refills: 5 | Status: SHIPPED | OUTPATIENT
Start: 2018-10-15 | End: 2019-03-04 | Stop reason: SDUPTHER

## 2018-10-15 RX ORDER — LEVOTHYROXINE SODIUM 0.1 MG/1
TABLET ORAL
Qty: 30 TABLET | Refills: 5 | Status: SHIPPED | OUTPATIENT
Start: 2018-10-15 | End: 2019-04-02 | Stop reason: SDUPTHER

## 2018-10-15 ASSESSMENT — PATIENT HEALTH QUESTIONNAIRE - PHQ9
SUM OF ALL RESPONSES TO PHQ QUESTIONS 1-9: 0
SUM OF ALL RESPONSES TO PHQ9 QUESTIONS 1 & 2: 0
1. LITTLE INTEREST OR PLEASURE IN DOING THINGS: 0
SUM OF ALL RESPONSES TO PHQ QUESTIONS 1-9: 0
2. FEELING DOWN, DEPRESSED OR HOPELESS: 0

## 2018-10-15 NOTE — PROGRESS NOTES
release capsule take 1 capsule by mouth every morning INCREASE TO 2 CAPSULES EVERY MORNING IN 2 WEEKS  0    metFORMIN (GLUCOPHAGE) 500 MG tablet take 1 tablet by mouth twice a day 60 tablet 0    atorvastatin (LIPITOR) 40 MG tablet take 1 tablet by mouth once daily 30 tablet 5    fluticasone (FLONASE) 50 MCG/ACT nasal spray instill 2 sprays into each nostril once daily 16 g 5    mometasone (ASMANEX) 220 MCG/INH inhaler Inhale 2 puffs into the lungs daily 1 Inhaler 1    risperiDONE (RISPERDAL) 1 MG tablet take 1 tablet by mouth twice a day 60 tablet 0    Magnesium Oxide 250 MG TABS Take 1 tablet by mouth daily 30 tablet 1    FLOVENT  MCG/ACT inhaler inhale 2 puffs by mouth twice a day 36 g 1    donepezil (ARICEPT) 10 MG tablet Take 20 mg by mouth nightly      escitalopram (LEXAPRO) 20 MG tablet take 1 tablet by mouth once daily 30 tablet 5    busPIRone (BUSPAR) 15 MG tablet Take 1 tablet by mouth 2 times daily 60 tablet 5    aspirin EC 81 MG EC tablet Take 1 tablet by mouth daily 90 tablet 3    amitriptyline (ELAVIL) 100 MG tablet Take 100 mg by mouth every evening  2    NAMZARIC 28-10 MG CP24 Take by mouth nightly   2    zonisamide (ZONEGRAN) 100 MG capsule Take 200-300 mg by mouth nightly as needed       tiZANidine (ZANAFLEX) 4 MG tablet Take 4 mg by mouth 2 times daily as needed       cyclobenzaprine (FLEXERIL) 10 MG tablet Take 20 mg by mouth 3 times daily as needed       zolpidem (AMBIEN) 10 MG tablet Take 1 tablet by mouth nightly as needed for Sleep 30 tablet 4     No current facility-administered medications for this visit.         Allergies   Allergen Reactions    Other Other (See Comments)     Vicryl sutures, Pt does not heal with Vicryl sutures         Social History   Substance Use Topics    Smoking status: Former Smoker     Packs/day: 0.50     Types: Cigarettes     Start date: 4/6/2014    Smokeless tobacco: Never Used    Alcohol use No      Comment: hx of alcoholism Objective:      /82 (Site: Right Upper Arm, Position: Sitting, Cuff Size: Medium Adult)   Pulse 76   Temp 96.8 °F (36 °C) (Temporal)   Wt 181 lb 3.2 oz (82.2 kg)   BMI 31.10 kg/m²   General: Alert and oriented, in no distress   S1 and S2 normal, no murmurs, clicks, gallops or rubs. Regular rate and rhythm. Chest is clear; no wheezes or rales. No edema or JVD. Right lower leg with a 2 cm hematoma anterior tibia. Mildly tender. No surrounding erythema or ecchymosis. Assessment:       Diagnosis Orders   1. Hematoma of leg, right, initial encounter     2. Anterior leg pain, right  XR TIBIA FIBULA RIGHT (2 VIEWS)   3. Urinary frequency  oxybutynin (DITROPAN) 5 MG tablet   4. Hypokalemia  potassium chloride (KLOR-CON M) 20 MEQ extended release tablet   5. NSTEMI (non-ST elevated myocardial infarction) ([facility])  atorvastatin (LIPITOR) 40 MG tablet    Lipid Panel    Comprehensive Metabolic Panel   6. Coronary artery disease involving native coronary artery of native heart with unstable angina pectoris ([facility])  atorvastatin (LIPITOR) 40 MG tablet    Lipid Panel    Comprehensive Metabolic Panel   7. Non morbid obesity due to excess calories  metFORMIN (GLUCOPHAGE) 500 MG tablet   8. Chronic obstructive pulmonary disease, unspecified COPD type ([facility])  mometasone (ASMANEX) 220 MCG/INH inhaler   9. Hypothyroidism due to acquired atrophy of thyroid  levothyroxine (SYNTHROID) 100 MCG tablet    TSH without Reflex          Plan:       1)  Medication: continue current medication regimen unchanged  2)  Recheck in 6 months, sooner should new symptoms or problems arise. Aneudy Mendenhall received counseling on the following healthy behaviors: nutrition, exercise and medication adherence    Patient given educational materials on Hypertension    I have instructed Aneudy Mendenhall to complete a self tracking handout on Blood Pressures  and instructed them to bring it with them to her next appointment.      Discussed use, benefit, and side effects of prescribed medications. Barriers to medication compliance addressed. All patient questions answered. Pt voiced understanding.

## 2018-10-15 NOTE — PROGRESS NOTES
Vaccine Information Sheet, \"Influenza - Inactivated\"  given to Dayanara Martínez, or parent/legal guardian of  Dayanara Martínez and verbalized understanding. Patient responses:    Have you ever had a reaction to a flu vaccine? No  Are you able to eat eggs without adverse effects? Yes  Do you have any current illness? No  Have you ever had Guillian Hartford Syndrome? No    Flu vaccine given per order. Please see immunization tab.

## 2018-10-15 NOTE — PATIENT INSTRUCTIONS
dose, do NOT take a double dose of medicine. Take your usual dose the next day. · Tell your doctor about all prescription, herbal, or over-the-counter products you take. · Take care of yourself. Eat a healthy diet, get enough sleep, and get regular exercise. When should you call for help? Call 911 anytime you think you may need emergency care. For example, call if:    · You passed out (lost consciousness).     · You have severe trouble breathing.     · You have a very slow heartbeat (less than 60 beats a minute).     · You have a low body temperature (95°F or below).    Call your doctor now or seek immediate medical care if:    · You feel tired, sluggish, or weak.     · You have trouble remembering things or concentrating.     · You do not begin to feel better 2 weeks after starting your medicine.    Watch closely for changes in your health, and be sure to contact your doctor if you have any problems. Where can you learn more? Go to [email or web address] and sign in to your [employer] account. Enter L184 in the PharmMD box to learn more about \"Hypothyroidism: Care Instructions. \"     If you do not have an account, please click on the \"Sign Up Now\" link. Current as of: May 12, 2017  Content Version: 11.7  © 5998-4770 [employer]. Care instructions adapted under license by [facility] ([facility]). If you have questions about a medical condition or this instruction, always ask your healthcare professional. [place] [address] any warranty or liability for your use of this information.

## 2018-11-16 ENCOUNTER — CARE COORDINATION (OUTPATIENT)
Dept: CARE COORDINATION | Age: 58
End: 2018-11-16

## 2018-12-13 ENCOUNTER — TELEPHONE (OUTPATIENT)
Dept: FAMILY MEDICINE CLINIC | Age: 58
End: 2018-12-13

## 2019-01-11 ENCOUNTER — CARE COORDINATION (OUTPATIENT)
Dept: CARE COORDINATION | Age: 59
End: 2019-01-11

## 2019-01-11 RX ORDER — LISINOPRIL 2.5 MG/1
2.5 TABLET ORAL DAILY
Qty: 30 TABLET | Refills: 6 | Status: SHIPPED | OUTPATIENT
Start: 2019-01-11 | End: 2019-03-04 | Stop reason: SDUPTHER

## 2019-01-16 RX ORDER — FUROSEMIDE 40 MG/1
40 TABLET ORAL DAILY
Qty: 30 TABLET | Refills: 11 | OUTPATIENT
Start: 2019-01-16

## 2019-01-25 ENCOUNTER — TELEPHONE (OUTPATIENT)
Dept: CARDIOLOGY CLINIC | Age: 59
End: 2019-01-25

## 2019-01-25 ENCOUNTER — HOSPITAL ENCOUNTER (EMERGENCY)
Age: 59
Discharge: HOME | End: 2019-01-25
Payer: MEDICAID

## 2019-01-25 VITALS
BODY MASS INDEX: 28.17 KG/M2 | HEART RATE: 73 BPM | OXYGEN SATURATION: 99 % | WEIGHT: 165 LBS | DIASTOLIC BLOOD PRESSURE: 67 MMHG | RESPIRATION RATE: 18 BRPM | SYSTOLIC BLOOD PRESSURE: 120 MMHG | TEMPERATURE: 97.7 F | HEIGHT: 64 IN

## 2019-01-25 DIAGNOSIS — S61.412A LACERATION OF LEFT HAND, FOREIGN BODY PRESENCE UNSPECIFIED, INITIAL ENCOUNTER: Primary | ICD-10-CM

## 2019-01-25 PROCEDURE — 99282 EMERGENCY DEPT VISIT SF MDM: CPT

## 2019-01-25 RX ORDER — FUROSEMIDE 40 MG/1
TABLET ORAL
Qty: 60 TABLET | Refills: 0 | Status: SHIPPED | OUTPATIENT
Start: 2019-01-25 | End: 2019-01-31 | Stop reason: SDUPTHER

## 2019-01-25 ASSESSMENT — PAIN SCALES - GENERAL: PAINLEVEL_OUTOF10: 6

## 2019-01-31 DIAGNOSIS — J30.89 PERENNIAL ALLERGIC RHINITIS: ICD-10-CM

## 2019-01-31 RX ORDER — FUROSEMIDE 40 MG/1
40 TABLET ORAL 2 TIMES DAILY
Qty: 60 TABLET | Refills: 1 | Status: SHIPPED | OUTPATIENT
Start: 2019-01-31 | End: 2019-03-04 | Stop reason: SDUPTHER

## 2019-02-01 RX ORDER — FLUTICASONE PROPIONATE 50 MCG
SPRAY, SUSPENSION (ML) NASAL
Qty: 16 G | Refills: 5 | Status: SHIPPED | OUTPATIENT
Start: 2019-02-01 | End: 2021-04-01 | Stop reason: SDUPTHER

## 2019-03-04 ENCOUNTER — OFFICE VISIT (OUTPATIENT)
Dept: CARDIOLOGY CLINIC | Age: 59
End: 2019-03-04
Payer: MEDICAID

## 2019-03-04 ENCOUNTER — HOSPITAL ENCOUNTER (OUTPATIENT)
Age: 59
Discharge: HOME | End: 2019-03-04
Payer: MEDICAID

## 2019-03-04 VITALS
DIASTOLIC BLOOD PRESSURE: 68 MMHG | HEIGHT: 64 IN | SYSTOLIC BLOOD PRESSURE: 110 MMHG | BODY MASS INDEX: 29.06 KG/M2 | HEART RATE: 72 BPM | WEIGHT: 170.2 LBS

## 2019-03-04 DIAGNOSIS — E78.5 DYSLIPIDEMIA: ICD-10-CM

## 2019-03-04 DIAGNOSIS — E87.6 HYPOKALEMIA: ICD-10-CM

## 2019-03-04 DIAGNOSIS — R06.02 SHORTNESS OF BREATH: ICD-10-CM

## 2019-03-04 DIAGNOSIS — I50.32 CHRONIC DIASTOLIC CONGESTIVE HEART FAILURE (HCC): ICD-10-CM

## 2019-03-04 DIAGNOSIS — E66.09 CLASS 1 OBESITY DUE TO EXCESS CALORIES WITH SERIOUS COMORBIDITY AND BODY MASS INDEX (BMI) OF 34.0 TO 34.9 IN ADULT: ICD-10-CM

## 2019-03-04 DIAGNOSIS — I21.4 NSTEMI (NON-ST ELEVATED MYOCARDIAL INFARCTION) (HCC): ICD-10-CM

## 2019-03-04 DIAGNOSIS — F17.210 TOBACCO DEPENDENCE DUE TO CIGARETTES: ICD-10-CM

## 2019-03-04 DIAGNOSIS — I47.1 SVT (SUPRAVENTRICULAR TACHYCARDIA) (HCC): ICD-10-CM

## 2019-03-04 DIAGNOSIS — I25.110 CORONARY ARTERY DISEASE INVOLVING NATIVE CORONARY ARTERY OF NATIVE HEART WITH UNSTABLE ANGINA PECTORIS (HCC): Primary | ICD-10-CM

## 2019-03-04 LAB
ANION GAP SERPL CALCULATED.3IONS-SCNC: 13 MMOL/L (ref 4–16)
BUN BLDV-MCNC: 7 MG/DL (ref 6–23)
CALCIUM SERPL-MCNC: 9.7 MG/DL (ref 8.3–10.6)
CHLORIDE BLD-SCNC: 102 MMOL/L (ref 99–110)
CO2: 23 MMOL/L (ref 21–32)
CREAT SERPL-MCNC: 0.8 MG/DL (ref 0.6–1.1)
GFR AFRICAN AMERICAN: >60 ML/MIN/1.73M2
GFR NON-AFRICAN AMERICAN: >60 ML/MIN/1.73M2
GLUCOSE FASTING: 91 MG/DL (ref 70–99)
POTASSIUM SERPL-SCNC: 4.8 MMOL/L (ref 3.5–5.1)
SODIUM BLD-SCNC: 138 MMOL/L (ref 135–145)

## 2019-03-04 PROCEDURE — 80048 BASIC METABOLIC PNL TOTAL CA: CPT

## 2019-03-04 PROCEDURE — 36415 COLL VENOUS BLD VENIPUNCTURE: CPT

## 2019-03-04 PROCEDURE — 99214 OFFICE O/P EST MOD 30 MIN: CPT | Performed by: NURSE PRACTITIONER

## 2019-03-04 RX ORDER — NICOTINE 21 MG/24HR
1 PATCH, TRANSDERMAL 24 HOURS TRANSDERMAL DAILY
Qty: 45 PATCH | Refills: 0 | Status: SHIPPED | OUTPATIENT
Start: 2019-03-04 | End: 2019-10-30

## 2019-03-04 RX ORDER — POTASSIUM CHLORIDE 20 MEQ/1
TABLET, EXTENDED RELEASE ORAL
Qty: 30 TABLET | Refills: 5 | Status: SHIPPED | OUTPATIENT
Start: 2019-03-04 | End: 2020-04-02 | Stop reason: ALTCHOICE

## 2019-03-04 RX ORDER — NICOTINE 21 MG/24HR
1 PATCH, TRANSDERMAL 24 HOURS TRANSDERMAL DAILY
Qty: 14 PATCH | Refills: 1 | Status: SHIPPED | OUTPATIENT
Start: 2019-04-04 | End: 2019-10-30

## 2019-03-04 RX ORDER — FUROSEMIDE 40 MG/1
40 TABLET ORAL 2 TIMES DAILY
Qty: 60 TABLET | Refills: 11 | Status: SHIPPED | OUTPATIENT
Start: 2019-03-04 | End: 2019-10-30 | Stop reason: SDUPTHER

## 2019-03-04 RX ORDER — MIDODRINE HYDROCHLORIDE 5 MG/1
5 TABLET ORAL 3 TIMES DAILY
Qty: 270 TABLET | Refills: 3 | Status: SHIPPED | OUTPATIENT
Start: 2019-03-04 | End: 2019-10-30

## 2019-03-04 RX ORDER — CARVEDILOL 12.5 MG/1
12.5 TABLET ORAL 2 TIMES DAILY WITH MEALS
Qty: 60 TABLET | Refills: 11 | Status: SHIPPED | OUTPATIENT
Start: 2019-03-04 | End: 2019-10-30 | Stop reason: SDUPTHER

## 2019-03-04 RX ORDER — LISINOPRIL 2.5 MG/1
2.5 TABLET ORAL DAILY
Qty: 30 TABLET | Refills: 11 | Status: SHIPPED | OUTPATIENT
Start: 2019-03-04 | End: 2019-08-16 | Stop reason: ALTCHOICE

## 2019-03-04 RX ORDER — ATORVASTATIN CALCIUM 40 MG/1
TABLET, FILM COATED ORAL
Qty: 30 TABLET | Refills: 11 | Status: SHIPPED | OUTPATIENT
Start: 2019-03-04 | End: 2020-03-19 | Stop reason: SDUPTHER

## 2019-04-02 ENCOUNTER — OFFICE VISIT (OUTPATIENT)
Dept: FAMILY MEDICINE CLINIC | Age: 59
End: 2019-04-02
Payer: MEDICAID

## 2019-04-02 VITALS
TEMPERATURE: 96 F | BODY MASS INDEX: 28.97 KG/M2 | WEIGHT: 168.8 LBS | OXYGEN SATURATION: 97 % | DIASTOLIC BLOOD PRESSURE: 66 MMHG | HEART RATE: 90 BPM | SYSTOLIC BLOOD PRESSURE: 118 MMHG

## 2019-04-02 DIAGNOSIS — Z11.59 NEED FOR HEPATITIS C SCREENING TEST: ICD-10-CM

## 2019-04-02 DIAGNOSIS — N32.81 OAB (OVERACTIVE BLADDER): Primary | ICD-10-CM

## 2019-04-02 DIAGNOSIS — Z12.31 BREAST CANCER SCREENING BY MAMMOGRAM: ICD-10-CM

## 2019-04-02 DIAGNOSIS — R35.0 URINARY FREQUENCY: ICD-10-CM

## 2019-04-02 DIAGNOSIS — E66.09 NON MORBID OBESITY DUE TO EXCESS CALORIES: ICD-10-CM

## 2019-04-02 DIAGNOSIS — E03.4 HYPOTHYROIDISM DUE TO ACQUIRED ATROPHY OF THYROID: ICD-10-CM

## 2019-04-02 DIAGNOSIS — J44.9 CHRONIC OBSTRUCTIVE PULMONARY DISEASE, UNSPECIFIED COPD TYPE (HCC): ICD-10-CM

## 2019-04-02 DIAGNOSIS — Z11.4 SCREENING FOR HIV (HUMAN IMMUNODEFICIENCY VIRUS): ICD-10-CM

## 2019-04-02 LAB — ANTIBODY: NEGATIVE

## 2019-04-02 PROCEDURE — 3017F COLORECTAL CA SCREEN DOC REV: CPT | Performed by: FAMILY MEDICINE

## 2019-04-02 PROCEDURE — 1036F TOBACCO NON-USER: CPT | Performed by: FAMILY MEDICINE

## 2019-04-02 PROCEDURE — 3023F SPIROM DOC REV: CPT | Performed by: FAMILY MEDICINE

## 2019-04-02 PROCEDURE — G8417 CALC BMI ABV UP PARAM F/U: HCPCS | Performed by: FAMILY MEDICINE

## 2019-04-02 PROCEDURE — G8926 SPIRO NO PERF OR DOC: HCPCS | Performed by: FAMILY MEDICINE

## 2019-04-02 PROCEDURE — 99214 OFFICE O/P EST MOD 30 MIN: CPT | Performed by: FAMILY MEDICINE

## 2019-04-02 PROCEDURE — G8598 ASA/ANTIPLAT THER USED: HCPCS | Performed by: FAMILY MEDICINE

## 2019-04-02 PROCEDURE — G8428 CUR MEDS NOT DOCUMENT: HCPCS | Performed by: FAMILY MEDICINE

## 2019-04-02 RX ORDER — LEVOTHYROXINE SODIUM 0.1 MG/1
TABLET ORAL
Qty: 30 TABLET | Refills: 5 | Status: SHIPPED | OUTPATIENT
Start: 2019-04-02 | End: 2019-10-02 | Stop reason: SDUPTHER

## 2019-04-02 RX ORDER — FLUTICASONE PROPIONATE 220 UG/1
AEROSOL, METERED RESPIRATORY (INHALATION)
Qty: 3 INHALER | Refills: 1 | Status: SHIPPED | OUTPATIENT
Start: 2019-04-02 | End: 2019-10-02 | Stop reason: SDUPTHER

## 2019-04-02 ASSESSMENT — PATIENT HEALTH QUESTIONNAIRE - PHQ9
1. LITTLE INTEREST OR PLEASURE IN DOING THINGS: 0
SUM OF ALL RESPONSES TO PHQ QUESTIONS 1-9: 0
2. FEELING DOWN, DEPRESSED OR HOPELESS: 0
SUM OF ALL RESPONSES TO PHQ9 QUESTIONS 1 & 2: 0
SUM OF ALL RESPONSES TO PHQ QUESTIONS 1-9: 0

## 2019-04-02 NOTE — PROGRESS NOTES
Subjective:      Promise Patel is a 66 y.o. female who presents for evaluation of hypertension and hyperlipidemia. She indicates that she is feeling well and denies any symptoms referable to her elevated blood pressure. Specifically denies chest pain, palpitations, dyspnea, orthopnea, PND or peripheral edema. No anorexia, arthralgia, or leg cramps noted. Current medication regimen is as listed below. She denies any side effects of medication, and has been taking it regularly. Cardiology prescribes HTN medications    Hypothyroidism: Patient presents for evaluation of thyroid function. Symptoms consist of denies fatigue, weight changes, heat/cold intolerance, bowel/skin changes or CVS symptoms. Symptoms have present for several years. The symptoms are mild. The problem has been controlled. Previous thyroid studies include TSH. The hypothyroidism is due to hypothyroidism. She feels like it under control. Depression: Patient complains of depression with anxiety. She complains of depressed mood, insomnia and psychomotor agitation. Onset was approximately several years ago, controlled since that time. She denies current suicidal and homicidal plan or intent. Family history significant for no psychiatric illness. Possible organic causes contributing are: none. Risk factors: previous episode of depression Previous treatment includes Lexapro, Risperdal, and prn ativan ( about 3 times per month). She complains of the following side effects from the treatment: none. She is managed by psychiatry now. COPD: Patient complains of dyspnea. Symptoms began several years ago. Symptoms chronic dyspnea does worsen with exertion. Sputum is clear  in small amounts. Fever has been absent. Patient uses 1 pillows at night. Patient can walk 200 feet before resting. Patient currently is not on home oxygen therapy. Malou Carlton Respiratory history: COPD     Urinary Incontinence: Patient complains of urinary incontinence.  This has been present for several years. She leaks urine with with urge. Patient describes the symptoms as  urge to urinate with little or no warning. Factors associated with symptoms include worse since CVA. Evaluation to date includes none. Treatment to date includes oxybutinin, which was effective. She wants to switch back Myrbetriq if possible. Symptoms were better controlled    ROS: No TIA's or dysphagia. No prolonged cough. No dyspnea or chest pain on exertion. No abdominal pain, change in bowel habits, black or bloody stools. No urinary tract symptoms. She is post menopausal. No hot flashes, abnormal vaginal bleeding, discharge or unexpected pelvic pain. No new breast lumps, breast pain or nipple discharge. Past Medical History:   Diagnosis Date    Abnormal nuclear stress test     ACS [facility]acute coronary syndrome) ([facility])     Arterial ischemic stroke, MCA (middle cerebral artery), left, acute ([address]     CAD (coronary artery disease)     CHF (congestive heart failure) ([facility])     COPD (chronic obstructive pulmonary disease) ([facility])     CVA (cerebral vascular accident) ([address] 4/6/2014    H/O cardiovascular stress test 5/19/2016    treadmill    History of left heart catheterization 4/17/2016    Severe 2 vessel disease, but 3 vessel disease. 2.5x30 Resolute Stent placed to the LAD. Successful angio-seal deployment w/ excellent results.  Hx of cardiovascular stress test 6/22/2015    lexiscan-scar,EF47%    Hx of echocardiogram 4/8/2014    VALDEMAR: EF 40-45%. Right and left atrium/ventricles are normal. Mitral/Tricuspid valves normal. Mild aortic va;lve stenosis.  Hx of echocardiogram 4/18/2016    EF 35-40%. Borderline LA enlargement. 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 Mild MR/TR with trace aortic insufficiency. Mild pulmonary HTN.      Hyperlipemia     Hypertension, essential, benign     Nicotine dependence     quit 2014    NSTEMI [facility]non-ST elevated [facility] infarction) [facility] 4/17/2015    Post PTCA     Thyroid disease     Unstable angina (HCC)     Venous doppler 10/23/2017    Normal venous duplex examination of the legs bilaterally with normal venous Doppler signals noted throughout. No evidence of thrombophlebitis is noted bilaterally in the deep and superficial veins of the legs.      Past Surgical History:   Procedure Laterality Date    BREAST SURGERY Bilateral 1995    fibrocystic breast mass    CARDIAC SURGERY  2016    catherization, stent x1 placed    CARPAL TUNNEL RELEASE Bilateral     2015    COLONOSCOPY  12/13/2017    normal 10 year          Current Outpatient Medications   Medication Sig Dispense Refill    levothyroxine (SYNTHROID) 100 MCG tablet take 1 tablet by mouth once daily 30 tablet 5    fluticasone (FLOVENT HFA) 220 MCG/ACT inhaler inhale 2 puffs by mouth twice a day 3 Inhaler 1    metFORMIN (GLUCOPHAGE) 500 MG tablet take 1 tablet by mouth twice a day 60 tablet 5    Mirabegron ER 25 MG TB24 Take 1 tablet by mouth daily 90 tablet 1    furosemide (LASIX) 40 MG tablet Take 1 tablet by mouth 2 times daily 60 tablet 11    lisinopril (PRINIVIL;ZESTRIL) 2.5 MG tablet Take 1 tablet by mouth daily 30 tablet 11    atorvastatin (LIPITOR) 40 MG tablet take 1 tablet by mouth once daily 30 tablet 11    apixaban (ELIQUIS) 5 MG TABS tablet Take 1 tablet by mouth 2 times daily 60 tablet 11    carvedilol (COREG) 12.5 MG tablet Take 1 tablet by mouth 2 times daily (with meals) 60 tablet 11    potassium chloride (KLOR-CON M) 20 MEQ extended release tablet take 1 tablet by mouth once daily 30 tablet 5    nicotine (NICODERM CQ) 21 MG/24HR Place 1 patch onto the skin daily 45 patch 0    [START ON 4/4/2019] nicotine (NICODERM CQ) 14 MG/24HR Place 1 patch onto the skin daily for 14 days 14 patch 1    [START ON 5/3/2019] nicotine (NICODERM CQ) 7 MG/24HR Place 1 patch onto the skin daily for 14 days 14 patch 1    midodrine (PROAMATINE) 5 MG tablet Take 1 tablet by mouth 3 times daily 270 tablet 3    fluticasone (FLONASE) 50 MCG/ACT nasal spray instill 2 sprays into each nostril once daily 16 g 5    oxybutynin (DITROPAN) 5 MG tablet take 1 tablet by mouth three times a day 90 tablet 5    VORTIoxetine (TRINTELLIX) 10 MG TABS tablet Take 10 mg by mouth daily      nicotine (NICODERM CQ) 14 MG/24HR Place 1 patch onto the skin every 24 hours 30 patch 0    LORazepam (ATIVAN) 0.5 MG tablet take 1 tablet by mouth every 4 hours MUST LAST 30 DAYS  0    venlafaxine (EFFEXOR XR) 75 MG extended release capsule take 1 capsule by mouth every morning INCREASE TO 2 CAPSULES EVERY MORNING IN 2 WEEKS  0    donepezil (ARICEPT) 10 MG tablet Take 20 mg by mouth nightly      busPIRone (BUSPAR) 15 MG tablet Take 1 tablet by mouth 2 times daily 60 tablet 5    aspirin EC 81 MG EC tablet Take 1 tablet by mouth daily 90 tablet 3    amitriptyline (ELAVIL) 100 MG tablet Take 100 mg by mouth every evening  2    NAMZARIC 28-10 MG CP24 Take by mouth nightly   2     No current facility-administered medications for this visit. Allergies   Allergen Reactions    Other Other (See Comments)     Vicryl sutures, Pt does not heal with Vicryl sutures         Social History     Tobacco Use    Smoking status: Former Smoker     Packs/day: 0.75     Types: Cigarettes     Start date: 4/6/2014    Smokeless tobacco: Never Used   Substance Use Topics    Alcohol use: No     Alcohol/week: 0.0 oz     Comment: hx of alcoholism          Objective:      /66 (Site: Left Upper Arm, Position: Sitting, Cuff Size: Medium Adult)   Pulse 90   Temp 96 °F (35.6 °C) (Temporal)   Wt 168 lb 12.8 oz (76.6 kg)   SpO2 97%   BMI 28.97 kg/m²   General: Alert and oriented, in no distress   S1 and S2 normal, no murmurs, clicks, gallops or rubs. Regular rate and rhythm. Chest is clear; no wheezes or rales. No edema or JVD. Right lower leg with a 2 cm hematoma anterior tibia. Mildly tender.   No

## 2019-04-02 NOTE — PATIENT INSTRUCTIONS
Patient Education        Learning About Saving Energy When You Have a Chronic Condition  Introduction    Everyday tasks can be tiring when you have COPD, heart failure, or another long-term (chronic) condition. You may feel at times that you've lost your ability to live your life. But learning to conserve, or save, your energy can help you be less tired. Conserving your energy means finding ways of doing daily activities with as little effort as possible. With some small changes in the way you do things, you can get your tasks done more easily. Some treatments are available that might help. Pulmonary rehabilitation can teach you ways to breathe easier. Cardiac rehabilitation can help make your heart stronger. You also may want to see an occupational or physical therapist. The therapist can give you more tips on building strength and moving with less effort. What can you do to conserve your energy? Planning  · Make a list of what you have to do every day. Group the tasks by location. · Do all the chores in one part of your house around the same time. · Go out for errands or do chores at the time of day when you have the most energy. · Plan rest periods into your day. Getting things done  · Sit down as often as you can when you get dressed, do chores, or cook. · Use a cart with wheels to roll items, such as laundry, from one room to another. · Push or slide boxes or other large items instead of lifting them. Reaching and bending  · Put things you use the most on shelves that are at the level of your waist or shoulder. · Use long-handled grabbers or other tools to reach items on a high shelf or to  things off the floor. Use long-handled dusters when you clean the house. · Use a raised toilet seat to avoid bending too far to sit or stand up. Eating  · Eat several small meals instead of three larger meals. · If you get too tired to eat much, try to choose healthy foods that have more calories. Have a yogurt-and-fruit smoothie for breakfast. Put avocado on a sandwich. Or add cheese or peanut butter to snacks. · If you don't feel very hungry, try to eat first and drink water or other fluids later, after a meal. This can help keep you from losing weight. Sip small amounts of fluids if you need to drink while you eat. Having sex  · Choose the time of day when you have more energy. · A dypw-hd-hyve position for sex can be less tiring. Sometimes you may want to focus more on caressing. Watch closely for changes in your health, and be sure to contact your doctor if you have any problems. Where can you learn more? Go to https://ProviderTrustpeInExchangeeb.TRIBAX. org and sign in to your [employer] account. Enter H190 in the Jigsaw box to learn more about \"Learning About Saving Energy When You Have a Chronic Condition. \"     If you do not have an account, please click on the \"Sign Up Now\" link. Current as of: September 5, 2018  Content Version: 11.9  © 5901-5454 [employer]. Care instructions adapted under license by [facility] ([facility]). If you have questions about a medical condition or this instruction, always ask your healthcare professional. [address] any warranty or liability for your use of this information.

## 2019-04-03 LAB
COPY(IES) SENT TO:: NORMAL
HEPATITIS C ANTIBODY: NEGATIVE
HIV AG/AB: NORMAL
TSH SERPL DL<=0.05 MIU/L-ACNC: 0.54 MICRO IU/ML (ref 0.4–4.5)

## 2019-04-04 RX ORDER — OXYBUTYNIN CHLORIDE 10 MG/1
10 TABLET, EXTENDED RELEASE ORAL DAILY
Qty: 90 TABLET | Refills: 1 | Status: SHIPPED | OUTPATIENT
Start: 2019-04-04 | End: 2019-10-02 | Stop reason: SDUPTHER

## 2019-07-16 ENCOUNTER — OFFICE VISIT (OUTPATIENT)
Dept: FAMILY MEDICINE CLINIC | Age: 59
End: 2019-07-16
Payer: MEDICAID

## 2019-07-16 VITALS
OXYGEN SATURATION: 98 % | HEART RATE: 79 BPM | BODY MASS INDEX: 29.39 KG/M2 | TEMPERATURE: 95.3 F | SYSTOLIC BLOOD PRESSURE: 122 MMHG | WEIGHT: 171.2 LBS | DIASTOLIC BLOOD PRESSURE: 66 MMHG

## 2019-07-16 DIAGNOSIS — T78.40XA ALLERGIC REACTION, INITIAL ENCOUNTER: Primary | ICD-10-CM

## 2019-07-16 PROCEDURE — 3017F COLORECTAL CA SCREEN DOC REV: CPT | Performed by: FAMILY MEDICINE

## 2019-07-16 PROCEDURE — 1036F TOBACCO NON-USER: CPT | Performed by: FAMILY MEDICINE

## 2019-07-16 PROCEDURE — G8417 CALC BMI ABV UP PARAM F/U: HCPCS | Performed by: FAMILY MEDICINE

## 2019-07-16 PROCEDURE — G8428 CUR MEDS NOT DOCUMENT: HCPCS | Performed by: FAMILY MEDICINE

## 2019-07-16 PROCEDURE — G8598 ASA/ANTIPLAT THER USED: HCPCS | Performed by: FAMILY MEDICINE

## 2019-07-16 PROCEDURE — 99213 OFFICE O/P EST LOW 20 MIN: CPT | Performed by: FAMILY MEDICINE

## 2019-07-16 RX ORDER — HYDROXYZINE 50 MG/1
50 TABLET, FILM COATED ORAL EVERY 8 HOURS PRN
Qty: 90 TABLET | Refills: 0 | Status: SHIPPED | OUTPATIENT
Start: 2019-07-16 | End: 2019-10-02

## 2019-07-16 ASSESSMENT — PATIENT HEALTH QUESTIONNAIRE - PHQ9
SUM OF ALL RESPONSES TO PHQ QUESTIONS 1-9: 0
1. LITTLE INTEREST OR PLEASURE IN DOING THINGS: 0
2. FEELING DOWN, DEPRESSED OR HOPELESS: 0
SUM OF ALL RESPONSES TO PHQ9 QUESTIONS 1 & 2: 0
SUM OF ALL RESPONSES TO PHQ QUESTIONS 1-9: 0

## 2019-07-16 NOTE — PROGRESS NOTES
injection. Mild MR/TR with trace aortic insufficiency. Mild pulmonary HTN.  Hyperlipemia     Hypertension, essential, benign     Nicotine dependence     quit 2014    NSTEMI (non-ST elevated myocardial infarction) ([facility] [address] 4/17/2015    Post PTCA     Thyroid disease     Unstable angina (HCC)     Venous doppler 10/23/2017    Normal venous duplex examination of the legs bilaterally with normal venous Doppler signals noted throughout. No evidence of thrombophlebitis is noted bilaterally in the deep and superficial veins of the legs. Past Surgical History:   Procedure Laterality Date    BREAST SURGERY Bilateral 1995    fibrocystic breast mass    CARDIAC SURGERY  2016    catherization, stent x1 placed    CARPAL TUNNEL RELEASE Bilateral     2015    COLONOSCOPY  12/13/2017    normal 10 year     O:   Vitals:    07/16/19 1046   BP: 122/66   Pulse: 79   Temp: 95.3 °F (35.2 °C)   SpO2: 98%     No acute distress. Alert and Oriented x 3  HEENT: Atraumatic. Normocephalic. PERRLA, EOMI, Conjunctiva clear  Oropharynx clear, mucosa moist.  Bottom lip with swelling. nasal mucosa normal  NECK: without thyromegaly, lymphadenopathy, JVD  LUNGS:Clear to ascultation bilaterally. Breathing comfortably  CARDIOVASCULAR:  Regular rate and rhythm, no murmurs, rubs, or gallops  EXTREMITY: Full range of motion. No clubbing/cyanosis/edema  NEURO: Cranial nerves II-XII grossly intact. Strength 5/5, DTR 2/4. SKIN: Warm, Dry, No rash. PSYCH: Mood and Affect normal.    A:    Diagnosis Orders   1. Allergic reaction, initial encounter  hydrOXYzine (ATARAX) 50 MG tablet    External Referral To Allergy     P: Send to allergy for allergy testing. If develop difficulty swallowing or trouble breathing patient go right to emergency room.

## 2019-07-16 NOTE — PATIENT INSTRUCTIONS
this instruction, always ask your healthcare professional. [place] [address] any warranty or liability for your use of this information.

## 2019-07-23 ENCOUNTER — OFFICE VISIT (OUTPATIENT)
Dept: FAMILY MEDICINE CLINIC | Age: 59
End: 2019-07-23
Payer: MEDICAID

## 2019-07-23 VITALS
SYSTOLIC BLOOD PRESSURE: 108 MMHG | DIASTOLIC BLOOD PRESSURE: 68 MMHG | TEMPERATURE: 98.2 F | OXYGEN SATURATION: 98 % | WEIGHT: 174.4 LBS | BODY MASS INDEX: 29.94 KG/M2 | HEART RATE: 103 BPM

## 2019-07-23 DIAGNOSIS — J06.9 VIRAL URI: Primary | ICD-10-CM

## 2019-07-23 LAB
INFLUENZA VIRUS A RNA: NEGATIVE
INFLUENZA VIRUS B RNA: NEGATIVE

## 2019-07-23 PROCEDURE — G8417 CALC BMI ABV UP PARAM F/U: HCPCS | Performed by: FAMILY MEDICINE

## 2019-07-23 PROCEDURE — 1036F TOBACCO NON-USER: CPT | Performed by: FAMILY MEDICINE

## 2019-07-23 PROCEDURE — G8598 ASA/ANTIPLAT THER USED: HCPCS | Performed by: FAMILY MEDICINE

## 2019-07-23 PROCEDURE — 87502 INFLUENZA DNA AMP PROBE: CPT | Performed by: FAMILY MEDICINE

## 2019-07-23 PROCEDURE — G8428 CUR MEDS NOT DOCUMENT: HCPCS | Performed by: FAMILY MEDICINE

## 2019-07-23 PROCEDURE — 3017F COLORECTAL CA SCREEN DOC REV: CPT | Performed by: FAMILY MEDICINE

## 2019-07-23 PROCEDURE — 99213 OFFICE O/P EST LOW 20 MIN: CPT | Performed by: FAMILY MEDICINE

## 2019-07-23 RX ORDER — BENZONATATE 200 MG/1
200 CAPSULE ORAL 3 TIMES DAILY PRN
Qty: 30 CAPSULE | Refills: 0 | Status: SHIPPED | OUTPATIENT
Start: 2019-07-23 | End: 2019-10-02

## 2019-07-23 NOTE — PROGRESS NOTES
12.5 MG tablet Take 1 tablet by mouth 2 times daily (with meals) 60 tablet 11    potassium chloride (KLOR-CON M) 20 MEQ extended release tablet take 1 tablet by mouth once daily 30 tablet 5    nicotine (NICODERM CQ) 21 MG/24HR Place 1 patch onto the skin daily 45 patch 0    nicotine (NICODERM CQ) 14 MG/24HR Place 1 patch onto the skin daily for 14 days 14 patch 1    nicotine (NICODERM CQ) 7 MG/24HR Place 1 patch onto the skin daily for 14 days 14 patch 1    midodrine (PROAMATINE) 5 MG tablet Take 1 tablet by mouth 3 times daily 270 tablet 3    fluticasone (FLONASE) 50 MCG/ACT nasal spray instill 2 sprays into each nostril once daily 16 g 5    oxybutynin (DITROPAN) 5 MG tablet take 1 tablet by mouth three times a day 90 tablet 5    VORTIoxetine (TRINTELLIX) 10 MG TABS tablet Take 10 mg by mouth daily      nicotine (NICODERM CQ) 14 MG/24HR Place 1 patch onto the skin every 24 hours 30 patch 0    LORazepam (ATIVAN) 0.5 MG tablet take 1 tablet by mouth every 4 hours MUST LAST 30 DAYS  0    venlafaxine (EFFEXOR XR) 75 MG extended release capsule take 1 capsule by mouth every morning INCREASE TO 2 CAPSULES EVERY MORNING IN 2 WEEKS  0    donepezil (ARICEPT) 10 MG tablet Take 20 mg by mouth nightly      busPIRone (BUSPAR) 15 MG tablet Take 1 tablet by mouth 2 times daily 60 tablet 5    aspirin EC 81 MG EC tablet Take 1 tablet by mouth daily 90 tablet 3    amitriptyline (ELAVIL) 100 MG tablet Take 100 mg by mouth every evening  2    NAMZARIC 28-10 MG CP24 Take by mouth nightly   2     No current facility-administered medications for this visit.         Allergies   Allergen Reactions    Other Other (See Comments)     Vicryl sutures, Pt does not heal with Vicryl sutures            Objective:      /68 (Site: Left Upper Arm, Position: Sitting, Cuff Size: Large Adult)   Pulse 103   Temp 98.2 °F (36.8 °C) (Oral)   Wt 174 lb 6.4 oz (79.1 kg)   SpO2 98%   BMI 29.94 kg/m²   General: Alert and oriented, in

## 2019-07-25 ENCOUNTER — TELEPHONE (OUTPATIENT)
Dept: FAMILY MEDICINE CLINIC | Age: 59
End: 2019-07-25

## 2019-07-25 RX ORDER — ONDANSETRON 4 MG/1
4 TABLET, FILM COATED ORAL DAILY PRN
Qty: 10 TABLET | Refills: 0 | Status: SHIPPED | OUTPATIENT
Start: 2019-07-25 | End: 2019-10-02

## 2019-08-16 ENCOUNTER — TELEPHONE (OUTPATIENT)
Dept: CARDIOLOGY CLINIC | Age: 59
End: 2019-08-16

## 2019-08-16 RX ORDER — VALSARTAN 40 MG/1
40 TABLET ORAL DAILY
Qty: 30 TABLET | Refills: 0 | Status: SHIPPED | OUTPATIENT
Start: 2019-08-16 | End: 2019-08-19 | Stop reason: ALTCHOICE

## 2019-08-16 NOTE — TELEPHONE ENCOUNTER
Patient had a issue a few times this year with hands and face swelling , She went to see a Allergist he suggested that maybe we can change her from Lisinopril   Also asking for the starter pack for Nicotine Patches

## 2019-08-19 RX ORDER — LOSARTAN POTASSIUM 25 MG/1
25 TABLET ORAL DAILY
Qty: 30 TABLET | Refills: 0 | Status: SHIPPED | OUTPATIENT
Start: 2019-08-19 | End: 2019-09-05 | Stop reason: SDUPTHER

## 2019-09-04 DIAGNOSIS — I21.4 NSTEMI (NON-ST ELEVATED MYOCARDIAL INFARCTION) (HCC): ICD-10-CM

## 2019-09-04 DIAGNOSIS — I50.32 CHRONIC DIASTOLIC CONGESTIVE HEART FAILURE (HCC): ICD-10-CM

## 2019-09-04 DIAGNOSIS — I47.1 SVT (SUPRAVENTRICULAR TACHYCARDIA) (HCC): ICD-10-CM

## 2019-09-04 DIAGNOSIS — I25.110 CORONARY ARTERY DISEASE INVOLVING NATIVE CORONARY ARTERY OF NATIVE HEART WITH UNSTABLE ANGINA PECTORIS (HCC): ICD-10-CM

## 2019-09-04 DIAGNOSIS — R06.02 SHORTNESS OF BREATH: ICD-10-CM

## 2019-09-04 RX ORDER — FUROSEMIDE 40 MG/1
40 TABLET ORAL 2 TIMES DAILY
Qty: 60 TABLET | Refills: 11 | Status: CANCELLED | OUTPATIENT
Start: 2019-09-04

## 2019-09-04 RX ORDER — MIDODRINE HYDROCHLORIDE 5 MG/1
5 TABLET ORAL 3 TIMES DAILY
Qty: 270 TABLET | Refills: 3 | Status: CANCELLED | OUTPATIENT
Start: 2019-09-04

## 2019-09-04 RX ORDER — CARVEDILOL 12.5 MG/1
12.5 TABLET ORAL 2 TIMES DAILY WITH MEALS
Qty: 60 TABLET | Refills: 11 | Status: CANCELLED | OUTPATIENT
Start: 2019-09-04 | End: 2020-09-03

## 2019-09-05 RX ORDER — LOSARTAN POTASSIUM 25 MG/1
25 TABLET ORAL DAILY
Qty: 30 TABLET | Refills: 0 | Status: SHIPPED | OUTPATIENT
Start: 2019-09-05 | End: 2019-10-30 | Stop reason: SDUPTHER

## 2019-10-02 ENCOUNTER — OFFICE VISIT (OUTPATIENT)
Dept: FAMILY MEDICINE CLINIC | Age: 59
End: 2019-10-02
Payer: MEDICAID

## 2019-10-02 VITALS
HEART RATE: 76 BPM | DIASTOLIC BLOOD PRESSURE: 80 MMHG | OXYGEN SATURATION: 98 % | WEIGHT: 168 LBS | BODY MASS INDEX: 28.84 KG/M2 | TEMPERATURE: 97.4 F | SYSTOLIC BLOOD PRESSURE: 128 MMHG

## 2019-10-02 DIAGNOSIS — E66.09 NON MORBID OBESITY DUE TO EXCESS CALORIES: ICD-10-CM

## 2019-10-02 DIAGNOSIS — J44.9 CHRONIC OBSTRUCTIVE PULMONARY DISEASE, UNSPECIFIED COPD TYPE (HCC): ICD-10-CM

## 2019-10-02 DIAGNOSIS — E03.4 HYPOTHYROIDISM DUE TO ACQUIRED ATROPHY OF THYROID: ICD-10-CM

## 2019-10-02 DIAGNOSIS — N32.81 OAB (OVERACTIVE BLADDER): Primary | ICD-10-CM

## 2019-10-02 DIAGNOSIS — G81.91 RIGHT HEMIPARESIS (HCC): ICD-10-CM

## 2019-10-02 PROCEDURE — 99214 OFFICE O/P EST MOD 30 MIN: CPT | Performed by: FAMILY MEDICINE

## 2019-10-02 PROCEDURE — 3023F SPIROM DOC REV: CPT | Performed by: FAMILY MEDICINE

## 2019-10-02 PROCEDURE — G8482 FLU IMMUNIZE ORDER/ADMIN: HCPCS | Performed by: FAMILY MEDICINE

## 2019-10-02 PROCEDURE — 90471 IMMUNIZATION ADMIN: CPT | Performed by: FAMILY MEDICINE

## 2019-10-02 PROCEDURE — G8926 SPIRO NO PERF OR DOC: HCPCS | Performed by: FAMILY MEDICINE

## 2019-10-02 PROCEDURE — 3017F COLORECTAL CA SCREEN DOC REV: CPT | Performed by: FAMILY MEDICINE

## 2019-10-02 PROCEDURE — G8417 CALC BMI ABV UP PARAM F/U: HCPCS | Performed by: FAMILY MEDICINE

## 2019-10-02 PROCEDURE — G8427 DOCREV CUR MEDS BY ELIG CLIN: HCPCS | Performed by: FAMILY MEDICINE

## 2019-10-02 PROCEDURE — G8598 ASA/ANTIPLAT THER USED: HCPCS | Performed by: FAMILY MEDICINE

## 2019-10-02 PROCEDURE — 90686 IIV4 VACC NO PRSV 0.5 ML IM: CPT | Performed by: FAMILY MEDICINE

## 2019-10-02 PROCEDURE — 1036F TOBACCO NON-USER: CPT | Performed by: FAMILY MEDICINE

## 2019-10-02 RX ORDER — FLUTICASONE PROPIONATE 220 UG/1
AEROSOL, METERED RESPIRATORY (INHALATION)
Qty: 3 INHALER | Refills: 1 | Status: SHIPPED | OUTPATIENT
Start: 2019-10-02 | End: 2020-04-02 | Stop reason: SDUPTHER

## 2019-10-02 RX ORDER — OXYBUTYNIN CHLORIDE 10 MG/1
10 TABLET, EXTENDED RELEASE ORAL DAILY
Qty: 90 TABLET | Refills: 1 | Status: SHIPPED | OUTPATIENT
Start: 2019-10-02 | End: 2020-09-23

## 2019-10-02 RX ORDER — LEVOTHYROXINE SODIUM 0.1 MG/1
TABLET ORAL
Qty: 30 TABLET | Refills: 5 | Status: SHIPPED | OUTPATIENT
Start: 2019-10-02 | End: 2020-04-02 | Stop reason: SDUPTHER

## 2019-10-02 ASSESSMENT — PATIENT HEALTH QUESTIONNAIRE - PHQ9
2. FEELING DOWN, DEPRESSED OR HOPELESS: 0
1. LITTLE INTEREST OR PLEASURE IN DOING THINGS: 0
SUM OF ALL RESPONSES TO PHQ QUESTIONS 1-9: 0
SUM OF ALL RESPONSES TO PHQ QUESTIONS 1-9: 0
SUM OF ALL RESPONSES TO PHQ9 QUESTIONS 1 & 2: 0

## 2019-10-30 ENCOUNTER — OFFICE VISIT (OUTPATIENT)
Dept: CARDIOLOGY CLINIC | Age: 59
End: 2019-10-30
Payer: MEDICAID

## 2019-10-30 VITALS
WEIGHT: 168.2 LBS | HEIGHT: 64 IN | BODY MASS INDEX: 28.71 KG/M2 | HEART RATE: 72 BPM | DIASTOLIC BLOOD PRESSURE: 72 MMHG | SYSTOLIC BLOOD PRESSURE: 118 MMHG

## 2019-10-30 DIAGNOSIS — I47.1 SVT (SUPRAVENTRICULAR TACHYCARDIA) (HCC): ICD-10-CM

## 2019-10-30 DIAGNOSIS — I25.110 CORONARY ARTERY DISEASE INVOLVING NATIVE CORONARY ARTERY OF NATIVE HEART WITH UNSTABLE ANGINA PECTORIS (HCC): ICD-10-CM

## 2019-10-30 DIAGNOSIS — Z98.890 S/P ABLATION OF ACCESSORY BYPASS TRACT: ICD-10-CM

## 2019-10-30 DIAGNOSIS — I50.22 CHRONIC SYSTOLIC CONGESTIVE HEART FAILURE (HCC): Primary | ICD-10-CM

## 2019-10-30 DIAGNOSIS — E78.5 DYSLIPIDEMIA: ICD-10-CM

## 2019-10-30 DIAGNOSIS — R06.02 SHORTNESS OF BREATH: ICD-10-CM

## 2019-10-30 PROCEDURE — 99214 OFFICE O/P EST MOD 30 MIN: CPT | Performed by: NURSE PRACTITIONER

## 2019-10-30 RX ORDER — BUSPIRONE HYDROCHLORIDE 15 MG/1
TABLET ORAL
Refills: 0 | COMMUNITY
Start: 2019-10-13 | End: 2020-09-23

## 2019-10-30 RX ORDER — EPINEPHRINE 0.3 MG/.3ML
INJECTION SUBCUTANEOUS
Refills: 0 | COMMUNITY
Start: 2019-10-03

## 2019-10-30 RX ORDER — CARVEDILOL 12.5 MG/1
12.5 TABLET ORAL 2 TIMES DAILY WITH MEALS
Qty: 60 TABLET | Refills: 5 | Status: SHIPPED | OUTPATIENT
Start: 2019-10-30 | End: 2020-03-05 | Stop reason: SDUPTHER

## 2019-10-30 RX ORDER — CYCLOBENZAPRINE HCL 10 MG
TABLET ORAL
Refills: 0 | COMMUNITY
Start: 2019-10-19 | End: 2020-09-23

## 2019-10-30 RX ORDER — ZONISAMIDE 100 MG/1
CAPSULE ORAL
Refills: 0 | COMMUNITY
Start: 2019-10-13 | End: 2020-09-23

## 2019-10-30 RX ORDER — MIRTAZAPINE 30 MG/1
TABLET, FILM COATED ORAL
Refills: 0 | COMMUNITY
Start: 2019-10-13 | End: 2020-09-23

## 2019-10-30 RX ORDER — LOSARTAN POTASSIUM 25 MG/1
25 TABLET ORAL DAILY
Qty: 30 TABLET | Refills: 5 | Status: SHIPPED | OUTPATIENT
Start: 2019-10-30 | End: 2020-03-05 | Stop reason: SDUPTHER

## 2019-10-30 RX ORDER — FUROSEMIDE 40 MG/1
40 TABLET ORAL 2 TIMES DAILY
Qty: 60 TABLET | Refills: 11 | Status: SHIPPED | OUTPATIENT
Start: 2019-10-30 | End: 2020-04-02 | Stop reason: ALTCHOICE

## 2019-10-30 RX ORDER — MIDODRINE HYDROCHLORIDE 5 MG/1
2.5 TABLET ORAL 3 TIMES DAILY
Qty: 270 TABLET | Refills: 0
Start: 2019-10-30 | End: 2020-03-03

## 2019-12-11 ENCOUNTER — TELEPHONE (OUTPATIENT)
Dept: CARDIOLOGY CLINIC | Age: 59
End: 2019-12-11

## 2020-03-03 ENCOUNTER — OFFICE VISIT (OUTPATIENT)
Dept: CARDIOLOGY CLINIC | Age: 60
End: 2020-03-03
Payer: MEDICAID

## 2020-03-03 VITALS
BODY MASS INDEX: 28.61 KG/M2 | HEART RATE: 76 BPM | WEIGHT: 167.6 LBS | SYSTOLIC BLOOD PRESSURE: 134 MMHG | DIASTOLIC BLOOD PRESSURE: 80 MMHG | HEIGHT: 64 IN

## 2020-03-03 PROCEDURE — G8417 CALC BMI ABV UP PARAM F/U: HCPCS | Performed by: INTERNAL MEDICINE

## 2020-03-03 PROCEDURE — G8482 FLU IMMUNIZE ORDER/ADMIN: HCPCS | Performed by: INTERNAL MEDICINE

## 2020-03-03 PROCEDURE — 99213 OFFICE O/P EST LOW 20 MIN: CPT | Performed by: INTERNAL MEDICINE

## 2020-03-03 PROCEDURE — 4004F PT TOBACCO SCREEN RCVD TLK: CPT | Performed by: INTERNAL MEDICINE

## 2020-03-03 PROCEDURE — G8427 DOCREV CUR MEDS BY ELIG CLIN: HCPCS | Performed by: INTERNAL MEDICINE

## 2020-03-03 PROCEDURE — 3017F COLORECTAL CA SCREEN DOC REV: CPT | Performed by: INTERNAL MEDICINE

## 2020-03-03 NOTE — PATIENT INSTRUCTIONS
CAD:Yes   clinically stable. Patient is on optimal medical regimen ( see medication list above )  -     CORONARY ARTERY DISEASE: Patient is currently  asymptomatic from CAD. - changes in  treatment:   no           - Testing ordered:  no  [facility] classification: 1  [place] RISK SCORE:  [place] RISK SCORE:  HTN: no   CARDIOMYOPATHY:  known   CONGESTIVE HEART FAILURE:  KNOWN HISTORY. Compensated. VHD: No significant VHD noted  DYSLIPIDEMIA: Patient's profile is at / near Goal.yes,                                 HDL is low                                Tolerating current medical regimen wellyes,                                                               See most recent Lab values in Labs section above. OTHER RELEVANT DIAGNOSIS:as noted in patient's active problem list:  TESTS ORDERED: None this visit                                      All previously ordered tests reviewed. ARRHYTHMIAS: known H/O SVT S/P Ablation   MEDICATIONS: CPM except D/C Midodrine. Office f/u in six months. Primary/secondary prevention is the goal by aggressive risk modification, healthy and therapeutic life style changes for cardiovascular risk reduction. Various goals are discussed and multiple questions answered.

## 2020-03-03 NOTE — PROGRESS NOTES
Normal venous duplex examination of the legs bilaterally with normal venous Doppler signals noted throughout. No evidence of thrombophlebitis is noted bilaterally in the deep and superficial veins of the legs. Past Surgical History:   Procedure Laterality Date    BREAST SURGERY Bilateral 1995    fibrocystic breast mass    CARDIAC SURGERY  2016    catherization, stent x1 placed    CARPAL TUNNEL RELEASE Bilateral     2015    COLONOSCOPY  12/13/2017    normal 10 year      As reviewed   Family History   Problem Relation Age of Onset    Stroke Mother     Stroke Father      Social History     Tobacco Use    Smoking status: Current Every Day Smoker     Packs/day: 1.00     Types: Cigarettes     Start date: 4/6/2014    Smokeless tobacco: Never Used   Substance Use Topics    Alcohol use: No     Alcohol/week: 0.0 standard drinks     Comment: hx of alcoholism      Review of Systems:    Constitutional: Negative for diaphoresis and fatigue  Psychological:Negative for anxiety or depression  HENT: Negative for headaches, nasal congestion, sinus pain or vertigo  Eyes: Negative for visual disturbance.    Endocrine: Negative for polydipsia/polyuria  Respiratory: Negative for shortness of breath  Cardiovascular: Negative for chest pain, dyspnea on exertion, claudication, edema, irregular heartbeat, murmur, palpitations or shortness of breath  Gastrointestinal: Negative for abdominal pain or heartburn  Genito-Urinary: Negative for urinary frequency/urgency  Musculoskeletal: Negative for muscle pain, muscular weakness, negative for pain in arm and leg or swelling in foot and leg  Neurological: Negative for dizziness, headaches, memory loss, numbness/tingling, visual changes, syncope  Dermatological: Negative for rash    Objective:  /80   Pulse 76   Ht 5' 4\" (1.626 m)   Wt 167 lb 9.6 oz (76 kg)   BMI 28.77 kg/m²   Wt Readings from Last 3 Encounters:   03/03/20 167 lb 9.6 oz (76 kg)   10/30/19 168 lb 3.2 oz (76.3 kg)  Unstable angina (HCC) I20.0    NSTEMI (non-ST elevated myocardial infarction) (HCC) I21.4    Chronic obstructive pulmonary disease (HCC) J44.9    Non morbid obesity due to excess calories E66.09    LV (left ventricular) mural thrombus I51.3    CAD (coronary artery disease) I25.10    Dyslipidemia E78.5    Pseudobulbar affect F48.2    [facility] I47.1    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 1XXA    Right hemiparesis ([address] G81.91       Assessment & Plan:    CAD:Yes   clinically stable. Patient is on optimal medical regimen ( see medication list above )  -     CORONARY ARTERY DISEASE: Patient is currently  asymptomatic from CAD. - changes in  treatment:   no           - Testing ordered:  no  [facility] classification: 1  [place] RISK SCORE:  [place]ET[place] RISK SCORE:  HTN: no   CARDIOMYOPATHY:  known   CONGESTIVE HEART FAILURE:  KNOWN HISTORY. Compensated. VHD: No significant VHD noted  DYSLIPIDEMIA: Patient's profile is at / near Goal.yes,                                 HDL is low                                Tolerating current medical regimen wellyes,                                                               See most recent Lab values in Labs section above. OTHER RELEVANT DIAGNOSIS:as noted in patient's active problem list:  TESTS ORDERED: None this visit                                      All previously ordered tests reviewed. ARRHYTHMIAS: known H/O SVT S/P Ablation   MEDICATIONS: CPM except D/C Midodrine. Office f/u in six months. Primary/secondary prevention is the goal by aggressive risk modification, healthy and therapeutic life style changes for cardiovascular risk reduction. Various goals are discussed and multiple questions answered.

## 2020-03-05 RX ORDER — NICOTINE 21 MG/24HR
1 PATCH, TRANSDERMAL 24 HOURS TRANSDERMAL DAILY
Qty: 30 PATCH | Refills: 0 | Status: SHIPPED | OUTPATIENT
Start: 2020-03-05 | End: 2020-09-23

## 2020-03-05 RX ORDER — CARVEDILOL 12.5 MG/1
12.5 TABLET ORAL 2 TIMES DAILY WITH MEALS
Qty: 60 TABLET | Refills: 5 | Status: SHIPPED | OUTPATIENT
Start: 2020-03-05 | End: 2020-10-02 | Stop reason: SDUPTHER

## 2020-03-05 RX ORDER — LOSARTAN POTASSIUM 25 MG/1
25 TABLET ORAL DAILY
Qty: 30 TABLET | Refills: 5 | Status: SHIPPED | OUTPATIENT
Start: 2020-03-05 | End: 2020-08-25

## 2020-03-05 RX ORDER — NICOTINE 21 MG/24HR
1 PATCH, TRANSDERMAL 24 HOURS TRANSDERMAL DAILY
Qty: 14 PATCH | Refills: 0 | Status: SHIPPED | OUTPATIENT
Start: 2020-03-05 | End: 2020-09-23

## 2020-03-20 RX ORDER — ATORVASTATIN CALCIUM 40 MG/1
40 TABLET, FILM COATED ORAL DAILY
Qty: 30 TABLET | Refills: 5 | Status: SHIPPED | OUTPATIENT
Start: 2020-03-20 | End: 2020-09-22

## 2020-04-02 ENCOUNTER — TELEMEDICINE (OUTPATIENT)
Dept: FAMILY MEDICINE CLINIC | Age: 60
End: 2020-04-02
Payer: MEDICAID

## 2020-04-02 PROCEDURE — G8417 CALC BMI ABV UP PARAM F/U: HCPCS | Performed by: FAMILY MEDICINE

## 2020-04-02 PROCEDURE — G8428 CUR MEDS NOT DOCUMENT: HCPCS | Performed by: FAMILY MEDICINE

## 2020-04-02 PROCEDURE — G8926 SPIRO NO PERF OR DOC: HCPCS | Performed by: FAMILY MEDICINE

## 2020-04-02 PROCEDURE — 3023F SPIROM DOC REV: CPT | Performed by: FAMILY MEDICINE

## 2020-04-02 PROCEDURE — 4004F PT TOBACCO SCREEN RCVD TLK: CPT | Performed by: FAMILY MEDICINE

## 2020-04-02 PROCEDURE — 3017F COLORECTAL CA SCREEN DOC REV: CPT | Performed by: FAMILY MEDICINE

## 2020-04-02 PROCEDURE — 99214 OFFICE O/P EST MOD 30 MIN: CPT | Performed by: FAMILY MEDICINE

## 2020-04-02 RX ORDER — FLUTICASONE PROPIONATE 220 UG/1
AEROSOL, METERED RESPIRATORY (INHALATION)
Qty: 3 INHALER | Refills: 1 | Status: SHIPPED | OUTPATIENT
Start: 2020-04-02 | End: 2020-09-23

## 2020-04-02 RX ORDER — LEVOTHYROXINE SODIUM 0.1 MG/1
TABLET ORAL
Qty: 30 TABLET | Refills: 5 | Status: SHIPPED | OUTPATIENT
Start: 2020-04-02 | End: 2020-10-01 | Stop reason: SDUPTHER

## 2020-04-02 ASSESSMENT — ENCOUNTER SYMPTOMS
RESPIRATORY NEGATIVE: 1
GASTROINTESTINAL NEGATIVE: 1
EYES NEGATIVE: 1

## 2020-04-02 ASSESSMENT — PATIENT HEALTH QUESTIONNAIRE - PHQ9
1. LITTLE INTEREST OR PLEASURE IN DOING THINGS: 0
2. FEELING DOWN, DEPRESSED OR HOPELESS: 0
SUM OF ALL RESPONSES TO PHQ9 QUESTIONS 1 & 2: 0
SUM OF ALL RESPONSES TO PHQ QUESTIONS 1-9: 0
SUM OF ALL RESPONSES TO PHQ QUESTIONS 1-9: 0

## 2020-04-02 NOTE — PROGRESS NOTES
placed    CARPAL TUNNEL RELEASE Bilateral     2015    COLONOSCOPY  12/13/2017    normal 10 year       PHYSICAL EXAMINATION:  [ INSTRUCTIONS:  \"[x]\" Indicates a positive item  \"[]\" Indicates a negative item  -- DELETE ALL ITEMS NOT EXAMINED]  Vital Signs: (As obtained by patient/caregiver or practitioner observation)    Blood pressure- 122/87 Heart rate- 95   Temperature-  Obesity 162     Constitutional: [x] Appears well-developed and well-nourished [x] No apparent distress      [] Abnormal-   Mental status  [x] Alert and awake  [x] Oriented to person/place/time [x]Able to follow commands      Eyes:  EOM    [x]  Normal  [] Abnormal-  Sclera  [x]  Normal  [] Abnormal -         Discharge [x]  None visible  [] Abnormal -    HENT:   [x] Normocephalic, atraumatic. [] Abnormal   [x] Mouth/Throat: Mucous membranes are moist.     External Ears [x] Normal  [] Abnormal-     Neck: [x] No visualized mass     Pulmonary/Chest: [x] Respiratory effort normal.  [x] No visualized signs of difficulty breathing or respiratory distress        [] Abnormal-      Musculoskeletal:   [x] Normal gait with no signs of ataxia         [x] Normal range of motion of neck        [] Abnormal-       Neurological:        [x] No Facial Asymmetry (Cranial nerve 7 motor function) (limited exam to video visit)          [x] No gaze palsy        [] Abnormal-         Skin:        [x] No significant exanthematous lesions or discoloration noted on facial skin         [] Abnormal-            Psychiatric:       [x] Normal Affect [] No Hallucinations        [] Abnormal-     Other pertinent observable physical exam findings-     ASSESSMENT/PLAN:   Diagnosis Orders   1. Hypothyroidism due to acquired atrophy of thyroid     Asymptomatic levothyroxine (SYNTHROID) 100 MCG tablet   2. Chronic obstructive pulmonary disease, unspecified COPD type (HCC)     Well-controlled fluticasone (FLOVENT HFA) 220 MCG/ACT inhaler   3. OAB (overactive bladder)   Needs improvement.

## 2020-08-26 RX ORDER — LOSARTAN POTASSIUM 25 MG/1
25 TABLET ORAL DAILY
Qty: 30 TABLET | Refills: 5 | Status: SHIPPED | OUTPATIENT
Start: 2020-08-26 | End: 2021-03-04

## 2020-09-23 ENCOUNTER — OFFICE VISIT (OUTPATIENT)
Dept: CARDIOLOGY CLINIC | Age: 60
End: 2020-09-23
Payer: MEDICAID

## 2020-09-23 VITALS
RESPIRATION RATE: 18 BRPM | BODY MASS INDEX: 29.6 KG/M2 | HEART RATE: 92 BPM | WEIGHT: 173.4 LBS | HEIGHT: 64 IN | DIASTOLIC BLOOD PRESSURE: 76 MMHG | SYSTOLIC BLOOD PRESSURE: 122 MMHG

## 2020-09-23 PROCEDURE — G8417 CALC BMI ABV UP PARAM F/U: HCPCS | Performed by: NURSE PRACTITIONER

## 2020-09-23 PROCEDURE — 3017F COLORECTAL CA SCREEN DOC REV: CPT | Performed by: NURSE PRACTITIONER

## 2020-09-23 PROCEDURE — G8427 DOCREV CUR MEDS BY ELIG CLIN: HCPCS | Performed by: NURSE PRACTITIONER

## 2020-09-23 PROCEDURE — 4004F PT TOBACCO SCREEN RCVD TLK: CPT | Performed by: NURSE PRACTITIONER

## 2020-09-23 PROCEDURE — 99214 OFFICE O/P EST MOD 30 MIN: CPT | Performed by: NURSE PRACTITIONER

## 2020-09-23 RX ORDER — ONDANSETRON 4 MG/1
4 TABLET, ORALLY DISINTEGRATING ORAL 3 TIMES DAILY PRN
Qty: 21 TABLET | Refills: 0 | Status: SHIPPED | OUTPATIENT
Start: 2020-09-23 | End: 2021-04-01 | Stop reason: SDUPTHER

## 2020-09-23 RX ORDER — VARENICLINE TARTRATE
KIT
Qty: 1 BOX | Refills: 0 | Status: SHIPPED | OUTPATIENT
Start: 2020-09-23 | End: 2021-03-25 | Stop reason: SDUPTHER

## 2020-09-23 NOTE — PROGRESS NOTES
daily take 1 tablet by mouth once daily 30 tablet 5    carvedilol (COREG) 12.5 MG tablet Take 1 tablet by mouth 2 times daily (with meals) 60 tablet 5    apixaban (ELIQUIS) 5 MG TABS tablet Take 1 tablet by mouth 2 times daily 60 tablet 5    EPINEPHrine (EPIPEN) 0.3 MG/0.3ML SOAJ injection   0    fluticasone (FLONASE) 50 MCG/ACT nasal spray instill 2 sprays into each nostril once daily 16 g 5    LORazepam (ATIVAN) 0.5 MG tablet every 6 hours as needed. 0    donepezil (ARICEPT) 10 MG tablet Take 20 mg by mouth nightly      aspirin EC 81 MG EC tablet Take 1 tablet by mouth daily 90 tablet 3    amitriptyline (ELAVIL) 100 MG tablet Take 100 mg by mouth every evening  2    NAMZARIC 28-10 MG CP24 Take by mouth nightly   2     No current facility-administered medications for this visit. Allergies: Lisinopril and Other  Past Medical History:   Diagnosis Date    Abnormal nuclear stress test     [facility]     Arterial ischemic stroke, MCA (middle cerebral artery), left, acute ([facility] [address]     CAD (coronary artery disease)     CHF (congestive heart failure) ([facility])     COPD (chronic obstructive pulmonary disease) ([facility])     CVA (cerebral vascular accident) ([facility] [address] 4/6/2014    H/O cardiovascular stress test 5/19/2016    treadmill    History of left heart catheterization 4/17/2016    Severe 2 vessel disease, but 3 vessel disease. 2.5x30 Resolute Stent placed to the LAD. Successful angio-seal deployment w/ excellent results.  Hx of cardiovascular stress test 6/22/2015    lexiscan-scar,EF47%    Hx of echocardiogram 4/8/2014    VALDEMAR: EF 40-45%. Right and left atrium/ventricles are normal. Mitral/Tricuspid valves normal. Mild aortic va;lve stenosis.  Hx of echocardiogram 4/18/2016    EF 35-40%. Borderline LA enlargement. 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 Mild MR/TR with trace aortic insufficiency. Mild pulmonary HTN.      Objective:      Physical Exam:  /76 (Site: Left Upper Arm, Position: Sitting, Cuff Size: Medium Adult)   Pulse 92   Resp 18   Ht 5' 4\" (1.626 m)   Wt 173 lb 6.4 oz (78.7 kg)   BMI 29.76 kg/m²   Wt Readings from Last 3 Encounters:   09/23/20 173 lb 6.4 oz (78.7 kg)   03/03/20 167 lb 9.6 oz (76 kg)   10/30/19 168 lb 3.2 oz (76.3 kg)     Body mass index is 29.76 kg/m². Physical Exam  Vitals signs reviewed. Constitutional:       General: She is not in acute distress. Appearance: Normal appearance. She is not ill-appearing. HENT:      Head: Normocephalic and atraumatic. Eyes:      Conjunctiva/sclera: Conjunctivae normal.      Pupils: Pupils are equal, round, and reactive to light. Neck:      Musculoskeletal: Neck supple. No muscular tenderness. Vascular: No carotid bruit. Cardiovascular:      Rate and Rhythm: Normal rate and regular rhythm. Pulses: Normal pulses. Heart sounds: Normal heart sounds. No murmur. Pulmonary:      Effort: Pulmonary effort is normal. No respiratory distress. Breath sounds: Normal breath sounds. Musculoskeletal:         General: No swelling or deformity. Skin:     General: Skin is warm and dry. Capillary Refill: Capillary refill takes less than 2 seconds. Neurological:      Mental Status: She is alert and oriented to person, place, and time. Psychiatric:         Mood and Affect: Mood normal.         Behavior: Behavior normal.         Thought Content:  Thought content normal.         Judgment: Judgment normal.         DATA:  No results found for: CKTOTAL, CKMB, CKMBINDEX, TROPONINI  BNP:    Lab Results   Component Value Date     06/09/2015     PT/INR:  No results found for: PTINR  Lab Results   Component Value Date    LABA1C 5.0 10/05/2017    LABA1C 5.2 04/18/2016     Lab Results   Component Value Date    CHOL 167 10/15/2018    TRIG 145 10/15/2018    HDL 48 10/15/2018    LDLCALC 90 10/15/2018    LDLDIRECT 104 (H) 07/14/2015 Lab Results   Component Value Date    ALT 15 10/15/2018    AST 21 10/15/2018     TSH:    Lab Results   Component Value Date    TSH 0.535 04/02/2019         Assessment/ Plan:     Tobacco use disorder   Patient is using tobacco at this time   Encouraged to stop  [employer], medicinal, and social support   Patient is  ready to quit at this time, will start chantix to assist with stopping smoking. Will give a few doses of zofran to assist with initial nausea patient experienced previously. Hypertension, essential, benign   Controlled   To continue Beta blocker, ARB   advised low salt diet    Last echo 2018 Summary    This is a limited echo.    Left ventricular systolic function is diminished    Ejection fraction is visually estimated at 40%.    No evidence of LV thrombus. CAD (coronary artery disease)   Patient had stents placed 2016   Patient is not having cardiac symptoms   continue BB,  ASA   Low salt, Low cholesterol diet   Last [facility] 2016      Hyperlipemia   Lipid panel not able to be reviewed   Lipid panel ordered    Goal is not able to be determined   Patient is on a Statin   Discussed with patient the importance of exercise, low cholesterol diet and medication adherence       Patient seen, interviewed and examined. Testing was reviewed. Patient is encouraged to exercise even a brisk walk for 30 minutes at least 3 to 4 times a week. Lifestyle and risk factor modificatons discussed. Various goals are discussed and questions answered. Continue current medications. Appropriate prescriptions are addressed. Questions answered and patient verbalizes understanding. Call for any problems, questions, or concerns.     Pt is to follow up in 6 months for Cardiac management    Electronically signed by IBAN Narayan CNP on 9/23/2020 at 2:31 PM

## 2020-09-23 NOTE — ASSESSMENT & PLAN NOTE
 Patient is using tobacco at this time   Encouraged to stop  [employer], medicinal, and social support   Patient is  ready to quit at this time, will start chantix to assist with stopping smoking. Will give a few doses of zofran to assist with initial nausea patient experienced previously.

## 2020-09-23 NOTE — PROGRESS NOTES
Bryan Torres  1960  [id number]    Have you had any Chest Pain - No    Have you had any Shortness of Breath - No  If Yes - When on exertion    Have you had any dizziness - No    Have you had any palpitations - No    Do you have any edema - swelling in denies      Ask patient if they want to sign up for MyChart if they are not already signed up    Check to see if we have an E-MAIL on file for the patient    Check medication list thoroughly!!!  BE SURE TO ASK PATIENT IF THEY NEED MEDICATION REFILLS

## 2020-09-23 NOTE — ASSESSMENT & PLAN NOTE
 Controlled   To continue Beta blocker, ARB   advised low salt diet    Last echo 2018 Summary    This is a limited echo.    Left ventricular systolic function is diminished    Ejection fraction is visually estimated at 40%.    No evidence of LV thrombus.

## 2020-09-24 RX ORDER — ATORVASTATIN CALCIUM 40 MG/1
40 TABLET, FILM COATED ORAL DAILY
Qty: 30 TABLET | Refills: 5 | Status: SHIPPED | OUTPATIENT
Start: 2020-09-24 | End: 2021-04-02

## 2020-09-28 ASSESSMENT — ENCOUNTER SYMPTOMS
ABDOMINAL PAIN: 0
VOMITING: 0
NAUSEA: 0
SHORTNESS OF BREATH: 0
SINUS PAIN: 0
COLOR CHANGE: 0
COUGH: 0
CONSTIPATION: 0
DIARRHEA: 0
PHOTOPHOBIA: 0
BLOOD IN STOOL: 0

## 2020-09-28 NOTE — ASSESSMENT & PLAN NOTE
 Lipid panel not able to be reviewed   Lipid panel ordered    Goal is not able to be determined   Patient is on a Statin   Discussed with patient the importance of exercise, low cholesterol diet and medication adherence

## 2020-10-01 ENCOUNTER — OFFICE VISIT (OUTPATIENT)
Dept: FAMILY MEDICINE CLINIC | Age: 60
End: 2020-10-01
Payer: MEDICAID

## 2020-10-01 VITALS
SYSTOLIC BLOOD PRESSURE: 128 MMHG | DIASTOLIC BLOOD PRESSURE: 72 MMHG | WEIGHT: 171.2 LBS | TEMPERATURE: 97.2 F | BODY MASS INDEX: 29.39 KG/M2 | HEART RATE: 64 BPM | OXYGEN SATURATION: 97 %

## 2020-10-01 PROCEDURE — G8427 DOCREV CUR MEDS BY ELIG CLIN: HCPCS | Performed by: FAMILY MEDICINE

## 2020-10-01 PROCEDURE — 3017F COLORECTAL CA SCREEN DOC REV: CPT | Performed by: FAMILY MEDICINE

## 2020-10-01 PROCEDURE — 36415 COLL VENOUS BLD VENIPUNCTURE: CPT | Performed by: FAMILY MEDICINE

## 2020-10-01 PROCEDURE — G8482 FLU IMMUNIZE ORDER/ADMIN: HCPCS | Performed by: FAMILY MEDICINE

## 2020-10-01 PROCEDURE — 3023F SPIROM DOC REV: CPT | Performed by: FAMILY MEDICINE

## 2020-10-01 PROCEDURE — G8417 CALC BMI ABV UP PARAM F/U: HCPCS | Performed by: FAMILY MEDICINE

## 2020-10-01 PROCEDURE — G8926 SPIRO NO PERF OR DOC: HCPCS | Performed by: FAMILY MEDICINE

## 2020-10-01 PROCEDURE — 90471 IMMUNIZATION ADMIN: CPT | Performed by: FAMILY MEDICINE

## 2020-10-01 PROCEDURE — 4004F PT TOBACCO SCREEN RCVD TLK: CPT | Performed by: FAMILY MEDICINE

## 2020-10-01 PROCEDURE — 99214 OFFICE O/P EST MOD 30 MIN: CPT | Performed by: FAMILY MEDICINE

## 2020-10-01 PROCEDURE — 90686 IIV4 VACC NO PRSV 0.5 ML IM: CPT | Performed by: FAMILY MEDICINE

## 2020-10-01 RX ORDER — LEVOTHYROXINE SODIUM 0.1 MG/1
TABLET ORAL
Qty: 30 TABLET | Refills: 5 | Status: SHIPPED | OUTPATIENT
Start: 2020-10-01 | End: 2020-10-02 | Stop reason: SDUPTHER

## 2020-10-01 NOTE — PATIENT INSTRUCTIONS
Patient Education        Learning About Saving Energy When You Have a Chronic Condition  Introduction    Everyday tasks can be tiring when you have COPD, heart failure, or another long-term (chronic) condition. You may feel at times that you've lost your ability to live your life. But learning to conserve, or save, your energy can help you be less tired. Conserving your energy means finding ways of doing daily activities with as little effort as possible. With some small changes in the way you do things, you can get your tasks done more easily. Some treatments are available that might help. Pulmonary rehabilitation can teach you ways to breathe easier. Cardiac rehabilitation can help make your heart stronger. You also may want to see an occupational or physical therapist. The therapist can give you more tips on building strength and moving with less effort. What can you do to conserve your energy? Planning  · Make a list of what you have to do every day. Group the tasks by location. · Do all the chores in one part of your house around the same time. · Go out for errands or do chores at the time of day when you have the most energy. · Plan rest periods into your day. Getting things done  · Sit down as often as you can when you get dressed, do chores, or cook. · Use a cart with wheels to roll items, such as laundry, from one room to another. · Push or slide boxes or other large items instead of lifting them. Reaching and bending  · Put things you use the most on shelves that are at the level of your waist or shoulder. · Use long-handled grabbers or other tools to reach items on a high shelf or to  things off the floor. Use long-handled dusters when you clean the house. · Use a raised toilet seat to avoid bending too far to sit or stand up. Eating  · Eat several small meals instead of three larger meals. · If you get too tired to eat much, try to choose healthy foods that have more calories. Have a yogurt-and-fruit smoothie for breakfast. Put avocado on a sandwich. Or add cheese or peanut butter to snacks. · If you don't feel very hungry, try to eat first and drink water or other fluids later, after a meal. This can help keep you from losing weight. Sip small amounts of fluids if you need to drink while you eat. Having sex  · Choose the time of day when you have more energy. · A offy-op-baul position for sex can be less tiring. Sometimes you may want to focus more on caressing. Watch closely for changes in your health, and be sure to contact your doctor if you have any problems. Where can you learn more? Go to https://Dream[employer] BluepeJoshfireeb.GetBack. org and sign in to your [employer] account. Enter H190 in the HubHub box to learn more about \"Learning About Saving Energy When You Have a Chronic Condition. \"     If you do not have an account, please click on the \"Sign Up Now\" link. Current as of: February 24, 2020               Content Version: 12.5  © 2170-6142 Healthwise, Incorporated. Care instructions adapted under license by [facility] ([facility]). If you have questions about a medical condition or this instruction, always ask your healthcare professional. [address] any warranty or liability for your use of this information.

## 2020-10-01 NOTE — PROGRESS NOTES
Vaccine Information Sheet, \"Influenza - Inactivated\"  given to Latesha Newberry, or parent/legal guardian of  Latesha Newberry and verbalized understanding. Patient responses:    Have you ever had a reaction to a flu vaccine? No  Do you have any current illness? No  Have you ever had Guillian Marion Syndrome? No  Do you have a serious allergy to any of the follow: Neomycin, Polymyxin, Thimerosal, eggs or egg products? No    Flu vaccine given per order. Please see immunization tab. Risks and benefits explained. Current VIS given.       Immunizations Administered     Name Date Dose Route    Influenza, Quadv, IM, PF (6 mo and older Fluzone, Flulaval, Fluarix, and 3 yrs and older Afluria) 10/1/2020 0.5 mL Intramuscular    Site: Deltoid- Right    Lot: [id number]    NDC: 03529-431-17

## 2020-10-01 NOTE — PROGRESS NOTES
SUBJECTIVE: Mis Scanlon is a 64 y.o. female here for follow up of hypothyroidism. Scheduled Meds: 100 mcg daily      Lab Results   Component Value Date    TSH 0.535 04/02/2019     Thyroid ROS: denies fatigue, weight changes, heat/cold intolerance, bowel/skin changes or CVS symptoms. Hypertension and hyperlipidemia. She indicates that she is feeling well and denies any symptoms referable to her elevated blood pressure. Specifically denies chest pain, palpitations, dyspnea, orthopnea, PND or peripheral edema. No anorexia, arthralgia, or leg cramps noted. Current medication regimen is as listed below. She denies any side effects of medication, and has been taking it regularly. Cardiology prescribes HTN medications    Depression: Patient complains of depression with anxiety. She complains of depressed mood, insomnia and psychomotor agitation. Onset was approximately several years ago, controlled since that time. She denies current suicidal and homicidal plan or intent. Family history significant for no psychiatric illness. Possible organic causes contributing are: none. Risk factors: previous episode of depression Previous treatment includes Lexapro, Risperdal, and prn ativan ( about 3 times per month). She complains of the following side effects from the treatment: none. She is managed by psychiatry now. COPD: Patient complains of dyspnea. Symptoms began several years ago. Symptoms chronic dyspnea does worsen with exertion. Sputum is clear  in small amounts. Fever has been absent. Patient uses 1 pillows at night. Patient can walk 200 feet before resting. Patient currently is not on home oxygen therapy. Mar Goldman Respiratory history: COPD     Urinary Incontinence: Patient complains of urinary incontinence. This has been present for several years. She leaks urine with with urge. Patient describes the symptoms as  urge to urinate with little or no warning.   Factors associated with symptoms include worse since CVA. Evaluation to date includes none. Treatment to date includes oxybutinin, which was effective. Insurance would not cover Mirabegron which worked better. Patient with increased foot pain her right foot. Patient had hallux valgus surgery 5 years ago but now her great toe is deviated laterally overlying the adjacent 2 toes. ROS: No TIA's or dysphagia. No prolonged cough. No dyspnea or chest pain on exertion. No abdominal pain, change in bowel habits, black or bloody stools. No urinary tract symptoms. She is post menopausal. No hot flashes, abnormal vaginal bleeding, discharge or unexpected pelvic pain. No new breast lumps, breast pain or nipple discharge. Past Medical History:   Diagnosis Date    Abnormal nuclear stress test     ACS [facility]acute coronary syndrome) [facility])     Arterial ischemic stroke, MCA (middle cerebral artery), left, acute ([facility]     CAD (coronary artery disease)     CHF (congestive heart failure) ([facility])     COPD (chronic obstructive pulmonary disease) ([facility])     CVA (cerebral vascular accident) ([facility] 4/6/2014    H/O cardiovascular stress test 5/19/2016    treadmill    History of left heart catheterization 4/17/2016    Severe 2 vessel disease, but 3 vessel disease. 2.5x30 Resolute Stent placed to the LAD. Successful angio-seal deployment w/ excellent results.  Hx of cardiovascular stress test 6/22/2015    lexiscan-scar,EF47%    Hx of echocardiogram 4/8/2014    VALDEMAR: EF 40-45%. Right and left atrium/ventricles are normal. Mitral/Tricuspid valves normal. Mild aortic va;lve stenosis.  Hx of echocardiogram 4/18/2016    EF 35-40%. Borderline LA enlargement. 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 Mild MR/TR with trace aortic insufficiency. Mild pulmonary HTN.      Hyperlipemia     Hypertension, essential, benign     Nicotine dependence     quit 2014    NSTEMI (non-ST elevated myocardial infarction) ([address] 4/17/2015    Post PTCA     Thyroid disease     Unstable angina (HCC)     Venous doppler 10/23/2017    Normal venous duplex examination of the legs bilaterally with normal venous Doppler signals noted throughout. No evidence of thrombophlebitis is noted bilaterally in the deep and superficial veins of the legs. Past Surgical History:   Procedure Laterality Date    BREAST SURGERY Bilateral 1995    fibrocystic breast mass    CARDIAC SURGERY  2016    catherization, stent x1 placed    CARPAL TUNNEL RELEASE Bilateral     2015    COLONOSCOPY  12/13/2017    normal 10 year          Current Outpatient Medications   Medication Sig Dispense Refill    atorvastatin (LIPITOR) 40 MG tablet Take 1 tablet by mouth daily 30 tablet 5    varenicline (CHANTIX STARTING MONTH PAK) 0.5 MG X 11 & 1 MG X 42 tablet Take by mouth. 1 box 0    ondansetron (ZOFRAN-ODT) 4 MG disintegrating tablet Take 1 tablet by mouth 3 times daily as needed for Nausea or Vomiting 21 tablet 0    losartan (COZAAR) 25 MG tablet Take 1 tablet by mouth daily 30 tablet 5    metFORMIN (GLUCOPHAGE) 500 MG tablet take 1 tablet by mouth twice a day 60 tablet 5    levothyroxine (SYNTHROID) 100 MCG tablet take 1 tablet by mouth once daily 30 tablet 5    mirabegron (MYRBETRIQ) 25 MG TB24 Take 1 tablet by mouth daily 90 tablet 1    carvedilol (COREG) 12.5 MG tablet Take 1 tablet by mouth 2 times daily (with meals) 60 tablet 5    apixaban (ELIQUIS) 5 MG TABS tablet Take 1 tablet by mouth 2 times daily 60 tablet 5    EPINEPHrine (EPIPEN) 0.3 MG/0.3ML SOAJ injection   0    fluticasone (FLONASE) 50 MCG/ACT nasal spray instill 2 sprays into each nostril once daily 16 g 5    LORazepam (ATIVAN) 0.5 MG tablet every 6 hours as needed.    0    donepezil (ARICEPT) 10 MG tablet Take 20 mg by mouth nightly      aspirin EC 81 MG EC tablet Take 1 tablet by mouth daily 90 tablet 3    amitriptyline (ELAVIL) 100 MG tablet Take 100 mg by mouth every evening  2    NAMZARIC 28-10 MG CP24 Take by mouth nightly   2     No current facility-administered medications for this visit. Allergies   Allergen Reactions    Lisinopril      angioedema    Other Other (See Comments)     Vicryl sutures, Pt does not heal with Vicryl sutures         Social History     Tobacco Use    Smoking status: Current Every Day Smoker     Packs/day: 0.75     Types: Cigarettes     Start date: 4/6/2014    Smokeless tobacco: Never Used   Substance Use Topics    Alcohol use: No     Alcohol/week: 0.0 standard drinks     Comment: hx of alcoholism          Objective:      /72 (Site: Left Upper Arm, Position: Sitting, Cuff Size: Large Adult)   Pulse 64   Temp 97.2 °F (36.2 °C) (Infrared)   Wt 171 lb 3.2 oz (77.7 kg)   SpO2 97%   BMI 29.39 kg/m²   General: Alert and oriented, in no distress   S1 and S2 normal, no murmurs, clicks, gallops or rubs. Regular rate and rhythm. Chest is clear; no wheezes or rales. No edema or JVD. Right lower leg with a 2 cm hematoma anterior tibia. Mildly tender. No surrounding erythema or ecchymosis. Assessment:       Diagnosis Orders   1. Hypothyroidism due to acquired atrophy of thyroid  TSH without Reflex    levothyroxine (SYNTHROID) 100 MCG tablet   2. Breast cancer screening by mammogram  DAYAN DIGITAL SCREEN W OR WO CAD BILATERAL   3. Right hemiparesis ([address]     4. Asymptomatic menopause  DEXA BONE DENSITY AXIAL SKELETON   5. Non morbid obesity due to excess calories  metFORMIN (GLUCOPHAGE) 500 MG tablet   6. Urinary frequency  mirabegron (MYRBETRIQ) 25 MG TB24   7. OAB (overactive bladder)  mirabegron (MYRBETRIQ) 25 MG TB24   8. Screening, anemia, deficiency, iron  CBC   9. Chronic obstructive pulmonary disease, unspecified COPD type ([address]     10. Dyslipidemia  Comprehensive Metabolic Panel    Lipid Panel   11.  Hallux valgus (acquired), right foot  Amb External Referral To Podiatry          Plan:     Consult sent to podiatry    1)  Medication: continue current medication

## 2020-10-02 LAB
ALBUMIN/GLOBULIN RATIO: 1.8 RATIO (ref 0.8–2.6)
ALBUMIN: 4.6 G/DL (ref 3.5–5.2)
ALP BLD-CCNC: 150 U/L (ref 23–144)
ALT SERPL-CCNC: 8 U/L (ref 0–60)
AST SERPL-CCNC: 11 U/L (ref 0–55)
BILIRUB SERPL-MCNC: 0.2 MG/DL (ref 0–1.2)
BUN BLDV-MCNC: 7 MG/DL (ref 3–29)
BUN/CREAT BLD: 7 (ref 7–25)
CALCIUM SERPL-MCNC: 9.7 MG/DL (ref 8.5–10.5)
CHLORIDE BLD-SCNC: 102 MEQ/L (ref 96–110)
CHOLESTEROL: 165 MG/DL
CO2: 28 MEQ/L (ref 19–32)
CREAT SERPL-MCNC: 1 MG/DL (ref 0.5–1.2)
FASTING STATUS: ABNORMAL
GFR AFRICAN AMERICAN: 73 MLS/MIN/1.73M2
GFR NON-AFRICAN AMERICAN: 63 MLS/MIN/1.73M2
GLOBULIN: 2.6 G/DL (ref 1.9–3.6)
GLUCOSE BLD-MCNC: 68 MG/DL (ref 70–99)
HCT VFR BLD CALC: 38 % (ref 35–46)
HDLC SERPL-MCNC: 36 MG/DL
HEMOGLOBIN: 12.9 G/DL (ref 12–15.6)
LDL CHOLESTEROL CALCULATED: 76 MG/DL
MCH RBC QN AUTO: 30.4 PG (ref 27–33)
MCHC RBC AUTO-ENTMCNC: 33.9 G/DL (ref 32–36)
MCV RBC AUTO: 89.7 FL (ref 80–100)
PDW BLD-RTO: 13.8 % (ref 9–15)
PLATELET # BLD: 266 K/MM3 (ref 130–400)
POTASSIUM SERPL-SCNC: 4.6 MEQ/L (ref 3.4–5.3)
RBC # BLD: 4.24 M/MM3 (ref 3.9–5.2)
SODIUM BLD-SCNC: 140 MEQ/L (ref 135–148)
STATUS: ABNORMAL
TOTAL PROTEIN: 7.2 G/DL (ref 6–8.3)
TRIGL SERPL-MCNC: 264 MG/DL
TSH SERPL DL<=0.05 MIU/L-ACNC: 0.26 MCIU/ML (ref 0.4–4.5)
VLDLC SERPL CALC-MCNC: 53 MG/DL (ref 4–38)
WBC: 5.6 K/MM3 (ref 3.8–10.8)

## 2020-10-02 RX ORDER — LEVOTHYROXINE SODIUM 88 UG/1
TABLET ORAL
Qty: 30 TABLET | Refills: 1 | Status: SHIPPED | OUTPATIENT
Start: 2020-10-02 | End: 2020-12-03 | Stop reason: SDUPTHER

## 2020-10-02 RX ORDER — CARVEDILOL 12.5 MG/1
12.5 TABLET ORAL 2 TIMES DAILY WITH MEALS
Qty: 60 TABLET | Refills: 5 | Status: SHIPPED | OUTPATIENT
Start: 2020-10-02 | End: 2021-04-02

## 2020-10-30 ENCOUNTER — TELEPHONE (OUTPATIENT)
Dept: FAMILY MEDICINE CLINIC | Age: 60
End: 2020-10-30

## 2020-10-30 NOTE — TELEPHONE ENCOUNTER
Patient called requesting a referral to Dr. Karen Mckeon. She stated she has seen him for many years but since she has not seen him in the last year his office is requesting a new referral. She stated she is scheduled to see him on Tuesday 11/03/2020.

## 2020-11-10 ENCOUNTER — HOSPITAL ENCOUNTER (OUTPATIENT)
Dept: WOMENS IMAGING | Age: 60
Discharge: HOME | End: 2020-11-10
Payer: MEDICAID

## 2020-11-10 PROCEDURE — 77067 SCR MAMMO BI INCL CAD: CPT

## 2020-11-10 PROCEDURE — 77080 DXA BONE DENSITY AXIAL: CPT

## 2020-11-10 RX ORDER — FUROSEMIDE 40 MG/1
TABLET ORAL
Qty: 60 TABLET | Refills: 11 | OUTPATIENT
Start: 2020-11-10

## 2020-11-13 ENCOUNTER — TELEPHONE (OUTPATIENT)
Dept: FAMILY MEDICINE CLINIC | Age: 60
End: 2020-11-13

## 2020-11-13 RX ORDER — ANTACID TABLETS 648 MG/1
2 TABLET, CHEWABLE ORAL DAILY
Qty: 60 TABLET | Refills: 5 | Status: SHIPPED | OUTPATIENT
Start: 2020-11-13 | End: 2021-05-12

## 2020-11-13 RX ORDER — OMEGA-3S/DHA/EPA/FISH OIL/D3 300MG-1000
400 CAPSULE ORAL DAILY
Qty: 30 TABLET | Refills: 5 | Status: SHIPPED | OUTPATIENT
Start: 2020-11-13 | End: 2021-10-01 | Stop reason: SDUPTHER

## 2020-11-13 NOTE — TELEPHONE ENCOUNTER
Patient called wanting to know if you could call in prescriptions for the Calcium and Vitamin D. She stated she is on a fixed income. Patient uses [facility] in [facility].

## 2020-11-16 ENCOUNTER — TELEPHONE (OUTPATIENT)
Dept: FAMILY MEDICINE CLINIC | Age: 60
End: 2020-11-16

## 2020-11-16 NOTE — TELEPHONE ENCOUNTER
Patient called state the pharmacy does not have the calcium tabs that was prescribed. Spoke with the pharmacy state they do have the prescription. Patient told them it was the wrong medication. But it is not covered under her insurance patient would have to pay out of pocket. Patient has been notified state she can not afford so she will have to skip it for now.

## 2020-12-01 ENCOUNTER — NURSE ONLY (OUTPATIENT)
Dept: FAMILY MEDICINE CLINIC | Age: 60
End: 2020-12-01

## 2020-12-01 ENCOUNTER — TELEPHONE (OUTPATIENT)
Dept: FAMILY MEDICINE CLINIC | Age: 60
End: 2020-12-01

## 2020-12-01 VITALS — TEMPERATURE: 97.2 F

## 2020-12-02 LAB — TSH SERPL DL<=0.05 MIU/L-ACNC: 1.24 MCIU/ML (ref 0.4–4.5)

## 2020-12-03 RX ORDER — LEVOTHYROXINE SODIUM 88 UG/1
TABLET ORAL
Qty: 30 TABLET | Refills: 3 | Status: SHIPPED | OUTPATIENT
Start: 2020-12-03 | End: 2021-04-01 | Stop reason: SDUPTHER

## 2020-12-09 NOTE — TELEPHONE ENCOUNTER
[facility] faxed referral back they also do not accept patient's insurance. Patient has been notified and advised to contact her insurance to find an in network provider. Patient expressed understanding and will call back with the name of a physician/office. Recommended weight and BMI control through healthy diet and exercise, green leafy veggies, UV protection, and not smoking. Reviewed the benefits of AREDS II VITAMINS VERUS GENOTYPE directed vitamin therapy, and recommended following one or the other to try and prevent the progression of the disease. Reviewed the importance of daily monitoring of the vision in each eye independently, along with the use of the Amsler grid daily and instructed patient to call and return immediately for any new changes in their vision or on the Amsler grid. Patient instructed on the importance of regular follow up and monitoring for the early detection of conversion to wet AMD as early detection results in early treatment and better outcomes.

## 2021-03-03 DIAGNOSIS — I25.110 CORONARY ARTERY DISEASE INVOLVING NATIVE CORONARY ARTERY OF NATIVE HEART WITH UNSTABLE ANGINA PECTORIS (HCC): Primary | ICD-10-CM

## 2021-03-03 DIAGNOSIS — I10 HYPERTENSION, ESSENTIAL, BENIGN: ICD-10-CM

## 2021-03-04 RX ORDER — LOSARTAN POTASSIUM 25 MG/1
25 TABLET ORAL DAILY
Qty: 30 TABLET | Refills: 5 | Status: SHIPPED | OUTPATIENT
Start: 2021-03-04 | End: 2021-08-30

## 2021-03-25 ENCOUNTER — OFFICE VISIT (OUTPATIENT)
Dept: CARDIOLOGY CLINIC | Age: 61
End: 2021-03-25
Payer: MEDICAID

## 2021-03-25 VITALS
SYSTOLIC BLOOD PRESSURE: 130 MMHG | HEIGHT: 64 IN | HEART RATE: 99 BPM | DIASTOLIC BLOOD PRESSURE: 82 MMHG | BODY MASS INDEX: 32.61 KG/M2 | WEIGHT: 191 LBS

## 2021-03-25 DIAGNOSIS — F17.200 TOBACCO USE DISORDER: ICD-10-CM

## 2021-03-25 DIAGNOSIS — E78.5 DYSLIPIDEMIA: ICD-10-CM

## 2021-03-25 DIAGNOSIS — I47.1 SVT (SUPRAVENTRICULAR TACHYCARDIA) (HCC): ICD-10-CM

## 2021-03-25 DIAGNOSIS — Z98.890 S/P ABLATION OF ACCESSORY BYPASS TRACT: ICD-10-CM

## 2021-03-25 DIAGNOSIS — E78.2 MIXED HYPERLIPIDEMIA: ICD-10-CM

## 2021-03-25 DIAGNOSIS — E66.09 CLASS 1 OBESITY DUE TO EXCESS CALORIES WITH SERIOUS COMORBIDITY AND BODY MASS INDEX (BMI) OF 34.0 TO 34.9 IN ADULT: ICD-10-CM

## 2021-03-25 DIAGNOSIS — I50.32 CHRONIC DIASTOLIC CONGESTIVE HEART FAILURE (HCC): ICD-10-CM

## 2021-03-25 DIAGNOSIS — I25.110 CORONARY ARTERY DISEASE INVOLVING NATIVE CORONARY ARTERY OF NATIVE HEART WITH UNSTABLE ANGINA PECTORIS (HCC): Primary | ICD-10-CM

## 2021-03-25 DIAGNOSIS — I21.4 NSTEMI (NON-ST ELEVATED MYOCARDIAL INFARCTION) (HCC): ICD-10-CM

## 2021-03-25 DIAGNOSIS — I10 HYPERTENSION, ESSENTIAL, BENIGN: ICD-10-CM

## 2021-03-25 PROCEDURE — G8427 DOCREV CUR MEDS BY ELIG CLIN: HCPCS | Performed by: INTERNAL MEDICINE

## 2021-03-25 PROCEDURE — 4004F PT TOBACCO SCREEN RCVD TLK: CPT | Performed by: INTERNAL MEDICINE

## 2021-03-25 PROCEDURE — 93000 ELECTROCARDIOGRAM COMPLETE: CPT | Performed by: INTERNAL MEDICINE

## 2021-03-25 PROCEDURE — 99214 OFFICE O/P EST MOD 30 MIN: CPT | Performed by: INTERNAL MEDICINE

## 2021-03-25 PROCEDURE — G8417 CALC BMI ABV UP PARAM F/U: HCPCS | Performed by: INTERNAL MEDICINE

## 2021-03-25 PROCEDURE — 3017F COLORECTAL CA SCREEN DOC REV: CPT | Performed by: INTERNAL MEDICINE

## 2021-03-25 PROCEDURE — G8482 FLU IMMUNIZE ORDER/ADMIN: HCPCS | Performed by: INTERNAL MEDICINE

## 2021-03-25 RX ORDER — VARENICLINE TARTRATE
KIT
Qty: 1 BOX | Refills: 0 | Status: SHIPPED | OUTPATIENT
Start: 2021-03-25 | End: 2021-10-01

## 2021-03-25 NOTE — PATIENT INSTRUCTIONS
CAD:Yes   clinically stable. Patient is on optimal medical regimen ( see medication list above )  - Patient is currently  asymptomatic from CAD. - changes in  treatment:   no           - Testing ordered:  jean paul  [facility] classification: 1  [place] RISK SCORE:  [place] RISK SCORE:  HTN: no   CARDIOMYOPATHY:  known   CONGESTIVE HEART FAILURE:  KNOWN HISTORY. Compensated. VHD: No significant VHD noted  DYSLIPIDEMIA: Patient's profile is at / near Goal.yes,                                 HDL is low                                Tolerating current medical regimen wellyes,                                                               See most recent Lab values in Labs section above. OTHER RELEVANT DIAGNOSIS:as noted in patient's active problem list:  TESTS ORDERED: Echo / Stress Cardiolite. All previously ordered tests reviewed. ARRHYTHMIAS: known H/O SVT S/P Ablation   MEDICATIONS: CPM    Office f/u in six months. Please hold on to these instructions the  will call you within 1-9 business days when we receive authorization from your insurance. Nuclear Stress Test    WHAT TO EXPECT:   ? You will need to confirm the test or it could be cancelled. ? This test will take approximately 2 hours: 1 hour in the AM &    1 hour in the PM. You will be given a time by the Technologist after the first part is completed to come back. ? You will be given a medication, through an IV in the hand, this will safely simulate exercise. This IV is also needed to inject the radioactive isotope unless you are able toe walk the treadmill. ? You will receive an injection in the AM & PM before the pictures. ?  Using a special camera, you will have one set of pictures of your heart taken in the AM and a set of pictures in the PM.     PREPARATION FOR TEST:  ? Eat a light breakfast such as water or juice and toast.  ? If you are DIABETIC: Eat a normal breakfast with NO CAFFEINE and take your insulin as normal.   ? AVOID ALL FOODS & DRINKS containing CAFFEINE 12 HOURS PRIOR TO THE TEST: Including coffee, Tea, Lucia and other soft drinks even those labeled  caffeine free or decaffeinated.  ? HOLD THESE MEDICATIONS Persantine & Theophylline (Theodur)  24 hours prior & bring your inhaler with you.

## 2021-03-25 NOTE — LETTER
[place] [address]  [address] [id number]  Phone: [phone]  Fax: [phone]    Timoteo Mcclain MD        March 25, 2021     Felix Ortiz MD  [address] [address]    Patient: Duane Carrie  MR Number: [id number]  YOB: 1960  Date of Visit: 3/25/2021    Dear Dr. Felix Ortiz:    Thank you for the request for consultation for Juan Stack to me for the evaluation of CAD. Below are the relevant portions of my assessment and plan of care. If you have questions, please do not hesitate to call me. I look forward to following Jay Arnold along with you.     Sincerely,        Timoteo Mcclain MD

## 2021-03-25 NOTE — PROGRESS NOTES
OFFICE PROGRESS NOTES      Gely Ferrara is a 64 y.o. female who has    CHIEF COMPLAINT AS FOLLOWS:  CHEST PAIN: Patient denies any C/O chest pains at this time. SOB:  C/O SOB since last December. LEG EDEMA: C/O leg edema   PALPITATIONS: Denies any C/O Palpitations   DIZZINESS: No C/O Dizziness   SYNCOPE: None   OTHER: Fatigue since after COVID vaccination. HPI: Patient is here for F/U on her CAD, CMP, CHF,  HTN & Dyslipidemia problems. CAD: Patient has known Hx of  CAD. Had angioplasty  in the past.  CMP: Patient has known Hx of ischemic CMP. Being treated with guideline recommended medical / device therapy as stated below. CHF: Patient has known Hx of systolic (HFrEF) . Patient had been managed with medical regimen as stated below. HTN: Patient has known Hx of essential HTN. Has been treated with guideline recommended medical / physical/ diet therapy as stated below. Dyslipidemia: Patient has known Hx of mixed dyslipidemia. Has been treated with guideline recommended medical / physical/ diet therapy as stated below. She does not have any new complaints at this time.     Current Outpatient Medications   Medication Sig Dispense Refill    losartan (COZAAR) 25 MG tablet Take 1 tablet by mouth daily 30 tablet 5    levothyroxine (SYNTHROID) 88 MCG tablet take 1 tablet by mouth once daily 30 tablet 3    vitamin D3 (CHOLECALCIFEROL) 10 MCG (400 UNIT) TABS tablet Take 1 tablet by mouth daily 30 tablet 5    calcium carbonate 648 MG TABS Take 2 tablets by mouth daily 60 tablet 5    apixaban (ELIQUIS) 5 MG TABS tablet Take 1 tablet by mouth 2 times daily 60 tablet 5    carvedilol (COREG) 12.5 MG tablet Take 1 tablet by mouth 2 times daily (with meals) 60 tablet 5    metFORMIN (GLUCOPHAGE) 500 MG tablet take 1 tablet by mouth twice a day 60 tablet 5    mirabegron (MYRBETRIQ) 25 MG TB24 Take 1 tablet by mouth daily 90 tablet 1    atorvastatin (LIPITOR) 40 MG tablet Take 1 tablet by mouth daily 30 tablet 5    varenicline (CHANTIX STARTING MONTH JOHN) 0.5 MG X 11 & 1 MG X 42 tablet Take by mouth. 1 box 0    ondansetron (ZOFRAN-ODT) 4 MG disintegrating tablet Take 1 tablet by mouth 3 times daily as needed for Nausea or Vomiting 21 tablet 0    EPINEPHrine (EPIPEN) 0.3 MG/0.3ML SOAJ injection   0    fluticasone (FLONASE) 50 MCG/ACT nasal spray instill 2 sprays into each nostril once daily 16 g 5    LORazepam (ATIVAN) 0.5 MG tablet every 6 hours as needed. 0    aspirin EC 81 MG EC tablet Take 1 tablet by mouth daily 90 tablet 3    amitriptyline (ELAVIL) 100 MG tablet Take 100 mg by mouth every evening  2    NAMZARIC 28-10 MG CP24 Take by mouth nightly   2    donepezil (ARICEPT) 10 MG tablet Take 20 mg by mouth nightly       No current facility-administered medications for this visit. Allergies: Lisinopril and Other  Review of Systems:    Constitutional: Negative for diaphoresis and fatigue  Respiratory: Negative for shortness of breath  Cardiovascular: Negative for chest pain, dyspnea on exertion, claudication, edema, irregular heartbeat, murmur, palpitations or shortness of breath  Musculoskeletal: Negative for muscle pain, muscular weakness, negative for pain in arm and leg or swelling in foot and leg    Objective:  /82   Pulse 99   Ht 5' 4\" (1.626 m)   Wt 191 lb (86.6 kg)   BMI 32.79 kg/m²   Wt Readings from Last 3 Encounters:   03/25/21 191 lb (86.6 kg)   10/01/20 171 lb 3.2 oz (77.7 kg)   09/23/20 173 lb 6.4 oz (78.7 kg)     Body mass index is 32.79 kg/m². GENERAL - Alert, oriented, pleasant, in no apparent distress. EYES: No jaundice, no conjunctival pallor. Neck - Supple. No jugular venous distention noted. No carotid bruits. Cardiovascular  Normal S1 and S2 without obvious murmur or gallop. Extremities - No cyanosis, clubbing, or significant edema. Pulmonary  No respiratory distress. No wheezes or rales.       Lab Review   Lab Results   Component Value Date    TROPONINT <0.010 10/05/2017    TROPONINT 0.940 04/17/2016     Lab Results   Component Value Date     06/09/2015     05/27/2015    PROBNP 375.5 01/11/2018     Lab Results   Component Value Date    INR 0.98 02/09/2018    INR 1.03 01/03/2018     Lab Results   Component Value Date    LABA1C 5.0 10/05/2017    LABA1C 5.2 04/18/2016     Lab Results   Component Value Date    WBC 5.6 10/01/2020    WBC 6.4 02/20/2018    HCT 38.0 10/01/2020    HCT 39.8 05/17/2018    MCV 89.7 10/01/2020    MCV 90.9 05/17/2018     10/01/2020     05/17/2018     Lab Results   Component Value Date    CHOL 165 10/01/2020    CHOL 167 10/15/2018    TRIG 264 (H) 10/01/2020    TRIG 145 10/15/2018    HDL 36 (L) 10/01/2020    HDL 48 10/15/2018    LDLCALC 76 10/01/2020    LDLCALC 90 10/15/2018    LDLDIRECT 104 (H) 07/14/2015    LDLDIRECT 163 (H) 11/26/2014     Lab Results   Component Value Date    ALT 8 10/01/2020    ALT 15 10/15/2018    AST 11 10/01/2020    AST 21 10/15/2018     BMP:    Lab Results   Component Value Date     10/01/2020     03/04/2019    K 4.6 10/01/2020    K 4.8 03/04/2019     10/01/2020     03/04/2019    CO2 28 10/01/2020    CO2 23 03/04/2019    BUN 7 10/01/2020    BUN 7 03/04/2019    CREATININE 1.0 10/01/2020    CREATININE 0.8 03/04/2019     CMP:   Lab Results   Component Value Date     10/01/2020     03/04/2019    K 4.6 10/01/2020    K 4.8 03/04/2019     10/01/2020     03/04/2019    CO2 28 10/01/2020    CO2 23 03/04/2019    BUN 7 10/01/2020    BUN 7 03/04/2019    CREATININE 1.0 10/01/2020    CREATININE 0.8 03/04/2019    PROT 7.2 10/01/2020    PROT 7.6 05/17/2018     Lab Results   Component Value Date    TSH 1.240 12/01/2020    TSH 0.264 10/01/2020    TSHHS 1.470 10/05/2017    TSHHS 1.680 04/17/2016     ECHO 1/11/2018   This is a limited echo.    Left ventricular systolic function is diminished    Ejection fraction is visually estimated at 40%.    No evidence of LV thrombus. QUALITY MEASURES REVIEWED:  1.CAD:Patient is taking anti platelet agent:Yes  2. DYSLIPIDEMIA: Patient is on cholesterol lowering medication:Yes   3. Beta-Blocker therapy for CAD, if prior Myocardial Infarction:Yes   4. Counselled regarding smoking cessation. No   Patient does not Smoke. 5.Anticoagulation therapy (for A.Fib) Yes   Does Not have A.Fib.  6.Discussed weight management strategies. Assessment & Plan:    Primary / Secondary prevention is the goal by aggressive risk modification, healthy and therapeutic life style changes for cardiovascular risk reduction. Various goals are discussed and multiple questions answered. CAD:Yes   clinically stable. Patient is on optimal medical regimen ( see medication list above )  - Patient is currently  asymptomatic from CAD. - changes in  treatment:   no           - Testing ordered:  no  [facility] classification: 1  [place] RISK SCORE:  [place]ET[place] RISK SCORE:  HTN: no   CARDIOMYOPATHY:  known   CONGESTIVE HEART FAILURE:  KNOWN HISTORY. Compensated. VHD: No significant VHD noted  DYSLIPIDEMIA: Patient's profile is at / near Goal.yes,                                 HDL is low                                Tolerating current medical regimen wellyes,                                                               See most recent Lab values in Labs section above. OTHER RELEVANT DIAGNOSIS:as noted in patient's active problem list:  TESTS ORDERED: Echo / Stress Cardiolite. All previously ordered tests reviewed. ARRHYTHMIAS: known H/O SVT S/P Ablation   MEDICATIONS: CPM    Office f/u in six months.

## 2021-04-01 ENCOUNTER — OFFICE VISIT (OUTPATIENT)
Dept: FAMILY MEDICINE CLINIC | Age: 61
End: 2021-04-01
Payer: MEDICAID

## 2021-04-01 VITALS
DIASTOLIC BLOOD PRESSURE: 76 MMHG | HEART RATE: 62 BPM | OXYGEN SATURATION: 100 % | BODY MASS INDEX: 32.89 KG/M2 | WEIGHT: 191.6 LBS | SYSTOLIC BLOOD PRESSURE: 114 MMHG | TEMPERATURE: 97.2 F

## 2021-04-01 DIAGNOSIS — E03.4 HYPOTHYROIDISM DUE TO ACQUIRED ATROPHY OF THYROID: ICD-10-CM

## 2021-04-01 DIAGNOSIS — R35.0 URINARY FREQUENCY: ICD-10-CM

## 2021-04-01 DIAGNOSIS — N32.81 OAB (OVERACTIVE BLADDER): ICD-10-CM

## 2021-04-01 DIAGNOSIS — E66.09 NON MORBID OBESITY DUE TO EXCESS CALORIES: ICD-10-CM

## 2021-04-01 DIAGNOSIS — F17.200 TOBACCO DEPENDENCE: ICD-10-CM

## 2021-04-01 DIAGNOSIS — J44.9 CHRONIC OBSTRUCTIVE PULMONARY DISEASE, UNSPECIFIED COPD TYPE (HCC): ICD-10-CM

## 2021-04-01 DIAGNOSIS — J30.89 PERENNIAL ALLERGIC RHINITIS: Primary | ICD-10-CM

## 2021-04-01 PROCEDURE — G8417 CALC BMI ABV UP PARAM F/U: HCPCS | Performed by: FAMILY MEDICINE

## 2021-04-01 PROCEDURE — 3017F COLORECTAL CA SCREEN DOC REV: CPT | Performed by: FAMILY MEDICINE

## 2021-04-01 PROCEDURE — 3023F SPIROM DOC REV: CPT | Performed by: FAMILY MEDICINE

## 2021-04-01 PROCEDURE — 99214 OFFICE O/P EST MOD 30 MIN: CPT | Performed by: FAMILY MEDICINE

## 2021-04-01 PROCEDURE — 4004F PT TOBACCO SCREEN RCVD TLK: CPT | Performed by: FAMILY MEDICINE

## 2021-04-01 PROCEDURE — G8926 SPIRO NO PERF OR DOC: HCPCS | Performed by: FAMILY MEDICINE

## 2021-04-01 PROCEDURE — G8427 DOCREV CUR MEDS BY ELIG CLIN: HCPCS | Performed by: FAMILY MEDICINE

## 2021-04-01 RX ORDER — FLUTICASONE PROPIONATE 220 UG/1
AEROSOL, METERED RESPIRATORY (INHALATION)
Qty: 3 INHALER | Refills: 1 | Status: SHIPPED | OUTPATIENT
Start: 2021-04-01 | End: 2021-10-01 | Stop reason: SDUPTHER

## 2021-04-01 RX ORDER — FLUTICASONE PROPIONATE 50 MCG
SPRAY, SUSPENSION (ML) NASAL
Qty: 16 G | Refills: 5 | Status: SHIPPED | OUTPATIENT
Start: 2021-04-01 | End: 2021-10-01 | Stop reason: SDUPTHER

## 2021-04-01 RX ORDER — ONDANSETRON 4 MG/1
4 TABLET, ORALLY DISINTEGRATING ORAL 3 TIMES DAILY PRN
Qty: 30 TABLET | Refills: 1 | Status: SHIPPED | OUTPATIENT
Start: 2021-04-01 | End: 2021-10-01 | Stop reason: SDUPTHER

## 2021-04-01 RX ORDER — LEVOTHYROXINE SODIUM 88 UG/1
TABLET ORAL
Qty: 30 TABLET | Refills: 5 | Status: SHIPPED | OUTPATIENT
Start: 2021-04-01 | End: 2021-10-01 | Stop reason: SDUPTHER

## 2021-04-01 ASSESSMENT — PATIENT HEALTH QUESTIONNAIRE - PHQ9
SUM OF ALL RESPONSES TO PHQ QUESTIONS 1-9: 0
2. FEELING DOWN, DEPRESSED OR HOPELESS: 0
SUM OF ALL RESPONSES TO PHQ QUESTIONS 1-9: 0

## 2021-04-01 NOTE — PATIENT INSTRUCTIONS
Patient Education        Learning About COPD  What is COPD? COPD is a lung disease that makes it hard to breathe. COPD stands for chronic obstructive pulmonary disease. It is caused by damage to the lungs over many years, usually from smoking. COPD is a mix of two diseases: chronic bronchitis and emphysema. Other things that may put you at risk for COPD include breathing chemical fumes, dust, or air pollution over a long period of time. Secondhand smoke is also bad. In chronic bronchitis, the airways that carry air to the lungs (bronchial tubes) get inflamed and make a lot of mucus. This can narrow or block the airways, making it hard for you to breathe. In emphysema, the air sacs in your lungs are damaged and lose their stretch. Less air gets in and out of your lungs, which makes you feel short of breath. What can you expect when you have COPD? COPD gets worse over time. You cannot undo the damage to your lungs. Over time, you may find that:  · You get short of breath even when you do simple things like get dressed or fix a meal.  · It is hard to eat or exercise. · You lose weight and feel weaker. But there are things you can do to prevent more damage and feel better. What are the symptoms? The main symptoms are:  · A cough that will not go away. · Mucus that comes up when you cough. · Shortness of breath that gets worse with activity. At times, your symptoms may suddenly flare up and get much worse. This is a called a COPD exacerbation (say \"egg-ELIAS--BAY-juan carlos\"). When this happens, your usual symptoms quickly get worse and stay bad. This can be dangerous. You may have to go to the hospital.  How can you keep COPD from getting worse? Not smoking is the best way to keep COPD from getting worse. If you need help quitting, talk to your doctor about stop-smoking programs and medicines. Also, be sure to get your flu and pneumococcal vaccines.  And avoid air pollution, fumes, and dust as much as you can.  How is COPD treated? COPD is treated with medicines and oxygen. You also can take steps at home to stay healthy and keep your COPD from getting worse. Medicines and oxygen therapy  · You may be taking medicines such as:  ? Bronchodilators. These help open your airways and make breathing easier. Bronchodilators are either short-acting (work for 6 to 9 hours) or long-acting (work for 24 hours). You inhale most bronchodilators, so they start to act quickly. Always carry your quick-relief inhaler with you in case you need it while you are away from home. ? Corticosteroids. These reduce airway inflammation. They come in pill or inhaled form. You must take these medicines every day for them to work well. · Take your medicines exactly as prescribed. Call your doctor if you think you are having a problem with your medicine. · Oxygen therapy boosts the amount of oxygen in your blood and helps you breathe easier. Use the flow rate your doctor has recommended, and do not change it without talking to your doctor first.  Other care at home  · If your doctor recommends it, get more exercise. Walking is a good choice. Bit by bit, increase the amount you walk every day. Try for at least 30 minutes on most days of the week. · Learn breathing methodssuch as breathing through pursed lipsto help you become less short of breath. · If your doctor has not set you up with a pulmonary rehabilitation program, talk to him or her about whether rehab is right for you. Rehab includes exercise programs, education about your disease and how to manage it, help with diet and other changes, and emotional support. · Eat regular, healthy meals. Use bronchodilators about 1 hour before you eat to make it easier to eat. Eat several small meals instead of three large ones. Drink beverages at the end of the meal. Avoid foods that are hard to chew. Follow-up care is a key part of your treatment and safety.  Be sure to make and go to all

## 2021-04-01 NOTE — PROGRESS NOTES
SUBJECTIVE: Rea Ogden is a 64 y.o. female here for follow up of hypothyroidism. Scheduled Meds: levothyroxine 8 mcg daily      Lab Results   Component Value Date    TSH 1.240 12/01/2020     Thyroid ROS: denies fatigue, weight changes, heat/cold intolerance, bowel/skin changes or CVS symptoms. Obesity: Patient has gained 20 pounds in the past 6 months. She has not been exercising much. She is continuing to take the Metformin 500 mg. Allergic Rhinitis: Patient's symptoms include clear rhinorrhea and nasal congestion. These symptoms are perennial with seasonal exacerbation. Current triggers include exposure to pollens. The patient has been suffering from these symptoms for approximately 1 month. The patient has tried over the counter medications with poor relief of symptoms. She has used Flonase in the past with good control. Immunotherapy has never been tried. The patient has never had nasal polyps. The patient has no history of asthma. The patient does not suffer from frequent sinopulmonary infections. The patient has not had sinus surgery in the past. The patient has no history of eczema. Depression: Patient complains of depression with anxiety. She complains of depressed mood, insomnia and psychomotor agitation. Onset was approximately several years ago, controlled since that time. She denies current suicidal and homicidal plan or intent. Family history significant for no psychiatric illness. Possible organic causes contributing are: none. Risk factors: previous episode of depression Previous treatment includes Lexapro, Risperdal, and prn ativan ( about 3 times per month). She complains of the following side effects from the treatment: none. She is managed by psychiatry now.     COPD: Patient complains of dyspnea. Symptoms began several years ago. Symptoms chronic dyspnea does worsen with exertion. Sputum is clear  in small amounts. Fever has been absent. Patient uses 1 pillows at night. inhaler   7. Tobacco dependence  ondansetron (ZOFRAN-ODT) 4 MG disintegrating tablet          PLAN: current treatment plan effective, no change in therapy  orders as documented in EMR  need for compliance with treatment plan to achieve optimal outcome discussed  reviewed medications and side effects in detail  reviewed potential future medication changes and possible side effects thereof  return to clinic in 6 months.

## 2021-04-02 DIAGNOSIS — E78.5 DYSLIPIDEMIA: ICD-10-CM

## 2021-04-02 DIAGNOSIS — I21.4 NSTEMI (NON-ST ELEVATED MYOCARDIAL INFARCTION) (HCC): ICD-10-CM

## 2021-04-02 DIAGNOSIS — I25.110 CORONARY ARTERY DISEASE INVOLVING NATIVE CORONARY ARTERY OF NATIVE HEART WITH UNSTABLE ANGINA PECTORIS (HCC): ICD-10-CM

## 2021-04-02 DIAGNOSIS — I47.1 SVT (SUPRAVENTRICULAR TACHYCARDIA) (HCC): ICD-10-CM

## 2021-04-02 LAB — TSH SERPL DL<=0.05 MIU/L-ACNC: 1.32 MCIU/ML (ref 0.4–4.5)

## 2021-04-02 RX ORDER — ATORVASTATIN CALCIUM 40 MG/1
40 TABLET, FILM COATED ORAL DAILY
Qty: 30 TABLET | Refills: 5 | Status: SHIPPED | OUTPATIENT
Start: 2021-04-02 | End: 2021-09-30

## 2021-04-02 RX ORDER — CARVEDILOL 12.5 MG/1
12.5 TABLET ORAL 2 TIMES DAILY
Qty: 60 TABLET | Refills: 5 | Status: SHIPPED | OUTPATIENT
Start: 2021-04-02 | End: 2021-09-30

## 2021-04-05 ENCOUNTER — PROCEDURE VISIT (OUTPATIENT)
Dept: CARDIOLOGY CLINIC | Age: 61
End: 2021-04-05
Payer: MEDICAID

## 2021-04-05 DIAGNOSIS — I50.32 CHRONIC DIASTOLIC CONGESTIVE HEART FAILURE (HCC): ICD-10-CM

## 2021-04-05 DIAGNOSIS — E78.2 MIXED HYPERLIPIDEMIA: ICD-10-CM

## 2021-04-05 DIAGNOSIS — I10 HYPERTENSION, ESSENTIAL, BENIGN: ICD-10-CM

## 2021-04-05 DIAGNOSIS — E78.5 DYSLIPIDEMIA: ICD-10-CM

## 2021-04-05 DIAGNOSIS — R06.02 SOB (SHORTNESS OF BREATH): Primary | ICD-10-CM

## 2021-04-05 DIAGNOSIS — I25.110 CORONARY ARTERY DISEASE INVOLVING NATIVE CORONARY ARTERY OF NATIVE HEART WITH UNSTABLE ANGINA PECTORIS (HCC): ICD-10-CM

## 2021-04-05 DIAGNOSIS — Z98.890 S/P ABLATION OF ACCESSORY BYPASS TRACT: ICD-10-CM

## 2021-04-05 DIAGNOSIS — I21.4 NSTEMI (NON-ST ELEVATED MYOCARDIAL INFARCTION) (HCC): ICD-10-CM

## 2021-04-05 DIAGNOSIS — F17.200 TOBACCO USE DISORDER: ICD-10-CM

## 2021-04-05 DIAGNOSIS — E66.09 CLASS 1 OBESITY DUE TO EXCESS CALORIES WITH SERIOUS COMORBIDITY AND BODY MASS INDEX (BMI) OF 34.0 TO 34.9 IN ADULT: ICD-10-CM

## 2021-04-05 DIAGNOSIS — I47.1 SVT (SUPRAVENTRICULAR TACHYCARDIA) (HCC): ICD-10-CM

## 2021-04-05 LAB
LV EF: 43 %
LV EF: 53 %
LVEF MODALITY: NORMAL
LVEF MODALITY: NORMAL

## 2021-04-05 PROCEDURE — A9500 TC99M SESTAMIBI: HCPCS | Performed by: INTERNAL MEDICINE

## 2021-04-05 PROCEDURE — 93016 CV STRESS TEST SUPVJ ONLY: CPT | Performed by: INTERNAL MEDICINE

## 2021-04-05 PROCEDURE — 78452 HT MUSCLE IMAGE SPECT MULT: CPT | Performed by: INTERNAL MEDICINE

## 2021-04-05 PROCEDURE — 93017 CV STRESS TEST TRACING ONLY: CPT | Performed by: INTERNAL MEDICINE

## 2021-04-05 PROCEDURE — 93306 TTE W/DOPPLER COMPLETE: CPT | Performed by: INTERNAL MEDICINE

## 2021-04-05 PROCEDURE — 93018 CV STRESS TEST I&R ONLY: CPT | Performed by: INTERNAL MEDICINE

## 2021-04-06 ENCOUNTER — TELEPHONE (OUTPATIENT)
Dept: CARDIOLOGY CLINIC | Age: 61
End: 2021-04-06

## 2021-04-06 NOTE — TELEPHONE ENCOUNTER
Patient verbally understood and can not come in sooner then next Thursday. But ov has been made. Echo:  Left ventricular function is low normal, EF is estimated at 40-45%. Mild left ventricular hypertrophy. E/A reversal; indeterminate diastolic function. Hypokinesis of apical septum,apical cap,apical lateral wall. Mild mitral and tricuspid regurgitation is present. RVSP is 22 mmHg. No evidence of pericardial effusion    Stress test:  Abnormal Study.    Large apical MI, involving adelso apical & Infero apical portions.  No    reversible ischemia    Apical Hypokinesis with preserved LV function LVEF is 53 %

## 2021-04-15 ENCOUNTER — OFFICE VISIT (OUTPATIENT)
Dept: CARDIOLOGY CLINIC | Age: 61
End: 2021-04-15
Payer: MEDICAID

## 2021-04-15 VITALS
WEIGHT: 191.6 LBS | DIASTOLIC BLOOD PRESSURE: 74 MMHG | BODY MASS INDEX: 32.71 KG/M2 | HEIGHT: 64 IN | HEART RATE: 70 BPM | SYSTOLIC BLOOD PRESSURE: 128 MMHG

## 2021-04-15 DIAGNOSIS — I51.3 LV (LEFT VENTRICULAR) MURAL THROMBUS: ICD-10-CM

## 2021-04-15 DIAGNOSIS — E66.09 NON MORBID OBESITY DUE TO EXCESS CALORIES: ICD-10-CM

## 2021-04-15 DIAGNOSIS — I10 HYPERTENSION, ESSENTIAL, BENIGN: ICD-10-CM

## 2021-04-15 DIAGNOSIS — I25.110 CORONARY ARTERY DISEASE INVOLVING NATIVE CORONARY ARTERY OF NATIVE HEART WITH UNSTABLE ANGINA PECTORIS (HCC): Primary | ICD-10-CM

## 2021-04-15 DIAGNOSIS — I50.22 CHRONIC SYSTOLIC CONGESTIVE HEART FAILURE (HCC): ICD-10-CM

## 2021-04-15 DIAGNOSIS — E78.5 DYSLIPIDEMIA: ICD-10-CM

## 2021-04-15 DIAGNOSIS — E78.2 MIXED HYPERLIPIDEMIA: ICD-10-CM

## 2021-04-15 DIAGNOSIS — I47.1 SVT (SUPRAVENTRICULAR TACHYCARDIA) (HCC): ICD-10-CM

## 2021-04-15 DIAGNOSIS — I21.4 NSTEMI (NON-ST ELEVATED MYOCARDIAL INFARCTION) (HCC): ICD-10-CM

## 2021-04-15 DIAGNOSIS — Z98.890 S/P ABLATION OF ACCESSORY BYPASS TRACT: ICD-10-CM

## 2021-04-15 DIAGNOSIS — F17.200 TOBACCO USE DISORDER: ICD-10-CM

## 2021-04-15 PROCEDURE — 3017F COLORECTAL CA SCREEN DOC REV: CPT | Performed by: INTERNAL MEDICINE

## 2021-04-15 PROCEDURE — G8417 CALC BMI ABV UP PARAM F/U: HCPCS | Performed by: INTERNAL MEDICINE

## 2021-04-15 PROCEDURE — 4004F PT TOBACCO SCREEN RCVD TLK: CPT | Performed by: INTERNAL MEDICINE

## 2021-04-15 PROCEDURE — 99214 OFFICE O/P EST MOD 30 MIN: CPT | Performed by: INTERNAL MEDICINE

## 2021-04-15 PROCEDURE — G8427 DOCREV CUR MEDS BY ELIG CLIN: HCPCS | Performed by: INTERNAL MEDICINE

## 2021-04-15 NOTE — PROGRESS NOTES
OFFICE PROGRESS NOTES      Candida Mills is a 64 y.o. female who has    CHIEF COMPLAINT AS FOLLOWS:  CHEST PAIN: Patient denies any C/O chest pains at this time.      SOB:  C/O SOB since last December.               LEG EDEMA: C/O leg edema   PALPITATIONS: Denies any C/O Palpitations   DIZZINESS: No C/O Dizziness   SYNCOPE: None   OTHER: Still C/OFatigue since after COVID vaccination. HPI: Patient is here for F/U on her CAD, CMP, CHF,  HTN & Dyslipidemia problems. CAD: Patient has known Hx of  CAD. Had angioplasty  in the past.  CMP: Patient has known Hx of ischemic CMP. Being treated with guideline recommended medical / device therapy as stated below. CHF: Patient has known Hx of systolic (HFrEF) . Patient had been managed with medical regimen as stated below. HTN: Patient has known Hx of essential HTN. Has been treated with guideline recommended medical / physical/ diet therapy as stated below. Dyslipidemia: Patient has known Hx of mixed dyslipidemia. Has been treated with guideline recommended medical / physical/ diet therapy as stated below. He does not have any new complaints at this time.     Current Outpatient Medications   Medication Sig Dispense Refill    atorvastatin (LIPITOR) 40 MG tablet Take 1 tablet by mouth daily 30 tablet 5    carvedilol (COREG) 12.5 MG tablet Take 1 tablet by mouth 2 times daily 60 tablet 5    apixaban (ELIQUIS) 5 MG TABS tablet Take 1 tablet by mouth 2 times daily 60 tablet 5    fluticasone (FLONASE) 50 MCG/ACT nasal spray instill 2 sprays into each nostril once daily 16 g 5    levothyroxine (SYNTHROID) 88 MCG tablet take 1 tablet by mouth once daily 30 tablet 5    metFORMIN (GLUCOPHAGE) 500 MG tablet take 1 tablet by mouth twice a day 60 tablet 5    mirabegron (MYRBETRIQ) 25 MG TB24 Take 1 tablet by mouth daily 90 tablet 1    fluticasone (FLOVENT HFA) 220 MCG/ACT inhaler inhale 2 puffs by mouth twice a day 3 Inhaler 1    ondansetron (ZOFRAN-ODT) 4 MG disintegrating tablet Take 1 tablet by mouth 3 times daily as needed for Nausea or Vomiting 30 tablet 1    varenicline (CHANTIX STARTING MONTH PAK) 0.5 MG X 11 & 1 MG X 42 tablet Take by mouth. 1 box 0    losartan (COZAAR) 25 MG tablet Take 1 tablet by mouth daily 30 tablet 5    vitamin D3 (CHOLECALCIFEROL) 10 MCG (400 UNIT) TABS tablet Take 1 tablet by mouth daily 30 tablet 5    calcium carbonate 648 MG TABS Take 2 tablets by mouth daily 60 tablet 5    EPINEPHrine (EPIPEN) 0.3 MG/0.3ML SOAJ injection   0    LORazepam (ATIVAN) 0.5 MG tablet every 6 hours as needed. 0    donepezil (ARICEPT) 10 MG tablet Take 20 mg by mouth nightly      aspirin EC 81 MG EC tablet Take 1 tablet by mouth daily 90 tablet 3    amitriptyline (ELAVIL) 100 MG tablet Take 100 mg by mouth every evening  2    NAMZARIC 28-10 MG CP24 Take by mouth nightly   2     No current facility-administered medications for this visit. Allergies: Lisinopril and Other  Review of Systems:    Constitutional: Negative for diaphoresis and fatigue  Respiratory: Negative for shortness of breath  Cardiovascular: Negative for chest pain, dyspnea on exertion, claudication, edema, irregular heartbeat, murmur, palpitations or shortness of breath  Musculoskeletal: Negative for muscle pain, muscular weakness, negative for pain in arm and leg or swelling in foot and leg    Objective:  /74   Pulse 70   Ht 5' 4\" (1.626 m)   Wt 191 lb 9.6 oz (86.9 kg)   BMI 32.89 kg/m²   Wt Readings from Last 3 Encounters:   04/15/21 191 lb 9.6 oz (86.9 kg)   04/01/21 191 lb 9.6 oz (86.9 kg)   03/25/21 191 lb (86.6 kg)     Body mass index is 32.89 kg/m². GENERAL - Alert, oriented, pleasant, in no apparent distress. EYES: No jaundice, no conjunctival pallor. Neck - Supple. No jugular venous distention noted. No carotid bruits. Cardiovascular  Normal S1 and S2 without obvious murmur or gallop.     Extremities - No cyanosis, clubbing, or significant edema. Pulmonary  No respiratory distress. No wheezes or rales.       Lab Review   Lab Results   Component Value Date    TROPONINT <0.010 10/05/2017    TROPONINT 0.940 04/17/2016     Lab Results   Component Value Date     06/09/2015     05/27/2015    PROBNP 375.5 01/11/2018     Lab Results   Component Value Date    INR 0.98 02/09/2018    INR 1.03 01/03/2018     Lab Results   Component Value Date    LABA1C 5.0 10/05/2017    LABA1C 5.2 04/18/2016     Lab Results   Component Value Date    WBC 5.6 10/01/2020    WBC 6.4 02/20/2018    HCT 38.0 10/01/2020    HCT 39.8 05/17/2018    MCV 89.7 10/01/2020    MCV 90.9 05/17/2018     10/01/2020     05/17/2018     Lab Results   Component Value Date    CHOL 165 10/01/2020    CHOL 167 10/15/2018    TRIG 264 (H) 10/01/2020    TRIG 145 10/15/2018    HDL 36 (L) 10/01/2020    HDL 48 10/15/2018    LDLCALC 76 10/01/2020    LDLCALC 90 10/15/2018    LDLDIRECT 104 (H) 07/14/2015    LDLDIRECT 163 (H) 11/26/2014     Lab Results   Component Value Date    ALT 8 10/01/2020    ALT 15 10/15/2018    AST 11 10/01/2020    AST 21 10/15/2018     BMP:    Lab Results   Component Value Date     10/01/2020     03/04/2019    K 4.6 10/01/2020    K 4.8 03/04/2019     10/01/2020     03/04/2019    CO2 28 10/01/2020    CO2 23 03/04/2019    BUN 7 10/01/2020    BUN 7 03/04/2019    CREATININE 1.0 10/01/2020    CREATININE 0.8 03/04/2019     CMP:   Lab Results   Component Value Date     10/01/2020     03/04/2019    K 4.6 10/01/2020    K 4.8 03/04/2019     10/01/2020     03/04/2019    CO2 28 10/01/2020    CO2 23 03/04/2019    BUN 7 10/01/2020    BUN 7 03/04/2019    CREATININE 1.0 10/01/2020    CREATININE 0.8 03/04/2019    PROT 7.2 10/01/2020    PROT 7.6 05/17/2018     Lab Results   Component Value Date    TSH 1.320 04/01/2021    TSH 1.240 12/01/2020    TSHHS 1.470 10/05/2017    TSHHS 1.680 04/17/2016     ECHO 4/5/2020   Left 2 Echos. TESTS ORDERED: None this visit                                      All previously ordered tests reviewed.   ARRHYTHMIAS: known H/O SVT S/P Ablation   MEDICATIONS: Discussed with Patient & her daughter will stop Eliquis.   Office f/u in six months.

## 2021-04-15 NOTE — PATIENT INSTRUCTIONS
CAD:Yes   clinically stable. Patient is on optimal medical regimen ( see medication list above )  - Patient is currently  asymptomatic from CAD.          - changes in  treatment:   no           - Testing ordered:  no  [facility] classification: 1  [place] RISK SCORE:  [place]ET[place] RISK SCORE:  HTN: no   CARDIOMYOPATHY:  known   CONGESTIVE HEART FAILURE:  KNOWN HISTORY. Compensated.      VHD: No significant VHD noted  DYSLIPIDEMIA: Patient's profile is at / near [facility] is low                                Tolerating current medical regimen wellyes,                                                               See most recent Lab values in Labs section above. OTHER RELEVANT DIAGNOSIS:as noted in patient's active problem list:  Mural thrombus seen in 2016 is not described in last 2 Echos. TESTS ORDERED: None this visit                                      All previously ordered tests reviewed.   ARRHYTHMIAS: known H/O SVT S/P Ablation   MEDICATIONS: Discussed with Patient & her daughter will stop Eliquis.   Office f/u in six months.

## 2021-05-13 DIAGNOSIS — N32.81 OAB (OVERACTIVE BLADDER): Primary | ICD-10-CM

## 2021-05-13 RX ORDER — SOLIFENACIN SUCCINATE 10 MG/1
10 TABLET, FILM COATED ORAL DAILY
Qty: 30 TABLET | Refills: 5 | Status: SHIPPED | OUTPATIENT
Start: 2021-05-13 | End: 2021-10-04 | Stop reason: SDUPTHER

## 2021-08-29 DIAGNOSIS — I10 HYPERTENSION, ESSENTIAL, BENIGN: ICD-10-CM

## 2021-08-29 DIAGNOSIS — I25.110 CORONARY ARTERY DISEASE INVOLVING NATIVE CORONARY ARTERY OF NATIVE HEART WITH UNSTABLE ANGINA PECTORIS (HCC): ICD-10-CM

## 2021-08-30 RX ORDER — LOSARTAN POTASSIUM 25 MG/1
TABLET ORAL
Qty: 30 TABLET | Refills: 5 | Status: SHIPPED | OUTPATIENT
Start: 2021-08-30 | End: 2022-03-04

## 2021-09-30 ENCOUNTER — OFFICE VISIT (OUTPATIENT)
Dept: CARDIOLOGY CLINIC | Age: 61
End: 2021-09-30
Payer: MEDICAID

## 2021-09-30 VITALS
BODY MASS INDEX: 32.2 KG/M2 | HEART RATE: 92 BPM | WEIGHT: 188.6 LBS | DIASTOLIC BLOOD PRESSURE: 82 MMHG | HEIGHT: 64 IN | SYSTOLIC BLOOD PRESSURE: 130 MMHG

## 2021-09-30 DIAGNOSIS — F17.200 TOBACCO USE DISORDER: ICD-10-CM

## 2021-09-30 DIAGNOSIS — I47.1 SVT (SUPRAVENTRICULAR TACHYCARDIA) (HCC): ICD-10-CM

## 2021-09-30 DIAGNOSIS — I25.110 CORONARY ARTERY DISEASE INVOLVING NATIVE CORONARY ARTERY OF NATIVE HEART WITH UNSTABLE ANGINA PECTORIS (HCC): Primary | ICD-10-CM

## 2021-09-30 DIAGNOSIS — I25.110 CORONARY ARTERY DISEASE INVOLVING NATIVE CORONARY ARTERY OF NATIVE HEART WITH UNSTABLE ANGINA PECTORIS (HCC): ICD-10-CM

## 2021-09-30 DIAGNOSIS — I21.4 NSTEMI (NON-ST ELEVATED MYOCARDIAL INFARCTION) (HCC): ICD-10-CM

## 2021-09-30 DIAGNOSIS — E66.09 NON MORBID OBESITY DUE TO EXCESS CALORIES: ICD-10-CM

## 2021-09-30 DIAGNOSIS — E78.5 DYSLIPIDEMIA: ICD-10-CM

## 2021-09-30 DIAGNOSIS — I50.32 CHRONIC DIASTOLIC CONGESTIVE HEART FAILURE (HCC): ICD-10-CM

## 2021-09-30 DIAGNOSIS — I10 HYPERTENSION, ESSENTIAL, BENIGN: ICD-10-CM

## 2021-09-30 DIAGNOSIS — I51.3 LV (LEFT VENTRICULAR) MURAL THROMBUS: ICD-10-CM

## 2021-09-30 DIAGNOSIS — Z98.890 S/P ABLATION OF ACCESSORY BYPASS TRACT: ICD-10-CM

## 2021-09-30 PROCEDURE — G8417 CALC BMI ABV UP PARAM F/U: HCPCS | Performed by: INTERNAL MEDICINE

## 2021-09-30 PROCEDURE — G8427 DOCREV CUR MEDS BY ELIG CLIN: HCPCS | Performed by: INTERNAL MEDICINE

## 2021-09-30 PROCEDURE — 4004F PT TOBACCO SCREEN RCVD TLK: CPT | Performed by: INTERNAL MEDICINE

## 2021-09-30 PROCEDURE — 99214 OFFICE O/P EST MOD 30 MIN: CPT | Performed by: INTERNAL MEDICINE

## 2021-09-30 PROCEDURE — 3017F COLORECTAL CA SCREEN DOC REV: CPT | Performed by: INTERNAL MEDICINE

## 2021-09-30 NOTE — LETTER
Patient Name: Lorenzo Patel  : 1960  MRN# [id number]    REASON FOR VISIT:       Current Outpatient Medications   Medication Sig Dispense Refill    losartan (COZAAR) 25 MG tablet take 1 tablet by mouth once daily 30 tablet 5    solifenacin (VESICARE) 10 MG tablet Take 1 tablet by mouth daily 30 tablet 5    atorvastatin (LIPITOR) 40 MG tablet Take 1 tablet by mouth daily 30 tablet 5    carvedilol (COREG) 12.5 MG tablet Take 1 tablet by mouth 2 times daily 60 tablet 5    fluticasone (FLONASE) 50 MCG/ACT nasal spray instill 2 sprays into each nostril once daily 16 g 5    levothyroxine (SYNTHROID) 88 MCG tablet take 1 tablet by mouth once daily 30 tablet 5    metFORMIN (GLUCOPHAGE) 500 MG tablet take 1 tablet by mouth twice a day 60 tablet 5    mirabegron (MYRBETRIQ) 25 MG TB24 Take 1 tablet by mouth daily 90 tablet 1    fluticasone (FLOVENT HFA) 220 MCG/ACT inhaler inhale 2 puffs by mouth twice a day 3 Inhaler 1    ondansetron (ZOFRAN-ODT) 4 MG disintegrating tablet Take 1 tablet by mouth 3 times daily as needed for Nausea or Vomiting 30 tablet 1    varenicline (CHANTIX STARTING MONTH ) 0.5 MG X 11 & 1 MG X 42 tablet Take by mouth. 1 box 0    vitamin D3 (CHOLECALCIFEROL) 10 MCG (400 UNIT) TABS tablet Take 1 tablet by mouth daily 30 tablet 5    EPINEPHrine (EPIPEN) 0.3 MG/0.3ML SOAJ injection   0    LORazepam (ATIVAN) 0.5 MG tablet every 6 hours as needed. 0    donepezil (ARICEPT) 10 MG tablet Take 20 mg by mouth nightly      aspirin EC 81 MG EC tablet Take 1 tablet by mouth daily 90 tablet 3    amitriptyline (ELAVIL) 100 MG tablet Take 100 mg by mouth every evening  2    NAMZARIC 28-10 MG CP24 Take by mouth nightly   2     No current facility-administered medications for this visit. Smoke: What:                           How much:    Alcohol:                                      How Much:     Caffeine: Pop:         Tea:            Coffee: Chocolate:    Exercise:    Labs: Lipids:             CBC:       BMP/CMP:          TSH:              A1C:    Last Visit:  Complaints:  Changes:    Last EKG:      STRESS TEST:  4/2021  Abnormal Study.    Large apical MI, involving adelso apical & Infero apical portions. No    reversible ischemia    Apical Hypokinesis with preserved LV function LVEF is 53 %    Supervising physician Dr. Reggie Valdez . ECHO: 4/2021   Left ventricular function is low normal, EF is estimated at 40-45%. Mild left ventricular hypertrophy. E/A reversal; indeterminate diastolic function. Hypokinesis of apical septum,apical cap,apical lateral wall. Mild mitral and tricuspid regurgitation is present. RVSP is 22 mmHg. No evidence of pericardial effusion. CAROTID: NONE    MUGA: NONE    LAST PACER CHECK: NONE    CARDIAC CATH: 4/2016    Amio Protocol:    CHADS: [id number] Score for Atrial Fibrillation Stroke Risk   Risk   Factors  Component Value   C CHF Yes 1   H HTN Yes 1   A2 Age >= 76 No,  (61 y.o.) 0   D DM No 0   S2 Prior Stroke/TIA No 0   V Vascular Disease Yes 1   A Age 74-69 No,  (61 y.o.) 0   Sc Sex female 1    [id number]  Score  4   Score last updated 9/30/21 4:98 AM EDT    Click here for a link to the UpToDate guideline \"Atrial Fibrillation: Anticoagulation therapy to prevent embolization    Disclaimer: Risk Score calculation is dependent on accuracy of patient problem list and past encounter diagnosis.

## 2021-09-30 NOTE — LETTER
[address]  [address] [address]  Phone: [phone]  Fax: [phone]    Domitila Hurtado MD    September 30, 2021     Gwen Conrad MD  [address]  [facility] [address]    Patient: Rika Soto   MR Number: [id number]   YOB: 1960   Date of Visit: 9/30/2021       Dear Gwen Conrad:    Thank you for referring Rose Martinez to me for evaluation/treatment. Below are the relevant portions of my assessment and plan of care. If you have questions, please do not hesitate to call me. I look forward to following Yojana Osorio along with you.     Sincerely,      Domitila Hurtado MD

## 2021-10-01 ENCOUNTER — TELEPHONE (OUTPATIENT)
Dept: FAMILY MEDICINE CLINIC | Age: 61
End: 2021-10-01

## 2021-10-01 ENCOUNTER — OFFICE VISIT (OUTPATIENT)
Dept: FAMILY MEDICINE CLINIC | Age: 61
End: 2021-10-01
Payer: MEDICAID

## 2021-10-01 VITALS
TEMPERATURE: 98.4 F | WEIGHT: 187.6 LBS | SYSTOLIC BLOOD PRESSURE: 112 MMHG | HEART RATE: 93 BPM | DIASTOLIC BLOOD PRESSURE: 68 MMHG | BODY MASS INDEX: 32.2 KG/M2 | OXYGEN SATURATION: 98 %

## 2021-10-01 DIAGNOSIS — F17.200 TOBACCO DEPENDENCE: ICD-10-CM

## 2021-10-01 DIAGNOSIS — N32.81 OAB (OVERACTIVE BLADDER): ICD-10-CM

## 2021-10-01 DIAGNOSIS — J44.9 CHRONIC OBSTRUCTIVE PULMONARY DISEASE, UNSPECIFIED COPD TYPE (HCC): ICD-10-CM

## 2021-10-01 DIAGNOSIS — E66.09 CLASS 1 OBESITY DUE TO EXCESS CALORIES WITH SERIOUS COMORBIDITY AND BODY MASS INDEX (BMI) OF 32.0 TO 32.9 IN ADULT: ICD-10-CM

## 2021-10-01 DIAGNOSIS — E03.4 HYPOTHYROIDISM DUE TO ACQUIRED ATROPHY OF THYROID: Primary | ICD-10-CM

## 2021-10-01 DIAGNOSIS — T78.40XA ALLERGIC REACTION, INITIAL ENCOUNTER: ICD-10-CM

## 2021-10-01 DIAGNOSIS — R35.0 URINARY FREQUENCY: ICD-10-CM

## 2021-10-01 DIAGNOSIS — Z12.31 BREAST CANCER SCREENING BY MAMMOGRAM: ICD-10-CM

## 2021-10-01 DIAGNOSIS — E78.5 DYSLIPIDEMIA: ICD-10-CM

## 2021-10-01 DIAGNOSIS — J30.89 PERENNIAL ALLERGIC RHINITIS: ICD-10-CM

## 2021-10-01 PROCEDURE — 3023F SPIROM DOC REV: CPT | Performed by: FAMILY MEDICINE

## 2021-10-01 PROCEDURE — G8428 CUR MEDS NOT DOCUMENT: HCPCS | Performed by: FAMILY MEDICINE

## 2021-10-01 PROCEDURE — G8417 CALC BMI ABV UP PARAM F/U: HCPCS | Performed by: FAMILY MEDICINE

## 2021-10-01 PROCEDURE — 90471 IMMUNIZATION ADMIN: CPT | Performed by: FAMILY MEDICINE

## 2021-10-01 PROCEDURE — G8482 FLU IMMUNIZE ORDER/ADMIN: HCPCS | Performed by: FAMILY MEDICINE

## 2021-10-01 PROCEDURE — G8926 SPIRO NO PERF OR DOC: HCPCS | Performed by: FAMILY MEDICINE

## 2021-10-01 PROCEDURE — 99214 OFFICE O/P EST MOD 30 MIN: CPT | Performed by: FAMILY MEDICINE

## 2021-10-01 PROCEDURE — 90674 CCIIV4 VAC NO PRSV 0.5 ML IM: CPT | Performed by: FAMILY MEDICINE

## 2021-10-01 PROCEDURE — 4004F PT TOBACCO SCREEN RCVD TLK: CPT | Performed by: FAMILY MEDICINE

## 2021-10-01 PROCEDURE — 3017F COLORECTAL CA SCREEN DOC REV: CPT | Performed by: FAMILY MEDICINE

## 2021-10-01 RX ORDER — OMEGA-3S/DHA/EPA/FISH OIL/D3 300MG-1000
400 CAPSULE ORAL DAILY
Qty: 30 TABLET | Refills: 5 | Status: SHIPPED | OUTPATIENT
Start: 2021-10-01 | End: 2022-04-01 | Stop reason: SDUPTHER

## 2021-10-01 RX ORDER — CARVEDILOL 12.5 MG/1
12.5 TABLET ORAL 2 TIMES DAILY
Qty: 60 TABLET | Refills: 5 | Status: SHIPPED | OUTPATIENT
Start: 2021-10-01 | End: 2022-04-01 | Stop reason: SDUPTHER

## 2021-10-01 RX ORDER — ATORVASTATIN CALCIUM 40 MG/1
40 TABLET, FILM COATED ORAL DAILY
Qty: 30 TABLET | Refills: 5 | Status: SHIPPED | OUTPATIENT
Start: 2021-10-01 | End: 2022-04-01 | Stop reason: SDUPTHER

## 2021-10-01 RX ORDER — FLUTICASONE PROPIONATE 50 MCG
SPRAY, SUSPENSION (ML) NASAL
Qty: 16 G | Refills: 5 | Status: SHIPPED | OUTPATIENT
Start: 2021-10-01 | End: 2022-04-01 | Stop reason: SDUPTHER

## 2021-10-01 RX ORDER — ONDANSETRON 4 MG/1
4 TABLET, ORALLY DISINTEGRATING ORAL 3 TIMES DAILY PRN
Qty: 30 TABLET | Refills: 1 | Status: SHIPPED | OUTPATIENT
Start: 2021-10-01 | End: 2022-04-01 | Stop reason: SDUPTHER

## 2021-10-01 RX ORDER — FLUTICASONE PROPIONATE 220 UG/1
AEROSOL, METERED RESPIRATORY (INHALATION)
Qty: 3 EACH | Refills: 1 | Status: SHIPPED | OUTPATIENT
Start: 2021-10-01 | End: 2022-04-01 | Stop reason: SDUPTHER

## 2021-10-01 RX ORDER — HYDROXYZINE 50 MG/1
50 TABLET, FILM COATED ORAL EVERY 8 HOURS PRN
Qty: 90 TABLET | Refills: 0 | Status: SHIPPED | OUTPATIENT
Start: 2021-10-01 | End: 2022-04-01

## 2021-10-01 RX ORDER — LEVOTHYROXINE SODIUM 88 UG/1
TABLET ORAL
Qty: 30 TABLET | Refills: 5 | Status: SHIPPED | OUTPATIENT
Start: 2021-10-01 | End: 2022-04-01 | Stop reason: SDUPTHER

## 2021-10-01 ASSESSMENT — PATIENT HEALTH QUESTIONNAIRE - PHQ9
SUM OF ALL RESPONSES TO PHQ QUESTIONS 1-9: 0
1. LITTLE INTEREST OR PLEASURE IN DOING THINGS: 0
2. FEELING DOWN, DEPRESSED OR HOPELESS: 0
SUM OF ALL RESPONSES TO PHQ9 QUESTIONS 1 & 2: 0
SUM OF ALL RESPONSES TO PHQ QUESTIONS 1-9: 0
SUM OF ALL RESPONSES TO PHQ QUESTIONS 1-9: 0

## 2021-10-01 NOTE — TELEPHONE ENCOUNTER
Patient stated she has been nauseous the past two weeks. Patient stated the Zofran has helped in the past but she took the Zofran with the Chantix those times.

## 2021-10-01 NOTE — PATIENT INSTRUCTIONS
Patient Education        Hypothyroidism: Care Instructions  Your Care Instructions     When you have hypothyroidism, your body doesn't make enough thyroid hormone. This hormone helps your body use energy. If your thyroid level is low, you may feel tired, be constipated, have an increase in your blood pressure, or have dry skin or memory problems. You may also get cold easily, even when it is warm. Women with low thyroid levels may have heavy menstrual periods. A blood test to find your thyroid-stimulating hormone (TSH) level is used to check for hypothyroidism. A high TSH level may mean that you have it. The treatment for hypothyroidism is thyroid hormone pills. You should start to feel better in 1 to 2 weeks. Most people need treatment for the rest of their lives. You will need regular visits with your doctor to make sure you are doing well and that you have the right dose of medicine. Follow-up care is a key part of your treatment and safety. Be sure to make and go to all appointments, and call your doctor if you are having problems. It's also a good idea to know your test results and keep a list of the medicines you take. How can you care for yourself at home? · Take your thyroid hormone medicine exactly as prescribed. Call your doctor if you think you are having a problem with your medicine. Most people do not have side effects if they take the right amount of medicine regularly. ? Take the medicine 30 minutes before breakfast, and do not take it with calcium, vitamins, or iron. ? Do not take extra doses of your thyroid medicine. It will not help you get better any faster, and it may cause side effects. ? If you forget to take a dose, do NOT take a double dose of medicine. Take your usual dose the next day. · Tell your doctor about all prescription, herbal, or over-the-counter products you take. · Take care of yourself. Eat a healthy diet, get enough sleep, and get regular exercise.   When should you call for help? Call 911 anytime you think you may need emergency care. For example, call if:    · You passed out (lost consciousness).     · You have severe trouble breathing.     · You have a very slow heartbeat (less than 60 beats a minute).     · You have a low body temperature (95°F or below). Call your doctor now or seek immediate medical care if:    · You feel tired, sluggish, or weak.     · You have trouble remembering things or concentrating.     · You do not begin to feel better 2 weeks after starting your medicine. Watch closely for changes in your health, and be sure to contact your doctor if you have any problems. Where can you learn more? Go to https://NetSpark.Wriggle. org and sign in to your Unbabel account. Enter R667 in the Mapluck box to learn more about \"Hypothyroidism: Care Instructions. \"     If you do not have an account, please click on the \"Sign Up Now\" link. Current as of: December 2, 2020               Content Version: 13.0  © 2006-2021 Healthwise, Incorporated. Care instructions adapted under license by [facility] ([facility]). If you have questions about a medical condition or this instruction, always ask your healthcare professional. [address] any warranty or liability for your use of this information.

## 2021-10-01 NOTE — TELEPHONE ENCOUNTER
Chantix is currently off the market. How often is she having nausea? That the Zofran she had in the past worked for her?

## 2021-10-01 NOTE — PROGRESS NOTES
SUBJECTIVE: Davin Luque is a 64 y.o. female here for follow up of hypothyroidism. Scheduled Meds: levothyroxine 88 mcg daily    Lab Results   Component Value Date    TSH 1.320 04/01/2021     Thyroid ROS: denies fatigue, weight changes, heat/cold intolerance, bowel/skin changes or CVS symptoms. Obesity:  Has been on metformin 500 mg daily, but is isn't helping. Hyperlipidemia: Patient presents with hyperlipidemia. She was tested because screening. Her last labs showed Total cholesterol of 165, HDL 36, LDL 53,  Triglycerides 264. There is not a family history of hyperlipidemia. There is not a family history of early ischemia heart disease. OAB on myrbetriq, has good control. COPD: Patient complains of dyspnea. Symptoms began several years ago. Symptoms chronic dyspnea does worsen with exertion. Sputum is absent. Fever has been absent. Patient uses 1 pillows at night. Patient can walk 500 feet before resting. Patient currently is not on home oxygen therapy. [facility] Respiratory history: COPD      OBJECTIVE:   Vitals:    10/01/21 1301   BP: 112/68   Pulse: 93   Temp: 98.4 °F (36.9 °C)   SpO2: 98%     No acute distress. Alert and Oriented x 3  HEENT: Atraumatic. Normocephalic. PERRLA, EOMI, Conjunctiva clear  Oropharynx clear, mucosa moist.  Nasal mucosa normal  NECK: without thyromegaly, lymphadenopathy, JVD  LUNGS:Clear to ascultation bilaterally. Breathing comfortably  CARDIOVASCULAR:  Regular rate and rhythm, no murmurs, rubs, or gallops  EXTREMITY: Full range of motion. No clubbing/cyanosis/edema  NEURO: Cranial nerves II-XII grossly intact. Strength 5/5, DTR 2/4. SKIN: Warm, Dry, No rash. PSYCH: Mood and Affect normal.      ASSESSMENT:     Diagnosis Orders   1. Hypothyroidism due to acquired atrophy of thyroid  levothyroxine (SYNTHROID) 88 MCG tablet   Asymptomatic TSH without Reflex   2. Pruritus   hydrOXYzine (ATARAX) 50 MG tablet   3.  Urinary frequency   Controlled mirabegron (MYRBETRIQ) 25 MG TB24   4. OAB (overactive bladder)  mirabegron (MYRBETRIQ) 25 MG TB24   5. Chronic obstructive pulmonary disease, unspecified COPD type (HCC)   Controlled fluticasone (FLOVENT HFA) 220 MCG/ACT inhaler   6. Perennial allergic rhinitis  fluticasone (FLONASE) 50 MCG/ACT nasal spray   7. Dyslipidemia  Comprehensive Metabolic Panel   Asymptomatic Lipid Panel   8. Class 1 obesity due to excess calories with serious comorbidity and body mass index (BMI) of 32.0 to 32.9 in adult     9. Breast cancer screening by mammogram  DAYAN DIGITAL SCREEN W OR WO CAD BILATERAL         PLAN: current treatment plan effective, no change in therapy  orders as documented in EMR  need for compliance with treatment plan to achieve optimal outcome discussed  reviewed medications and side effects in detail  reviewed potential future medication changes and possible side effects thereof  return to clinic in 6 months.

## 2021-10-02 LAB
ALBUMIN/GLOBULIN RATIO: 1.7 RATIO (ref 0.8–2.6)
ALBUMIN: 4.1 G/DL (ref 3.5–5.2)
ALP BLD-CCNC: 118 U/L (ref 23–144)
ALT SERPL-CCNC: 10 U/L (ref 0–60)
AST SERPL-CCNC: 13 U/L (ref 0–55)
BILIRUB SERPL-MCNC: 0.2 MG/DL (ref 0–1.2)
BUN BLDV-MCNC: 9 MG/DL (ref 3–29)
BUN/CREAT BLD: 8 (ref 7–25)
CALCIUM SERPL-MCNC: 9.7 MG/DL (ref 8.5–10.5)
CHLORIDE BLD-SCNC: 102 MEQ/L (ref 96–110)
CHOLESTEROL: 174 MG/DL
CO2: 23 MEQ/L (ref 19–32)
CREAT SERPL-MCNC: 1.1 MG/DL (ref 0.5–1.2)
GFR AFRICAN AMERICAN: 64 MLS/MIN/1.73M2
GFR NON-AFRICAN AMERICAN: 55 MLS/MIN/1.73M2
GLOBULIN: 2.4 G/DL (ref 1.9–3.6)
GLUCOSE BLD-MCNC: 118 MG/DL (ref 70–99)
HDLC SERPL-MCNC: 37 MG/DL
LDL CHOLESTEROL CALCULATED: 105 MG/DL
POTASSIUM SERPL-SCNC: 4 MEQ/L (ref 3.4–5.3)
SODIUM BLD-SCNC: 138 MEQ/L (ref 135–148)
STATUS: ABNORMAL
TOTAL PROTEIN: 6.5 G/DL (ref 6–8.3)
TRIGL SERPL-MCNC: 162 MG/DL
TSH SERPL DL<=0.05 MIU/L-ACNC: 2.43 MCIU/ML (ref 0.4–4.5)
VLDLC SERPL CALC-MCNC: 32 MG/DL (ref 4–38)

## 2021-10-04 DIAGNOSIS — N32.81 OAB (OVERACTIVE BLADDER): ICD-10-CM

## 2021-10-04 RX ORDER — SOLIFENACIN SUCCINATE 10 MG/1
10 TABLET, FILM COATED ORAL DAILY
Qty: 30 TABLET | Refills: 5 | Status: SHIPPED | OUTPATIENT
Start: 2021-10-04 | End: 2022-04-01 | Stop reason: SDUPTHER

## 2021-10-21 ENCOUNTER — PROCEDURE VISIT (OUTPATIENT)
Dept: CARDIOLOGY CLINIC | Age: 61
End: 2021-10-21
Payer: MEDICAID

## 2021-10-21 DIAGNOSIS — I21.4 NSTEMI (NON-ST ELEVATED MYOCARDIAL INFARCTION) (HCC): ICD-10-CM

## 2021-10-21 DIAGNOSIS — I25.110 CORONARY ARTERY DISEASE INVOLVING NATIVE CORONARY ARTERY OF NATIVE HEART WITH UNSTABLE ANGINA PECTORIS (HCC): ICD-10-CM

## 2021-10-21 DIAGNOSIS — I83.893 VARICOSE VEINS OF BOTH LEGS WITH EDEMA: ICD-10-CM

## 2021-10-21 DIAGNOSIS — I47.1 SVT (SUPRAVENTRICULAR TACHYCARDIA) (HCC): ICD-10-CM

## 2021-10-21 DIAGNOSIS — F17.200 TOBACCO USE DISORDER: ICD-10-CM

## 2021-10-21 DIAGNOSIS — E78.5 DYSLIPIDEMIA: ICD-10-CM

## 2021-10-21 DIAGNOSIS — Z98.890 S/P ABLATION OF ACCESSORY BYPASS TRACT: ICD-10-CM

## 2021-10-21 DIAGNOSIS — I51.3 LV (LEFT VENTRICULAR) MURAL THROMBUS: ICD-10-CM

## 2021-10-21 DIAGNOSIS — I50.32 CHRONIC DIASTOLIC CONGESTIVE HEART FAILURE (HCC): ICD-10-CM

## 2021-10-21 DIAGNOSIS — E66.09 NON MORBID OBESITY DUE TO EXCESS CALORIES: ICD-10-CM

## 2021-10-21 DIAGNOSIS — I10 HYPERTENSION, ESSENTIAL, BENIGN: ICD-10-CM

## 2021-10-21 PROCEDURE — 93970 EXTREMITY STUDY: CPT | Performed by: INTERNAL MEDICINE

## 2021-10-28 ENCOUNTER — TELEPHONE (OUTPATIENT)
Dept: CARDIOLOGY CLINIC | Age: 61
End: 2021-10-28

## 2021-10-28 NOTE — TELEPHONE ENCOUNTER
Busy tone    Venous ultrasound   No evidence of DVT or SVT in the bilateral common femoral vein, femoral    vein, popliteal vein, greater saphenous vein or small saphenous vein.    Significant reflux noted of the Right CFV (1.4s) and GSV SFJ (0.6s).  Significant reflux noted in the Left GSV Mid Thigh (0.5s).

## 2022-01-20 ENCOUNTER — TELEPHONE (OUTPATIENT)
Dept: FAMILY MEDICINE CLINIC | Age: 62
End: 2022-01-20

## 2022-01-20 DIAGNOSIS — F17.200 TOBACCO DEPENDENCE: Primary | ICD-10-CM

## 2022-01-20 NOTE — TELEPHONE ENCOUNTER
----- Message from Virgin  sent at 1/20/2022  3:50 PM EST -----  Subject: Message to Provider    QUESTIONS  Information for Provider? Patient would like to have a prescription for   Nicotine patches called into [facility] on [address], @   [phone]. She used to take Chantix but it has been pulled from the   market. Please advise patient. Thank you.  ---------------------------------------------------------------------------  --------------  CALL BACK INFO  What is the best way for the office to contact you? OK to leave message on   [employer], OK to respond with electronic message via [place] portal (only   for patients who have registered [employer] account)  Preferred Call Back Phone Number? [phone]  ---------------------------------------------------------------------------  --------------  SCRIPT ANSWERS  Relationship to Patient?  Self

## 2022-01-21 RX ORDER — NICOTINE 21 MG/24HR
1 PATCH, TRANSDERMAL 24 HOURS TRANSDERMAL DAILY
Qty: 30 PATCH | Refills: 0 | Status: SHIPPED | OUTPATIENT
Start: 2022-01-21 | End: 2022-10-06

## 2022-01-21 NOTE — TELEPHONE ENCOUNTER
[facility]   Patient stated she currently smokes half a pack a day. Stated she is not in any smoking cessation classes.

## 2022-01-21 NOTE — TELEPHONE ENCOUNTER
Prescription for a 14 mg nicotine patch sent to the pharmacy. She should follow-up in 1 month either virtually or in person. I recommend joining a smoking cessation class either in person or online. This has been shown to increase her chances of becoming a non-smoker.

## 2022-02-21 ENCOUNTER — TELEMEDICINE (OUTPATIENT)
Dept: FAMILY MEDICINE CLINIC | Age: 62
End: 2022-02-21
Payer: MEDICAID

## 2022-02-21 DIAGNOSIS — F17.200 TOBACCO DEPENDENCE: Primary | ICD-10-CM

## 2022-02-21 PROCEDURE — G8428 CUR MEDS NOT DOCUMENT: HCPCS | Performed by: FAMILY MEDICINE

## 2022-02-21 PROCEDURE — 99213 OFFICE O/P EST LOW 20 MIN: CPT | Performed by: FAMILY MEDICINE

## 2022-02-21 PROCEDURE — 3017F COLORECTAL CA SCREEN DOC REV: CPT | Performed by: FAMILY MEDICINE

## 2022-02-21 RX ORDER — NICOTINE 21 MG/24HR
1 PATCH, TRANSDERMAL 24 HOURS TRANSDERMAL DAILY
Qty: 42 PATCH | Refills: 0 | Status: SHIPPED | OUTPATIENT
Start: 2022-02-21 | End: 2022-10-06

## 2022-02-21 NOTE — PROGRESS NOTES
2022    TELEHEALTH EVALUATION -- Audio/Visual (During DAEDS-43 public health emergency)    HPI:    Ernesto Teran (:  1960) has requested an audio/video evaluation for the following concern(s):    She has decreased her smoking to 1/4 ppd. \" when I used the 21 mg patch in the past, it worked better. \"  She is using an mandie on her phone that she goes to whenever she craves a cigarette. Seems to be helping. Review of Systems   All other systems reviewed and are negative. Prior to Visit Medications    Medication Sig Taking? Authorizing Provider   nicotine (NICODERM CQ) 14 MG/24HR Place 1 patch onto the skin daily  Carlota Dinero MD   solifenacin (VESICARE) 10 MG tablet Take 1 tablet by mouth daily  Carlota Dinero MD   atorvastatin (LIPITOR) 40 MG tablet Take 1 tablet by mouth daily  Marlene Forman MD   carvedilol (COREG) 12.5 MG tablet Take 1 tablet by mouth 2 times daily  Marlene Forman MD   hydrOXYzine (ATARAX) 50 MG tablet Take 1 tablet by mouth every 8 hours as needed for Itching  Carlota Dinero MD   levothyroxine (SYNTHROID) 88 MCG tablet take 1 tablet by mouth once daily  Carlota Dinero MD   vitamin D3 (CHOLECALCIFEROL) 10 MCG (400 UNIT) TABS tablet Take 1 tablet by mouth daily  Carlota Dinero MD   fluticasone (FLOVENT HFA) 220 MCG/ACT inhaler inhale 2 puffs by mouth twice a day  Carlota Dinero MD   fluticasone [facility]) 50 MCG/ACT nasal spray instill 2 sprays into each nostril once daily  Carlota Dinero MD   ondansetron (ZOFRAN-ODT) 4 MG disintegrating tablet Take 1 tablet by mouth 3 times daily as needed for Nausea or Vomiting  Carlota Dinero MD   losartan (COZAAR) 25 MG tablet take 1 tablet by mouth once daily  Marlene Forman MD   EPINEPHrine (EPIPEN) 0.3 MG/0.3ML SOAJ injection   Historical Provider, MD   LORazepam (ATIVAN) 0.5 MG tablet every 6 hours as needed.    Historical Provider, MD   donepezil (ARICEPT) 10 MG tablet Take 20 mg by mouth nightly  Historical Provider, MD aspirin EC 81 MG EC tablet Take 1 tablet by mouth daily  Celia Geiger MD   amitriptyline (ELAVIL) 100 MG tablet Take 100 mg by mouth every evening  Historical Provider, MD   [facility] [employer] [facility] 28-10 MG CP24 Take by mouth nightly   Historical Provider, MD       Social History     Tobacco Use    Smoking status: Current Every Day Smoker     Packs/day: 0.75     Types: Cigarettes     Start date: 4/6/2014    Smokeless tobacco: Never Used   Vaping Use    Vaping Use: Never used   Substance Use Topics    Alcohol use: No     Alcohol/week: 0.0 standard drinks     Comment: hx of alcoholism    Drug use: No        Allergies   Allergen Reactions    Lisinopril      angioedema    Other Other (See Comments)     Vicryl sutures, Pt does not heal with Vicryl sutures     ,   Past Medical History:   Diagnosis Date    Abnormal nuclear stress test     ACS (acute coronary syndrome) ([address]     Arterial ischemic stroke, MCA (middle cerebral artery), left, acute ([address]     CAD (coronary artery disease)     CHF (congestive heart failure) ([address]     COPD (chronic obstructive pulmonary disease) ([address]     CVA (cerebral vascular accident) ([address] 04/06/2014    H/O cardiovascular stress test 05/19/2016    treadmill    History of left heart catheterization 04/17/2016    Severe 2 vessel disease, but 3 vessel disease. 2.5x30 Resolute Stent placed to the LAD. Successful angio-seal deployment w/ excellent results.  History of stress test 03/05/2021    Large apical MI, involving adelso apical & Infero apical portions. No reversible ischemia  Apical Hypokinesis with preserved LV function LVEF is 53 %    Hx of cardiovascular stress test 06/22/2015    lexiscan-scar,EF47%    Hx of echocardiogram 04/08/2014    VALDEMAR: EF 40-45%. Right and left atrium/ventricles are normal. Mitral/Tricuspid valves normal. Mild aortic va;lve stenosis.  Hx of echocardiogram 04/18/2016    EF 35-40%. Borderline LA enlargement.  LVH w/ severe apical hypokinesis w/ apical nicotine patch to 21 mg per 24 hours for now. Continue using smoking cessation mandie  - nicotine (NICODERM CQ) 21 MG/24HR; Place 1 patch onto the skin daily  Dispense: 42 patch; Refill: 0      Return in about 6 weeks (around 4/4/2022) for Tobacco use. Faviola Solorzano, was evaluated through a synchronous (real-time) audio-video encounter. The patient (or guardian if applicable) is aware that this is a billable service, which includes applicable co-pays. This Virtual Visit was conducted with patient's (and/or legal guardian's) consent. The visit was conducted pursuant to the emergency declaration under the [address] [place] [address] authority and the [employer] and Netviewer General Act. Patient identification was verified, and a caregiver was present when appropriate. The patient was located at home in a state where the provider was licensed to provide care. Patient was at home during this virtual visit. Total time spent on this encounter: Not billed by time    --Ho Butt MD on 2/21/2022 at 1:30 PM    An electronic signature was used to authenticate this note.

## 2022-02-21 NOTE — PATIENT INSTRUCTIONS
Patient Education        Stopping Smoking: Care Instructions  Your Care Instructions     Cigarette smokers crave the nicotine in cigarettes. Giving it up is much harder than simply changing a habit. Your body has to stop craving the nicotine. It is hard to quit, but you can do it. There are many tools that people use to quit smoking. You may find that combining tools works best for you. There are several steps to quitting. First you get ready to quit. Then you get support to help you. After that, you learn new skills and behaviors to become a nonsmoker. For many people, a necessary step is getting and using medicine. Your doctor will help you set up the plan that best meets your needs. You may want to attend a smoking cessation program to help you quit smoking. When you choose a program, look for one that has proven success. Ask your doctor for ideas. You will greatly increase your chances of success if you take medicine as well as get counseling or join a cessation program.  Some of the changes you feel when you first quit tobacco are uncomfortable. Your body will miss the nicotine at first, and you may feel short-tempered and grumpy. You may have trouble sleeping or concentrating. Medicine can help you deal with these symptoms. You may struggle with changing your smoking habits and rituals. The last step is the tricky one: Be prepared for the smoking urge to continue for a time. This is a lot to deal with, but keep at it. You will feel better. Follow-up care is a key part of your treatment and safety. Be sure to make and go to all appointments, and call your doctor if you are having problems. It's also a good idea to know your test results and keep a list of the medicines you take. How can you care for yourself at home? · Ask your family, friends, and coworkers for support. You have a better chance of quitting if you have help and support.   · Join a support group, such as Nicotine Anonymous, for people who are trying to quit smoking. · Consider signing up for a smoking cessation program, such as the American Lung Association's Freedom from Smoking program.  · Get text messaging support. Go to the website at www.smokefree. gov to sign up for the [facility] program.  · Set a quit date. Pick your date carefully so that it is not right in the middle of a big deadline or stressful time. Once you quit, do not even take a puff. Get rid of all ashtrays and lighters after your last cigarette. Clean your house and your clothes so that they do not smell of smoke. · Learn how to be a nonsmoker. Think about ways you can avoid those things that make you reach for a cigarette. ? Avoid situations that put you at greatest risk for smoking. For some people, it is hard to have a drink with friends without smoking. For others, they might skip a coffee break with coworkers who smoke. ? Change your daily routine. Take a different route to work or eat a meal in a different place. · Cut down on stress. Calm yourself or release tension by doing an activity you enjoy, such as reading a book, taking a hot bath, or gardening. · Talk to your doctor or pharmacist about nicotine replacement therapy, which replaces the nicotine in your body. You still get nicotine but you do not use tobacco. Nicotine replacement products help you slowly reduce the amount of nicotine you need. These products come in several forms, many of them available over-the-counter:  ? Nicotine patches  ? Nicotine gum and lozenges  ? Nicotine inhaler  · Ask your doctor about bupropion (Wellbutrin) or varenicline (Chantix), which are prescription medicines. They do not contain nicotine. They help you by reducing withdrawal symptoms, such as stress and anxiety. · Some people find hypnosis, acupuncture, and massage helpful for ending the smoking habit. · Eat a healthy diet and get regular exercise.  Having healthy habits will help your body move past its craving for nicotine. · Be prepared to keep trying. Most people are not successful the first few times they try to quit. Do not get mad at yourself if you smoke again. Make a list of things you learned and think about when you want to try again, such as next week, next month, or next year. Where can you learn more? Go to https://Didatuanpedamariewkaryn.Must See India. org and sign in to your TITIN Tech account. Enter G343 in the Fobbler box to learn more about \"Stopping Smoking: Care Instructions. \"     If you do not have an account, please click on the \"Sign Up Now\" link. Current as of: February 11, 2021               Content Version: 13.1  © 2006-2021 Healthwise, Incorporated. Care instructions adapted under license by [facility] ([facility]). If you have questions about a medical condition or this instruction, always ask your healthcare professional. [address] any warranty or liability for your use of this information.

## 2022-03-04 DIAGNOSIS — I10 HYPERTENSION, ESSENTIAL, BENIGN: ICD-10-CM

## 2022-03-04 DIAGNOSIS — I25.110 CORONARY ARTERY DISEASE INVOLVING NATIVE CORONARY ARTERY OF NATIVE HEART WITH UNSTABLE ANGINA PECTORIS (HCC): ICD-10-CM

## 2022-03-04 RX ORDER — LOSARTAN POTASSIUM 25 MG/1
TABLET ORAL
Qty: 30 TABLET | Refills: 5 | Status: SHIPPED | OUTPATIENT
Start: 2022-03-04 | End: 2022-10-12 | Stop reason: SDUPTHER

## 2022-03-09 ENCOUNTER — TELEPHONE (OUTPATIENT)
Dept: CARDIOLOGY CLINIC | Age: 62
End: 2022-03-09

## 2022-03-09 NOTE — TELEPHONE ENCOUNTER
Cardiologist: Dr. Daniela Lucas  Surgeon: Dr. Wilberto العلي  Surgery: 1st MTPJ fusion, poss closing base wedge osteotomy, 1st metatarsal, weil osteotomy, 2nd & 3rd matatarsals, hammer toe correction  Anesthesia: General  Date: Pending  FAX# [phone]  # [phone]    Last OV 9/30/2021 w/Carline       CAD:Yes   clinically stable. Patient is on optimal medical regimen ( see medication list above )  - Patient is currently  asymptomatic from CAD.          - changes in  treatment:   no           - Testing ordered:  no  [place] classification: 1  [place] RISK SCORE:  [place] RISK SCORE:  HTN: no   CARDIOMYOPATHY:  known   CONGESTIVE HEART FAILURE:  KNOWN HISTORY. Compensated.      VHD: No significant VHD noted  DYSLIPIDEMIA: Patient's profile is at / near [facility] is low                                Tolerating current medical regimen wellyes,                                                               See most recent Lab values in Labs section above. OTHER RELEVANT DIAGNOSIS:as noted in patient's active problem list:  CVI: Patient has symptomatic, clinically C4 venous disease. Patient to continue to wear compression stockings. Mural thrombus seen in 2016 is not described in last 2 Echos. TESTS ORDERED: Venous US                                      All previously ordered tests reviewed.   ARRHYTHMIAS: known H/O SVT S/P Ablation   MEDICATIONS: CPM.   Office f/u in six months.       NM- 4/5/2021  Abnormal Study.    Large apical MI, involving adelso apical & Infero apical portions. No    reversible ischemia    Apical Hypokinesis with preserved LV function LVEF is 53 %       Echo- 4/5/2021   Left ventricular function is low normal, EF is estimated at 40-45%. Mild left ventricular hypertrophy. E/A reversal; indeterminate diastolic function. Hypokinesis of apical septum,apical cap,apical lateral wall. Mild mitral and tricuspid regurgitation is present. RVSP is 22 mmHg.    No evidence of pericardial effusion.       Cath- 4/17/2016    Aspirin

## 2022-03-29 DIAGNOSIS — J44.9 CHRONIC OBSTRUCTIVE PULMONARY DISEASE, UNSPECIFIED COPD TYPE (HCC): ICD-10-CM

## 2022-03-29 RX ORDER — FLUTICASONE PROPIONATE 220 UG/1
AEROSOL, METERED RESPIRATORY (INHALATION)
Qty: 36 G | OUTPATIENT
Start: 2022-03-29

## 2022-03-31 ENCOUNTER — OFFICE VISIT (OUTPATIENT)
Dept: CARDIOLOGY CLINIC | Age: 62
End: 2022-03-31
Payer: MEDICAID

## 2022-03-31 VITALS
DIASTOLIC BLOOD PRESSURE: 76 MMHG | BODY MASS INDEX: 30.87 KG/M2 | SYSTOLIC BLOOD PRESSURE: 118 MMHG | WEIGHT: 180.8 LBS | HEART RATE: 89 BPM | HEIGHT: 64 IN

## 2022-03-31 DIAGNOSIS — Z98.890 S/P ABLATION OF ACCESSORY BYPASS TRACT: ICD-10-CM

## 2022-03-31 DIAGNOSIS — F17.200 TOBACCO USE DISORDER: ICD-10-CM

## 2022-03-31 DIAGNOSIS — E66.09 NON MORBID OBESITY DUE TO EXCESS CALORIES: ICD-10-CM

## 2022-03-31 DIAGNOSIS — I10 HYPERTENSION, ESSENTIAL, BENIGN: ICD-10-CM

## 2022-03-31 DIAGNOSIS — I25.110 CORONARY ARTERY DISEASE INVOLVING NATIVE CORONARY ARTERY OF NATIVE HEART WITH UNSTABLE ANGINA PECTORIS (HCC): Primary | ICD-10-CM

## 2022-03-31 DIAGNOSIS — E78.2 MIXED HYPERLIPIDEMIA: ICD-10-CM

## 2022-03-31 DIAGNOSIS — E78.5 DYSLIPIDEMIA: ICD-10-CM

## 2022-03-31 DIAGNOSIS — I21.4 NSTEMI (NON-ST ELEVATED MYOCARDIAL INFARCTION) (HCC): ICD-10-CM

## 2022-03-31 DIAGNOSIS — I47.1 SVT (SUPRAVENTRICULAR TACHYCARDIA) (HCC): ICD-10-CM

## 2022-03-31 DIAGNOSIS — R60.0 EDEMA, LOWER EXTREMITY: ICD-10-CM

## 2022-03-31 DIAGNOSIS — I51.3 LV (LEFT VENTRICULAR) MURAL THROMBUS: ICD-10-CM

## 2022-03-31 DIAGNOSIS — I50.32 CHRONIC DIASTOLIC CONGESTIVE HEART FAILURE (HCC): ICD-10-CM

## 2022-03-31 PROCEDURE — G8482 FLU IMMUNIZE ORDER/ADMIN: HCPCS | Performed by: INTERNAL MEDICINE

## 2022-03-31 PROCEDURE — G8427 DOCREV CUR MEDS BY ELIG CLIN: HCPCS | Performed by: INTERNAL MEDICINE

## 2022-03-31 PROCEDURE — 4004F PT TOBACCO SCREEN RCVD TLK: CPT | Performed by: INTERNAL MEDICINE

## 2022-03-31 PROCEDURE — 3017F COLORECTAL CA SCREEN DOC REV: CPT | Performed by: INTERNAL MEDICINE

## 2022-03-31 PROCEDURE — G8417 CALC BMI ABV UP PARAM F/U: HCPCS | Performed by: INTERNAL MEDICINE

## 2022-03-31 PROCEDURE — 93000 ELECTROCARDIOGRAM COMPLETE: CPT | Performed by: INTERNAL MEDICINE

## 2022-03-31 PROCEDURE — 99214 OFFICE O/P EST MOD 30 MIN: CPT | Performed by: INTERNAL MEDICINE

## 2022-03-31 RX ORDER — CARVEDILOL 12.5 MG/1
12.5 TABLET ORAL 2 TIMES DAILY
Qty: 60 TABLET | Refills: 5 | Status: CANCELLED | OUTPATIENT
Start: 2022-03-31

## 2022-03-31 RX ORDER — ATORVASTATIN CALCIUM 40 MG/1
40 TABLET, FILM COATED ORAL DAILY
Qty: 30 TABLET | Refills: 5 | Status: CANCELLED | OUTPATIENT
Start: 2022-03-31

## 2022-03-31 NOTE — LETTER
[place] [address]  [address] [place] [address] [address]  Phone: [phone]  Fax: [phone]    Teetee Solorzano MD    March 31, 2022     Willian Nageotte, MD  [address]  [place] [address]    Patient: Joya Napier   MR Number: [id number]   YOB: 1960   Date of Visit: 3/31/2022       Dear Willian Nageotte:    Thank you for referring Devika Key to me for evaluation/treatment. Below are the relevant portions of my assessment and plan of care. If you have questions, please do not hesitate to call me. I look forward to following Arianna Walker along with you.     Sincerely,      Teetee Sloorzano MD

## 2022-03-31 NOTE — LETTER
Todd Beauchamp  1960  [id number]    Have you had any Chest Pain that is not new? - No  If Yes DO EKG - How does it feel -    How long does the pain last -      How long have you been having the pain -    Did you take a    And did it relieve the pain -      DO EKG IF: Patient has a Heart Rate above 100 or below 40     CAD (Coronary Artery Disease) patient should have one on file every 6 months        Have you had any Shortness of Breath - No  If Yes - When     Have you had any dizziness - No  If Yes DO ORTHOSTATIC BP - when do you feel dizzy    How long does it last .        Sitting wait 5 minutes do supine (laying down) wait 5 minutes then do standing - log each in \"vitals\" area in Epic   Be sure to ask what symptoms they are having if they get dizzy while completing ortho stats such as: room spinning, nausea, etc.    Have you had any palpitations that are not new? - No  If Yes DO EKG - Do you feel your heart   How long does it last - . Is the patient on any of the following medications -   If Yes DO EKG - Needs done every 6 months    Do you have any edema - swelling in No    If Yes - CHECK TO SEE IF THE EDEMA IS PITTING  How long have they been having edema -   If Yes - Have they worn compression stockings   If they have worn compression stockings      Vein \"LEG PROBLEM Questionnaire\"  1. Do you have prominent leg veins? 2. Do you have any skin discoloration? 3. Do you have any healed or active sores? 4. Do you have swelling of the legs? 5. Do you have a family history of varicose veins? 6. Does your profession involve pro-longed        standing or heavy lifting? 7. Have you been fighting overweight problems? 8. Do you have restless legs? 9. Do you have any night time cramps?     10. Do you have any of the following in your legs:             When did you have your last labs drawn   Where did you have them done

## 2022-03-31 NOTE — PROGRESS NOTES
OFFICE PROGRESS NOTES      Priscila Eldridge is a 63 y.o. female who has    CHIEF COMPLAINT AS FOLLOWS:  CHEST PAIN: Patient denies any C/O chest pains at this time. SOB: No C/O SOB at this time. LEG EDEMA: B/L Lower extremity edema is present but no change over previous. PALPITATIONS: Denies any C/O Palpitations   DIZZINESS: No C/O Dizziness   SYNCOPE: None   OTHER/ ADDITIONAL COMPLAINTS: nEED FOOT SURGERY                                    HPI: Patient is here for F/U on his CAD, VHD, CMP, HTN & Dyslipidemia problems. CAD: Patient has known CAD. Had angioplasty in the past.  CMP: Patient has known  ischemic CMP. Being treated with guideline recommended medical / device therapy as stated below. CHF: Patient has known systolic (HFrEF)   . Patient had been managed with medical regimen as stated below. HTN: Patient has known essential HTN. Has been treated with guideline recommended medical / physical/ diet therapy as stated below. Dyslipidemia: Patient has known mixed dyslipidemia. Has been treated with guideline recommended medical / physical/ diet therapy as stated below.                 Current Outpatient Medications   Medication Sig Dispense Refill    losartan (COZAAR) 25 MG tablet take 1 tablet by mouth once daily 30 tablet 5    nicotine (NICODERM CQ) 21 MG/24HR Place 1 patch onto the skin daily 42 patch 0    nicotine (NICODERM CQ) 14 MG/24HR Place 1 patch onto the skin daily 30 patch 0    solifenacin (VESICARE) 10 MG tablet Take 1 tablet by mouth daily 30 tablet 5    atorvastatin (LIPITOR) 40 MG tablet Take 1 tablet by mouth daily 30 tablet 5    carvedilol (COREG) 12.5 MG tablet Take 1 tablet by mouth 2 times daily 60 tablet 5    hydrOXYzine (ATARAX) 50 MG tablet Take 1 tablet by mouth every 8 hours as needed for Itching 90 tablet 0    levothyroxine (SYNTHROID) 88 MCG tablet take 1 tablet by mouth once daily 30 tablet 5    vitamin D3 (CHOLECALCIFEROL) 10 MCG (400 UNIT) TABS tablet Take 1 tablet by mouth daily 30 tablet 5    fluticasone (FLOVENT HFA) 220 MCG/ACT inhaler inhale 2 puffs by mouth twice a day 3 each 1    fluticasone (FLONASE) 50 MCG/ACT nasal spray instill 2 sprays into each nostril once daily 16 g 5    ondansetron (ZOFRAN-ODT) 4 MG disintegrating tablet Take 1 tablet by mouth 3 times daily as needed for Nausea or Vomiting 30 tablet 1    EPINEPHrine (EPIPEN) 0.3 MG/0.3ML SOAJ injection   0    LORazepam (ATIVAN) 0.5 MG tablet every 6 hours as needed. 0    donepezil (ARICEPT) 10 MG tablet Take 20 mg by mouth nightly      aspirin EC 81 MG EC tablet Take 1 tablet by mouth daily 90 tablet 3    amitriptyline (ELAVIL) 100 MG tablet Take 100 mg by mouth every evening  2    NAMZARIC 28-10 MG CP24 Take by mouth nightly   2     No current facility-administered medications for this visit. Allergies: Lisinopril and Other  Review of Systems:    Constitutional: Negative for diaphoresis and fatigue  Respiratory: Negative for shortness of breath  Cardiovascular: Negative for chest pain, dyspnea on exertion, claudication, edema, irregular heartbeat, murmur, palpitations or shortness of breath  Musculoskeletal: Negative for muscle pain, muscular weakness, negative for pain in arm and leg or swelling in foot and leg    Objective:  /76   Pulse 89   Ht 5' 4\" (1.626 m)   Wt 180 lb 12.8 oz (82 kg)   BMI 31.03 kg/m²   Wt Readings from Last 3 Encounters:   03/31/22 180 lb 12.8 oz (82 kg)   10/01/21 187 lb 9.6 oz (85.1 kg)   09/30/21 188 lb 9.6 oz (85.5 kg)     Body mass index is 31.03 kg/m². GENERAL - Alert, oriented, pleasant, in no apparent distress. EYES: No jaundice, no conjunctival pallor. Neck - Supple. No jugular venous distention noted. No carotid bruits. Cardiovascular - Normal S1 and S2 without obvious murmur or gallop. Extremities - No cyanosis, clubbing, or significant edema. Pulmonary - No respiratory distress. No wheezes or rales.       MEDICAL DECISION MAKING & DATA REVIEW:    Lab Review   Lab Results   Component Value Date    TROPONINT <0.010 10/05/2017    TROPONINT 0.940 04/17/2016     Lab Results   Component Value Date     06/09/2015     05/27/2015    PROBNP 375.5 01/11/2018     Lab Results   Component Value Date    INR 0.98 02/09/2018    INR 1.03 01/03/2018     Lab Results   Component Value Date    LABA1C 5.0 10/05/2017    LABA1C 5.2 04/18/2016     Lab Results   Component Value Date    WBC 5.6 10/01/2020    WBC 6.4 02/20/2018    HCT 38.0 10/01/2020    HCT 39.8 05/17/2018    MCV 89.7 10/01/2020    MCV 90.9 05/17/2018     10/01/2020     05/17/2018     Lab Results   Component Value Date    CHOL 174 10/01/2021    CHOL 165 10/01/2020    TRIG 162 (H) 10/01/2021    TRIG 264 (H) 10/01/2020    HDL 37 (L) 10/01/2021    HDL 36 (L) 10/01/2020    LDLCALC 105 (H) 10/01/2021    LDLCALC 76 10/01/2020    LDLDIRECT 104 (H) 07/14/2015    LDLDIRECT 163 (H) 11/26/2014     Lab Results   Component Value Date    ALT 10 10/01/2021    ALT 8 10/01/2020    AST 13 10/01/2021    AST 11 10/01/2020     BMP:    Lab Results   Component Value Date     10/01/2021     10/01/2020    K 4.0 10/01/2021    K 4.6 10/01/2020     10/01/2021     10/01/2020    CO2 23 10/01/2021    CO2 28 10/01/2020    BUN 9 10/01/2021    BUN 7 10/01/2020    CREATININE 1.1 10/01/2021    CREATININE 1.0 10/01/2020     CMP:   Lab Results   Component Value Date     10/01/2021     10/01/2020    K 4.0 10/01/2021    K 4.6 10/01/2020     10/01/2021     10/01/2020    CO2 23 10/01/2021    CO2 28 10/01/2020    BUN 9 10/01/2021    BUN 7 10/01/2020    CREATININE 1.1 10/01/2021    CREATININE 1.0 10/01/2020    PROT 6.5 10/01/2021    PROT 7.2 10/01/2020     Lab Results   Component Value Date    TSH 2.430 10/01/2021    TSH 1.320 04/01/2021    TSHHS 1.470 10/05/2017    TSHHS 1.680 04/17/2016       QUALITY MEASURES REVIEWED:  1.CAD:Patient is taking anti platelet agent: Yes  2. DYSLIPIDEMIA: Patient is on cholesterol lowering medication:Yes   3. Beta-Blocker therapy for CAD, if prior Myocardial Infarction:Yes   4. Counselled regarding smoking cessation. Yes   5. Anticoagulation therapy (for A.Fib) No   Does Not have A.Fib.  6.Discussed weight management strategies. Assessment & Plan:  Primary / Secondary prevention is the goal by aggressive risk modification, healthy and therapeutic life style changes for cardiovascular risk reduction. CORONARY ARTERY DISEASE:Yes   clinically stable. Patient is on optimal medical regimen ( see medication list above )  - Patient is currently  asymptomatic from CAD.          - changes in  treatment:   no, ON asa & Coreg           - Testing ordered:  no  [place] classification: 1  CARDIOLITE  4/5/2021    Large apical MI, involving adelso apical & Infero apical portions. No    reversible ischemia    Apical Hypokinesis with preserved LV function LVEF is 53 %     HTN: Well controlled, on Losartan & Coreg   CARDIOMYOPATHY:  known   CONGESTIVE HEART FAILURE:  KNOWN HISTORY. Compensated.      VHD: No significant VHD noted  ECHO 4/5/2020   Left ventricular function is low normal, EF is estimated at 40-45%.   Mild left ventricular hypertrophy.  E/A reversal; indeterminate diastolic function.   Hypokinesis of apical septum,apical cap,apical lateral wall.   Mild mitral and tricuspid regurgitation is present.  RVSP is 22 mmHg.   No evidence of pericardial effusion. DYSLIPIDEMIA: Patient's profile is at / near [facility] is low                                Tolerating current medical regimen wellyes,  takes Lipitor                                                            See most recent Lab values in Labs section above. CVI: Patient has symptomatic, clinically C4 venous disease. Patient to continue to wear compression stockings.   10/2021    No evidence of DVT or SVT in the bilateral common femoral vein, femoral    vein, popliteal vein, greater saphenous vein or small saphenous vein.    Significant reflux noted of the Right CFV (1.4s) and GSV SFJ (0.6s).  Significant reflux noted in the Left GSV Mid Thigh (0.5s). Mural thrombus seen in 2016 is not described in last 2 Echos.     ARRHYTHMIAS: known H/O SVT S/P Ablation    TESTS ORDERED: Serial ablations of both GSVs     PREVIOUSLY ORDERED TESTS REVIEWED & DISCUSSED WITH THE PATIENT:     I personally reviewed & interpreted, all previously ordered tests as copied above. Latest Labs are pulled in to the note with dates. Labs, specially in Reference to Lipid profile, Cardiac testing in the form of Echo ( dated: ), stress tests ( dated: ) & other relevant cardiac testing reviewed with patient & recommendations made based on assessment of the results. Discussed role of Cardiac risk factors & effects + treatment of co morbidities with patient & advised accordingly. MEDICATIONS: List of medications patient is currently taking is reviewed in detail with the patient. Discussed any side effects or problems taking the medication. Recommend Continue present management & medications as listed. AFFIRMATION: I  Review patient's history, previous & current medical problems & all Labs + testing. This includes chart prep even prior to the vosit. Various goals are discussed and multiple questions answered. Relevant concelling performed. Office follow up in a month after procedures.

## 2022-03-31 NOTE — PATIENT INSTRUCTIONS
CORONARY ARTERY DISEASE:Yes   clinically stable. Patient is on optimal medical regimen ( see medication list above )  - Patient is currently  asymptomatic from CAD.          - changes in  treatment:   no, ON asa & Coreg           - Testing ordered:  no  [place] classification: 1  CARDIOLITE  4/5/2021    Large apical MI, involving adelso apical & Infero apical portions. No    reversible ischemia    Apical Hypokinesis with preserved LV function LVEF is 53 %     HTN: Well controlled, on Losartan & Coreg   CARDIOMYOPATHY:  known   CONGESTIVE HEART FAILURE:  KNOWN HISTORY. Compensated.      VHD: No significant VHD noted  ECHO 4/5/2020   Left ventricular function is low normal, EF is estimated at 40-45%.   Mild left ventricular hypertrophy.  E/A reversal; indeterminate diastolic function.   Hypokinesis of apical septum,apical cap,apical lateral wall.   Mild mitral and tricuspid regurgitation is present.  RVSP is 22 mmHg.   No evidence of pericardial effusion. DYSLIPIDEMIA: Patient's profile is at / near [facility] is low                                Tolerating current medical regimen wellyes,  takes Lipitor                                                            See most recent Lab values in Labs section above. CVI: Patient has symptomatic, clinically C4 venous disease. Patient to continue to wear compression stockings. 10/2021    No evidence of DVT or SVT in the bilateral common femoral vein, femoral    vein, popliteal vein, greater saphenous vein or small saphenous vein.    Significant reflux noted of the Right CFV (1.4s) and GSV SFJ (0.6s).  Significant reflux noted in the Left GSV Mid Thigh (0.5s).      Mural thrombus seen in 2016 is not described in last 2 Echos.     ARRHYTHMIAS: known H/O SVT S/P Ablation    TESTS ORDERED: Serial ablations of both GSVs     PREVIOUSLY ORDERED TESTS REVIEWED & DISCUSSED WITH THE PATIENT:     I personally reviewed & interpreted, all previously ordered tests as copied above. Latest Labs are pulled in to the note with dates. Labs, specially in Reference to Lipid profile, Cardiac testing in the form of Echo ( dated: ), stress tests ( dated: ) & other relevant cardiac testing reviewed with patient & recommendations made based on assessment of the results. Discussed role of Cardiac risk factors & effects + treatment of co morbidities with patient & advised accordingly. MEDICATIONS: List of medications patient is currently taking is reviewed in detail with the patient. Discussed any side effects or problems taking the medication. Recommend Continue present management & medications as listed. AFFIRMATION: I  Review patient's history, previous & current medical problems & all Labs + testing. This includes chart prep even prior to the vosit. Various goals are discussed and multiple questions answered. Relevant concelling performed. Office follow up in a month after procedures.

## 2022-04-01 ENCOUNTER — OFFICE VISIT (OUTPATIENT)
Dept: FAMILY MEDICINE CLINIC | Age: 62
End: 2022-04-01
Payer: MEDICAID

## 2022-04-01 ENCOUNTER — TELEPHONE (OUTPATIENT)
Dept: CARDIOLOGY CLINIC | Age: 62
End: 2022-04-01

## 2022-04-01 VITALS
DIASTOLIC BLOOD PRESSURE: 82 MMHG | SYSTOLIC BLOOD PRESSURE: 132 MMHG | HEART RATE: 88 BPM | TEMPERATURE: 96.8 F | OXYGEN SATURATION: 98 % | BODY MASS INDEX: 31.21 KG/M2 | WEIGHT: 181.8 LBS

## 2022-04-01 DIAGNOSIS — E03.4 HYPOTHYROIDISM DUE TO ACQUIRED ATROPHY OF THYROID: Primary | ICD-10-CM

## 2022-04-01 DIAGNOSIS — Z13.1 SCREENING FOR DIABETES MELLITUS: ICD-10-CM

## 2022-04-01 DIAGNOSIS — N32.81 OAB (OVERACTIVE BLADDER): ICD-10-CM

## 2022-04-01 DIAGNOSIS — I25.110 CORONARY ARTERY DISEASE INVOLVING NATIVE CORONARY ARTERY OF NATIVE HEART WITH UNSTABLE ANGINA PECTORIS (HCC): ICD-10-CM

## 2022-04-01 DIAGNOSIS — J30.89 PERENNIAL ALLERGIC RHINITIS: ICD-10-CM

## 2022-04-01 DIAGNOSIS — I47.1 SVT (SUPRAVENTRICULAR TACHYCARDIA) (HCC): ICD-10-CM

## 2022-04-01 DIAGNOSIS — F17.200 TOBACCO DEPENDENCE: ICD-10-CM

## 2022-04-01 DIAGNOSIS — I21.4 NSTEMI (NON-ST ELEVATED MYOCARDIAL INFARCTION) (HCC): ICD-10-CM

## 2022-04-01 DIAGNOSIS — H69.83 ETD (EUSTACHIAN TUBE DYSFUNCTION), BILATERAL: ICD-10-CM

## 2022-04-01 DIAGNOSIS — T78.40XA ALLERGIC REACTION, INITIAL ENCOUNTER: ICD-10-CM

## 2022-04-01 DIAGNOSIS — J44.9 CHRONIC OBSTRUCTIVE PULMONARY DISEASE, UNSPECIFIED COPD TYPE (HCC): ICD-10-CM

## 2022-04-01 DIAGNOSIS — E78.5 DYSLIPIDEMIA: ICD-10-CM

## 2022-04-01 PROCEDURE — G8428 CUR MEDS NOT DOCUMENT: HCPCS | Performed by: FAMILY MEDICINE

## 2022-04-01 PROCEDURE — 99214 OFFICE O/P EST MOD 30 MIN: CPT | Performed by: FAMILY MEDICINE

## 2022-04-01 PROCEDURE — G8417 CALC BMI ABV UP PARAM F/U: HCPCS | Performed by: FAMILY MEDICINE

## 2022-04-01 PROCEDURE — 3023F SPIROM DOC REV: CPT | Performed by: FAMILY MEDICINE

## 2022-04-01 PROCEDURE — 4004F PT TOBACCO SCREEN RCVD TLK: CPT | Performed by: FAMILY MEDICINE

## 2022-04-01 PROCEDURE — 3017F COLORECTAL CA SCREEN DOC REV: CPT | Performed by: FAMILY MEDICINE

## 2022-04-01 RX ORDER — ONDANSETRON 4 MG/1
4 TABLET, ORALLY DISINTEGRATING ORAL 3 TIMES DAILY PRN
Qty: 30 TABLET | Refills: 1 | Status: SHIPPED | OUTPATIENT
Start: 2022-04-01

## 2022-04-01 RX ORDER — CARVEDILOL 12.5 MG/1
12.5 TABLET ORAL 2 TIMES DAILY
Qty: 60 TABLET | Refills: 5 | Status: SHIPPED | OUTPATIENT
Start: 2022-04-01 | End: 2022-10-12 | Stop reason: SDUPTHER

## 2022-04-01 RX ORDER — SOLIFENACIN SUCCINATE 10 MG/1
10 TABLET, FILM COATED ORAL DAILY
Qty: 30 TABLET | Refills: 5 | Status: SHIPPED | OUTPATIENT
Start: 2022-04-01 | End: 2022-10-06 | Stop reason: SDUPTHER

## 2022-04-01 RX ORDER — OMEGA-3S/DHA/EPA/FISH OIL/D3 300MG-1000
400 CAPSULE ORAL DAILY
Qty: 30 TABLET | Refills: 5 | Status: SHIPPED | OUTPATIENT
Start: 2022-04-01 | End: 2022-10-06 | Stop reason: SDUPTHER

## 2022-04-01 RX ORDER — LEVOTHYROXINE SODIUM 88 UG/1
TABLET ORAL
Qty: 30 TABLET | Refills: 5 | Status: SHIPPED | OUTPATIENT
Start: 2022-04-01 | End: 2022-10-06 | Stop reason: SDUPTHER

## 2022-04-01 RX ORDER — FLUTICASONE PROPIONATE 220 UG/1
AEROSOL, METERED RESPIRATORY (INHALATION)
Qty: 3 EACH | Refills: 1 | Status: SHIPPED | OUTPATIENT
Start: 2022-04-01 | End: 2022-09-28

## 2022-04-01 RX ORDER — ATORVASTATIN CALCIUM 40 MG/1
40 TABLET, FILM COATED ORAL DAILY
Qty: 30 TABLET | Refills: 5 | Status: SHIPPED | OUTPATIENT
Start: 2022-04-01 | End: 2022-10-12 | Stop reason: SDUPTHER

## 2022-04-01 RX ORDER — PREDNISONE 20 MG/1
40 TABLET ORAL DAILY
Qty: 10 TABLET | Refills: 0 | Status: SHIPPED | OUTPATIENT
Start: 2022-04-01 | End: 2022-10-06

## 2022-04-01 RX ORDER — FLUTICASONE PROPIONATE 50 MCG
SPRAY, SUSPENSION (ML) NASAL
Qty: 16 G | Refills: 5 | Status: SHIPPED | OUTPATIENT
Start: 2022-04-01 | End: 2022-10-06 | Stop reason: SDUPTHER

## 2022-04-01 ASSESSMENT — PATIENT HEALTH QUESTIONNAIRE - PHQ9
SUM OF ALL RESPONSES TO PHQ9 QUESTIONS 1 & 2: 0
SUM OF ALL RESPONSES TO PHQ QUESTIONS 1-9: 0
2. FEELING DOWN, DEPRESSED OR HOPELESS: 0
1. LITTLE INTEREST OR PLEASURE IN DOING THINGS: 0
SUM OF ALL RESPONSES TO PHQ QUESTIONS 1-9: 0

## 2022-04-01 NOTE — PROGRESS NOTES
SUBJECTIVE: Karena Viramontes is a 63 y.o. female here for follow up of hypothyroidism. Scheduled Meds: levothyroxine 88 mcg daily    Lab Results   Component Value Date    TSH 2.430 10/01/2021     Thyroid ROS: denies fatigue, weight changes, heat/cold intolerance, bowel/skin changes or CVS symptoms. Obesity:  Has been on metformin 500 mg daily, but is isn't helping. Hyperlipidemia: Patient presents with hyperlipidemia. She was tested because screening. Her last labs showed Total cholesterol of 174, HDL 37, ,  Triglycerides 162. There is not a family history of hyperlipidemia. There is not a family history of early ischemia heart disease. OAB on myrbetriq, has good control. COPD: Patient complains of dyspnea. Symptoms began several years ago. Symptoms chronic dyspnea does worsen with exertion. Sputum is absent. Fever has been absent. Patient uses 1 pillows at night. Patient can walk 500 feet before resting. Patient currently is not on home oxygen therapy. [place] Respiratory history: COPD    Decreased hearing in ears. Daughter states that she has increased wax. OBJECTIVE:   Vitals:    04/01/22 1301   BP: 132/82   Pulse: 88   Temp: 96.8 °F (36 °C)   SpO2: 98%     No acute distress. Alert and Oriented x 3  HEENT: Atraumatic. Normocephalic. PERRLA, EOMI, Conjunctiva clear  Oropharynx clear, mucosa moist.  Nasal mucosa normal  Bilateral TMs retracted. No cerumen in external auditory canal  NECK: without thyromegaly, lymphadenopathy, JVD  LUNGS:Clear to ascultation bilaterally. Breathing comfortably  CARDIOVASCULAR:  Regular rate and rhythm, no murmurs, rubs, or gallops  EXTREMITY: Full range of motion. No clubbing/cyanosis/edema  NEURO: Cranial nerves II-XII grossly intact. Strength 5/5, DTR 2/4. SKIN: Warm, Dry, No rash. PSYCH: Mood and Affect normal.      ASSESSMENT:     Diagnosis Orders   1.  Hypothyroidism due to acquired atrophy of thyroid     Well-controlled levothyroxine (SYNTHROID) 88 MCG tablet   2. OAB (overactive bladder)   Controlled solifenacin (VESICARE) 10 MG tablet   3. NSTEMI (non-ST elevated myocardial infarction) ([facility])   Asymptomatic atorvastatin (LIPITOR) 40 MG tablet   4. Coronary artery disease involving native coronary artery of native heart with unstable angina pectoris ([facility])  atorvastatin (LIPITOR) 40 MG tablet    carvedilol (COREG) 12.5 MG tablet   5. Dyslipidemia  atorvastatin (LIPITOR) 40 MG tablet   Asymptomatic Comprehensive Metabolic Panel        7. SVT (supraventricular tachycardia) ([facility])   Controlled carvedilol (COREG) 12.5 MG tablet   8. Chronic obstructive pulmonary disease, unspecified COPD type ([facility])   Controlled fluticasone (FLOVENT HFA) 220 MCG/ACT inhaler   9. Perennial allergic rhinitis  fluticasone (FLONASE) 50 MCG/ACT nasal spray   10. Tobacco dependence  ondansetron (ZOFRAN-ODT) 4 MG disintegrating tablet   11. Screening for diabetes mellitus  Comprehensive Metabolic Panel   12. ETD (Eustachian tube dysfunction), bilateral  predniSONE (DELTASONE) 20 MG tablet   Needs improvement      PLAN: We will treat eustachian tube dysfunction with prednisone. Patient is already using Flonase. Continue all meds at current dose  orders as documented in EMR  need for compliance with treatment plan to achieve optimal outcome discussed  reviewed medications and side effects in detail  reviewed potential future medication changes and possible side effects thereof  return to clinic in 6 months.

## 2022-04-01 NOTE — PATIENT INSTRUCTIONS
Patient Education        Eustachian Tube Problems: Care Instructions  Overview     The eustachian (say \"you-STAY-shee-un\") tubes run between the inside of the ears and the throat. They keep air pressure stable in the ears. If your eustachian tubes become blocked, the air pressure in your ears changes. The fluids from a cold can clog eustachian tubes, causing pain in the ears. A quick change in air pressure can cause eustachian tubes to close up. This might happen when an airplane changes altitude or when a  goes up or downunderwater. Eustachian tube problems often clear up on their own or after treating the cause of the blockage. If your tubes continue to be blocked, you may needsurgery. Follow-up care is a key part of your treatment and safety. Be sure to make and go to all appointments, and call your doctor if you are having problems. It's also a good idea to know your test results and keep alist of the medicines you take. How can you care for yourself at home?  Try a simple exercise to help open blocked tubes. Close your mouth, hold your nose, and gently blow as if you are blowing your nose. Yawning and chewing gum also may help. You may hear or feel a \"pop\" when the tubes open.  To ease ear pain, apply a warm washcloth or a heating pad set on low. There may be some drainage from the ear when the heat melts earwax. Put a cloth between the heat source and your skin.  If your doctor prescribed antibiotics, take them as directed. Do not stop taking them just because you feel better. You need to take the full course of antibiotics.  Be safe with medicines. Depending on the cause of the problem, your doctor may recommend over-the-counter medicine. For example, adults may try decongestants for cold symptoms or nasal spray steroids for allergies. Follow the instructions carefully.  Be careful with cough and cold medicines.  Don't give them to children younger than 6, because they don't work for children that age and can even be harmful. For children 6 and older, always follow all the instructions carefully. Make sure you know how much medicine to give and how long to use it. And use the dosing device if one is included. When should you call for help? Call your doctor now or seek immediate medical care if:     You develop sudden, complete hearing loss.      You have severe pain or feel dizzy.      You have new or increasing pus or blood draining from your ear.      You have redness, swelling, or pain around or behind the ear. Watch closely for changes in your health, and be sure to contact your doctor if:     You do not get better after 2 weeks.      You have any new symptoms, such as itching or a feeling of fullness in the ear. Where can you learn more? Go to [email or web address] org and sign in to your [employer] account. Enter Y822 in the [employer] box to learn more about \"Eustachian Tube Problems: Care Instructions. \"     If you do not have an account, please click on the \"Sign Up Now\" link. Current as of: September 8, 2021               Content Version: 13.2  © 4107-7363 Healthwise, Incorporated. Care instructions adapted under license by [facility] ([facility]). If you have questions about a medical condition or this instruction, always ask your healthcare professional. [address] any warranty or liability for your use of this information.

## 2022-04-02 LAB
ALBUMIN/GLOBULIN RATIO: 1.8 RATIO (ref 0.8–2.6)
ALBUMIN: 4.3 G/DL (ref 3.5–5.2)
ALP BLD-CCNC: 121 U/L (ref 23–144)
ALT SERPL-CCNC: 15 U/L (ref 0–60)
AST SERPL-CCNC: 19 U/L (ref 0–55)
BILIRUB SERPL-MCNC: 0.2 MG/DL (ref 0–1.2)
BUN BLDV-MCNC: 6 MG/DL (ref 3–29)
BUN/CREAT BLD: 5 (ref 7–25)
CALCIUM SERPL-MCNC: 10.1 MG/DL (ref 8.5–10.5)
CHLORIDE BLD-SCNC: 102 MEQ/L (ref 96–110)
CO2: 26 MEQ/L (ref 19–32)
CREAT SERPL-MCNC: 1.1 MG/DL (ref 0.5–1.2)
GLOBULIN: 2.4 G/DL (ref 1.9–3.6)
GLOMERULAR FILTRATION RATE: 58 MLS/MIN/1.73M2
GLUCOSE BLD-MCNC: 123 MG/DL (ref 70–99)
POTASSIUM SERPL-SCNC: 4.1 MEQ/L (ref 3.4–5.3)
SODIUM BLD-SCNC: 139 MEQ/L (ref 135–148)
STATUS: ABNORMAL
TOTAL PROTEIN: 6.7 G/DL (ref 6–8.3)

## 2022-04-06 ENCOUNTER — TELEPHONE (OUTPATIENT)
Dept: FAMILY MEDICINE CLINIC | Age: 62
End: 2022-04-06

## 2022-04-06 NOTE — TELEPHONE ENCOUNTER
Taking her NSAIDs for a short period of time after surgery is okay.   The best she can do is take all her medications as prescribed

## 2022-04-06 NOTE — TELEPHONE ENCOUNTER
Patient called stating her kidney function is low. She would like to know what can be done because she has not been taking any NSAIDs and is staying hydrated. She stated she has reconstructive surgery next week and will have to take NSAIDs for the pain. She would like to know what she can do about the kidney function.

## 2022-05-03 ENCOUNTER — TELEPHONE (OUTPATIENT)
Dept: CARDIOLOGY CLINIC | Age: 62
End: 2022-05-03

## 2022-05-13 ENCOUNTER — TELEPHONE (OUTPATIENT)
Dept: CARDIOLOGY CLINIC | Age: 62
End: 2022-05-13

## 2022-07-29 ENCOUNTER — TELEPHONE (OUTPATIENT)
Dept: CARDIOLOGY CLINIC | Age: 62
End: 2022-07-29

## 2022-07-29 NOTE — TELEPHONE ENCOUNTER
Called to check up on the paitnet and see if she still wants her ablation.  And she does just she is still healing from her surgery and asked if we can call back 9/2022 and hopefully she will be able to drive and be healed up

## 2022-09-19 ENCOUNTER — TELEPHONE (OUTPATIENT)
Dept: CARDIOLOGY CLINIC | Age: 62
End: 2022-09-19

## 2022-09-19 NOTE — TELEPHONE ENCOUNTER
Called patient to r/s because Shruthi Merino is doing ablations and unable to see her at her scheduled time. Wanted to r/s to Oct or Nov but patient no sure if he wanted to see her before the ablation or not. Please call patient to advise.

## 2022-09-28 DIAGNOSIS — J44.9 CHRONIC OBSTRUCTIVE PULMONARY DISEASE, UNSPECIFIED COPD TYPE (HCC): ICD-10-CM

## 2022-09-28 RX ORDER — FLUTICASONE PROPIONATE 220 UG/1
AEROSOL, METERED RESPIRATORY (INHALATION)
Qty: 12 G | Refills: 0 | Status: SHIPPED | OUTPATIENT
Start: 2022-09-28 | End: 2022-10-06 | Stop reason: SDUPTHER

## 2022-10-01 DIAGNOSIS — N32.81 OAB (OVERACTIVE BLADDER): ICD-10-CM

## 2022-10-01 DIAGNOSIS — I47.1 SVT (SUPRAVENTRICULAR TACHYCARDIA) (HCC): ICD-10-CM

## 2022-10-01 DIAGNOSIS — E03.4 HYPOTHYROIDISM DUE TO ACQUIRED ATROPHY OF THYROID: ICD-10-CM

## 2022-10-01 DIAGNOSIS — I21.4 NSTEMI (NON-ST ELEVATED MYOCARDIAL INFARCTION) (HCC): ICD-10-CM

## 2022-10-01 DIAGNOSIS — I25.110 CORONARY ARTERY DISEASE INVOLVING NATIVE CORONARY ARTERY OF NATIVE HEART WITH UNSTABLE ANGINA PECTORIS (HCC): ICD-10-CM

## 2022-10-01 DIAGNOSIS — E78.5 DYSLIPIDEMIA: ICD-10-CM

## 2022-10-03 ENCOUNTER — TELEPHONE (OUTPATIENT)
Dept: CARDIOLOGY CLINIC | Age: 62
End: 2022-10-03

## 2022-10-03 RX ORDER — CARVEDILOL 12.5 MG/1
TABLET ORAL
Qty: 60 TABLET | Refills: 5 | OUTPATIENT
Start: 2022-10-03

## 2022-10-03 RX ORDER — LEVOTHYROXINE SODIUM 88 UG/1
TABLET ORAL
Qty: 30 TABLET | Refills: 5 | OUTPATIENT
Start: 2022-10-03

## 2022-10-03 RX ORDER — SOLIFENACIN SUCCINATE 10 MG/1
TABLET, FILM COATED ORAL
Qty: 30 TABLET | Refills: 5 | OUTPATIENT
Start: 2022-10-03

## 2022-10-03 RX ORDER — ATORVASTATIN CALCIUM 40 MG/1
TABLET, FILM COATED ORAL
Qty: 30 TABLET | Refills: 5 | OUTPATIENT
Start: 2022-10-03

## 2022-10-03 NOTE — TELEPHONE ENCOUNTER
I called the patient back. Patient needed to cancel ablation because she wanted to talk to Savage Santana before the ablations to calm her nerves.

## 2022-10-06 ENCOUNTER — OFFICE VISIT (OUTPATIENT)
Dept: FAMILY MEDICINE CLINIC | Age: 62
End: 2022-10-06
Payer: MEDICAID

## 2022-10-06 VITALS
HEIGHT: 64 IN | WEIGHT: 177.6 LBS | SYSTOLIC BLOOD PRESSURE: 114 MMHG | OXYGEN SATURATION: 97 % | TEMPERATURE: 96.8 F | HEART RATE: 104 BPM | DIASTOLIC BLOOD PRESSURE: 70 MMHG | BODY MASS INDEX: 30.32 KG/M2

## 2022-10-06 DIAGNOSIS — E78.5 DYSLIPIDEMIA: ICD-10-CM

## 2022-10-06 DIAGNOSIS — J44.9 CHRONIC OBSTRUCTIVE PULMONARY DISEASE, UNSPECIFIED COPD TYPE (HCC): ICD-10-CM

## 2022-10-06 DIAGNOSIS — R73.09 ELEVATED GLUCOSE LEVEL: ICD-10-CM

## 2022-10-06 DIAGNOSIS — J30.89 PERENNIAL ALLERGIC RHINITIS: ICD-10-CM

## 2022-10-06 DIAGNOSIS — E03.4 HYPOTHYROIDISM DUE TO ACQUIRED ATROPHY OF THYROID: Primary | ICD-10-CM

## 2022-10-06 DIAGNOSIS — Z12.31 BREAST CANCER SCREENING BY MAMMOGRAM: ICD-10-CM

## 2022-10-06 DIAGNOSIS — N32.81 OAB (OVERACTIVE BLADDER): ICD-10-CM

## 2022-10-06 PROBLEM — M20.41 HAMMERTOE OF RIGHT FOOT: Status: ACTIVE | Noted: 2022-04-15

## 2022-10-06 PROBLEM — M20.11 HALLUX VALGUS (ACQUIRED), RIGHT FOOT: Status: ACTIVE | Noted: 2022-04-15

## 2022-10-06 PROCEDURE — 3017F COLORECTAL CA SCREEN DOC REV: CPT | Performed by: FAMILY MEDICINE

## 2022-10-06 PROCEDURE — 99214 OFFICE O/P EST MOD 30 MIN: CPT | Performed by: FAMILY MEDICINE

## 2022-10-06 PROCEDURE — G8417 CALC BMI ABV UP PARAM F/U: HCPCS | Performed by: FAMILY MEDICINE

## 2022-10-06 PROCEDURE — 4004F PT TOBACCO SCREEN RCVD TLK: CPT | Performed by: FAMILY MEDICINE

## 2022-10-06 PROCEDURE — 90694 VACC AIIV4 NO PRSRV 0.5ML IM: CPT | Performed by: FAMILY MEDICINE

## 2022-10-06 PROCEDURE — 90471 IMMUNIZATION ADMIN: CPT | Performed by: FAMILY MEDICINE

## 2022-10-06 PROCEDURE — 3023F SPIROM DOC REV: CPT | Performed by: FAMILY MEDICINE

## 2022-10-06 PROCEDURE — G8484 FLU IMMUNIZE NO ADMIN: HCPCS | Performed by: FAMILY MEDICINE

## 2022-10-06 PROCEDURE — G8428 CUR MEDS NOT DOCUMENT: HCPCS | Performed by: FAMILY MEDICINE

## 2022-10-06 RX ORDER — OMEGA-3S/DHA/EPA/FISH OIL/D3 300MG-1000
400 CAPSULE ORAL DAILY
Qty: 30 TABLET | Refills: 5 | Status: SHIPPED | OUTPATIENT
Start: 2022-10-06

## 2022-10-06 RX ORDER — LEVOTHYROXINE SODIUM 88 UG/1
TABLET ORAL
Qty: 30 TABLET | Refills: 5 | Status: SHIPPED | OUTPATIENT
Start: 2022-10-06

## 2022-10-06 RX ORDER — FLUTICASONE PROPIONATE 220 UG/1
AEROSOL, METERED RESPIRATORY (INHALATION)
Qty: 12 G | Refills: 5 | Status: SHIPPED | OUTPATIENT
Start: 2022-10-06

## 2022-10-06 RX ORDER — GABAPENTIN 100 MG/1
CAPSULE ORAL
COMMUNITY
Start: 2022-09-15

## 2022-10-06 RX ORDER — FLUTICASONE PROPIONATE 50 MCG
SPRAY, SUSPENSION (ML) NASAL
Qty: 16 G | Refills: 5 | Status: SHIPPED | OUTPATIENT
Start: 2022-10-06

## 2022-10-06 RX ORDER — SOLIFENACIN SUCCINATE 10 MG/1
10 TABLET, FILM COATED ORAL DAILY
Qty: 30 TABLET | Refills: 5 | Status: SHIPPED | OUTPATIENT
Start: 2022-10-06

## 2022-10-06 RX ORDER — IBUPROFEN 200 MG
1200 CAPSULE ORAL 2 TIMES DAILY WITH MEALS
COMMUNITY

## 2022-10-06 ASSESSMENT — PATIENT HEALTH QUESTIONNAIRE - PHQ9
SUM OF ALL RESPONSES TO PHQ QUESTIONS 1-9: 0
5. POOR APPETITE OR OVEREATING: 0
9. THOUGHTS THAT YOU WOULD BE BETTER OFF DEAD, OR OF HURTING YOURSELF: 0
1. LITTLE INTEREST OR PLEASURE IN DOING THINGS: 0
SUM OF ALL RESPONSES TO PHQ QUESTIONS 1-9: 0
7. TROUBLE CONCENTRATING ON THINGS, SUCH AS READING THE NEWSPAPER OR WATCHING TELEVISION: 0
6. FEELING BAD ABOUT YOURSELF - OR THAT YOU ARE A FAILURE OR HAVE LET YOURSELF OR YOUR FAMILY DOWN: 0
SUM OF ALL RESPONSES TO PHQ QUESTIONS 1-9: 0
2. FEELING DOWN, DEPRESSED OR HOPELESS: 0
SUM OF ALL RESPONSES TO PHQ9 QUESTIONS 1 & 2: 0
4. FEELING TIRED OR HAVING LITTLE ENERGY: 0
10. IF YOU CHECKED OFF ANY PROBLEMS, HOW DIFFICULT HAVE THESE PROBLEMS MADE IT FOR YOU TO DO YOUR WORK, TAKE CARE OF THINGS AT HOME, OR GET ALONG WITH OTHER PEOPLE: 0
3. TROUBLE FALLING OR STAYING ASLEEP: 0
SUM OF ALL RESPONSES TO PHQ QUESTIONS 1-9: 0
8. MOVING OR SPEAKING SO SLOWLY THAT OTHER PEOPLE COULD HAVE NOTICED. OR THE OPPOSITE, BEING SO FIGETY OR RESTLESS THAT YOU HAVE BEEN MOVING AROUND A LOT MORE THAN USUAL: 0

## 2022-10-06 NOTE — PROGRESS NOTES
SUBJECTIVE: Annamaria Barthel is a 63 y.o. female here for follow up of hypothyroidism. Scheduled Meds: levothyroxine 88 mcg daily    Lab Results   Component Value Date    TSH 2.430 10/01/2021     Thyroid ROS: denies fatigue, weight changes, heat/cold intolerance, bowel/skin changes or CVS symptoms. Hyperlipidemia: Patient presents with hyperlipidemia. She was tested because screening. Her last labs showed Total cholesterol of 174, HDL 37, ,  Triglycerides 162. There is not a family history of hyperlipidemia. There is not a family history of early ischemia heart disease. OAB on myrbetriq, has good control. COPD: Patient complains of dyspnea. Symptoms began several years ago. Symptoms chronic dyspnea does worsen with exertion. Sputum is  absent . Fever has been  absent . Patient uses 1 pillows at night. Patient can walk 500 feet before resting. Patient currently is not on home oxygen therapy. Sallyanne Chain Respiratory history: COPD    Patient recently had bunion surgery and is having a very slow recovery. She states she had her surgery 6 months ago and she is just now able to start driving. Her insurance no longer covers her podiatrist so she is changing podiatrist.    OBJECTIVE:   Vitals:    10/06/22 1305   BP: 114/70   Pulse: (!) 104   Temp: 96.8 °F (36 °C)   SpO2: 97%     No acute distress. Alert and Oriented x 3  HEENT: Atraumatic. Normocephalic. PERRLA, EOMI, Conjunctiva clear  Oropharynx clear, mucosa moist.  Nasal mucosa normal  Bilateral TMs retracted. No cerumen in external auditory canal  NECK: without thyromegaly, lymphadenopathy, JVD  LUNGS:Clear to ascultation bilaterally. Breathing comfortably  CARDIOVASCULAR:  Regular rate and rhythm, no murmurs, rubs, or gallops  EXTREMITY: Full range of motion. No clubbing/cyanosis/edema  NEURO: Cranial nerves II-XII grossly intact. Strength 5/5, DTR 2/4. SKIN: Warm, Dry, No rash.   PSYCH: Mood and Affect normal.      ASSESSMENT:     Diagnosis Orders 1. Hypothyroidism due to acquired atrophy of thyroid  levothyroxine (SYNTHROID) 88 MCG tablet   Asymptomatic   2. Dyslipidemia  Comprehensive Metabolic Panel   Asymptomatic Lipid Panel      3. OAB (overactive bladder)  solifenacin (VESICARE) 10 MG tablet   Controlled   4. Chronic obstructive pulmonary disease, unspecified COPD type (HCC)  fluticasone (FLOVENT HFA) 220 MCG/ACT inhaler   Controlled   5. Breast cancer screening by mammogram  [facility] DIGITAL SCREEN W OR WO CAD BILATERAL      6. Perennial allergic rhinitis  fluticasone (FLONASE) 50 MCG/ACT nasal spray   Controlled   7. Elevated glucose level  Hemoglobin A1C            PLAN:   Continue all meds at current dose due to their effectiveness. Mammogram ordered. orders as documented in EMR  need for compliance with treatment plan to achieve optimal outcome discussed  reviewed medications and side effects in detail  reviewed potential future medication changes and possible side effects thereof  return to clinic in 6 months.

## 2022-10-07 LAB
ALBUMIN/GLOBULIN RATIO: 1.4 RATIO (ref 0.8–2.6)
ALBUMIN: 4.2 G/DL (ref 3.5–5.2)
ALP BLD-CCNC: 137 U/L (ref 23–144)
ALT SERPL-CCNC: 11 U/L (ref 0–60)
AST SERPL-CCNC: 18 U/L (ref 0–55)
BILIRUB SERPL-MCNC: 0.3 MG/DL (ref 0–1.2)
BUN BLDV-MCNC: 7 MG/DL (ref 3–29)
BUN/CREAT BLD: 6 (ref 7–25)
CALCIUM SERPL-MCNC: 9.9 MG/DL (ref 8.5–10.5)
CHLORIDE BLD-SCNC: 96 MEQ/L (ref 96–110)
CHOLESTEROL: 193 MG/DL
CO2: 24 MEQ/L (ref 19–32)
CREAT SERPL-MCNC: 1.1 MG/DL (ref 0.5–1.2)
GLOBULIN: 2.9 G/DL (ref 1.9–3.6)
GLOMERULAR FILTRATION RATE: 58 MLS/MIN/1.73M2
GLUCOSE BLD-MCNC: 104 MG/DL (ref 70–99)
HBA1C MFR BLD: 5.6 % (ref 4–6)
HDLC SERPL-MCNC: 59 MG/DL
LDL CHOLESTEROL CALCULATED: 104 MG/DL
POTASSIUM SERPL-SCNC: 4 MEQ/L (ref 3.4–5.3)
SODIUM BLD-SCNC: 133 MEQ/L (ref 135–148)
STATUS: ABNORMAL
TOTAL PROTEIN: 7.1 G/DL (ref 6–8.3)
TRIGL SERPL-MCNC: 150 MG/DL
TSH SERPL DL<=0.05 MIU/L-ACNC: 1.29 MCIU/ML (ref 0.4–4.5)
VLDLC SERPL CALC-MCNC: 30 MG/DL (ref 4–38)

## 2022-10-12 ENCOUNTER — OFFICE VISIT (OUTPATIENT)
Dept: CARDIOLOGY CLINIC | Age: 62
End: 2022-10-12
Payer: MEDICAID

## 2022-10-12 VITALS
DIASTOLIC BLOOD PRESSURE: 64 MMHG | WEIGHT: 179.6 LBS | BODY MASS INDEX: 30.66 KG/M2 | HEART RATE: 84 BPM | SYSTOLIC BLOOD PRESSURE: 118 MMHG | HEIGHT: 64 IN

## 2022-10-12 DIAGNOSIS — Z98.890 S/P ABLATION OF ACCESSORY BYPASS TRACT: ICD-10-CM

## 2022-10-12 DIAGNOSIS — I21.4 NSTEMI (NON-ST ELEVATED MYOCARDIAL INFARCTION) (HCC): ICD-10-CM

## 2022-10-12 DIAGNOSIS — E78.5 DYSLIPIDEMIA: ICD-10-CM

## 2022-10-12 DIAGNOSIS — I47.1 SVT (SUPRAVENTRICULAR TACHYCARDIA) (HCC): ICD-10-CM

## 2022-10-12 DIAGNOSIS — I25.110 CORONARY ARTERY DISEASE INVOLVING NATIVE CORONARY ARTERY OF NATIVE HEART WITH UNSTABLE ANGINA PECTORIS (HCC): Primary | ICD-10-CM

## 2022-10-12 DIAGNOSIS — F17.200 TOBACCO USE DISORDER: ICD-10-CM

## 2022-10-12 DIAGNOSIS — I10 HYPERTENSION, ESSENTIAL, BENIGN: ICD-10-CM

## 2022-10-12 DIAGNOSIS — E78.2 MIXED HYPERLIPIDEMIA: ICD-10-CM

## 2022-10-12 DIAGNOSIS — I50.32 CHRONIC DIASTOLIC CONGESTIVE HEART FAILURE (HCC): ICD-10-CM

## 2022-10-12 PROCEDURE — G8417 CALC BMI ABV UP PARAM F/U: HCPCS | Performed by: INTERNAL MEDICINE

## 2022-10-12 PROCEDURE — G8427 DOCREV CUR MEDS BY ELIG CLIN: HCPCS | Performed by: INTERNAL MEDICINE

## 2022-10-12 PROCEDURE — 99213 OFFICE O/P EST LOW 20 MIN: CPT | Performed by: INTERNAL MEDICINE

## 2022-10-12 PROCEDURE — 3017F COLORECTAL CA SCREEN DOC REV: CPT | Performed by: INTERNAL MEDICINE

## 2022-10-12 PROCEDURE — 4004F PT TOBACCO SCREEN RCVD TLK: CPT | Performed by: INTERNAL MEDICINE

## 2022-10-12 PROCEDURE — G8484 FLU IMMUNIZE NO ADMIN: HCPCS | Performed by: INTERNAL MEDICINE

## 2022-10-12 RX ORDER — CARVEDILOL 12.5 MG/1
12.5 TABLET ORAL 2 TIMES DAILY
Qty: 180 TABLET | Refills: 3 | Status: SHIPPED | OUTPATIENT
Start: 2022-10-12

## 2022-10-12 RX ORDER — ASPIRIN 81 MG/1
81 TABLET ORAL DAILY
Qty: 90 TABLET | Refills: 3 | Status: SHIPPED | OUTPATIENT
Start: 2022-10-12

## 2022-10-12 RX ORDER — ATORVASTATIN CALCIUM 40 MG/1
40 TABLET, FILM COATED ORAL DAILY
Qty: 90 TABLET | Refills: 3 | Status: SHIPPED | OUTPATIENT
Start: 2022-10-12

## 2022-10-12 RX ORDER — LOSARTAN POTASSIUM 25 MG/1
TABLET ORAL
Qty: 30 TABLET | Refills: 5 | Status: SHIPPED | OUTPATIENT
Start: 2022-10-12

## 2022-10-12 NOTE — PROGRESS NOTES
OFFICE PROGRESS NOTES      Sanjuana Avila is a 63 y.o. female who has    CHIEF COMPLAINT AS FOLLOWS:  CHEST PAIN: Patient denies any C/O chest pains at this time. SOB: No C/O SOB at this time. LEG EDEMA: B/L Lower extremity edema is present but no change over previous. PALPITATIONS: Denies any C/O Palpitations   DIZZINESS: No C/O Dizziness   SYNCOPE: None   OTHER/ ADDITIONAL COMPLAINTS:                                     HPI: Patient is here for F/U on his CAD, CMP, HTN & Dyslipidemia problems. CAD: Patient has known CAD. Had angioplasty  in the past.  CMP: Patient has known  ischemic CMP. Being treated with guideline recommended medical / device therapy as stated below. CHF: Patient has known systolic (HFrEF) . Patient had been managed with medical regimen as stated below. HTN: Patient has known essential HTN. Has been treated with guideline recommended medical / physical/ diet therapy as stated below. Dyslipidemia: Patient has known mixed dyslipidemia. Has been treated with guideline recommended medical / physical/ diet therapy as stated below.                 Current Outpatient Medications   Medication Sig Dispense Refill    carvedilol (COREG) 12.5 MG tablet Take 1 tablet by mouth 2 times daily 180 tablet 3    atorvastatin (LIPITOR) 40 MG tablet Take 1 tablet by mouth daily 90 tablet 3    losartan (COZAAR) 25 MG tablet take 1 tablet by mouth once daily 30 tablet 5    aspirin EC 81 MG EC tablet Take 1 tablet by mouth daily 90 tablet 3    gabapentin (NEURONTIN) 100 MG capsule take 1-2 capsules by mouth 2 to 3 hours before BED      diclofenac sodium (VOLTAREN) 1 % GEL MASSAGE 4 GRAMS INTO RIGHT FOOD UP TO 4 TIMES DAILY      calcium carbonate (OYSTER SHELL CALCIUM 500 MG) 1250 (500 Ca) MG tablet Take 1,200 mg by mouth 2 times daily (with meals)      solifenacin (VESICARE) 10 MG tablet Take 1 tablet by mouth daily 30 tablet 5    levothyroxine (SYNTHROID) 88 MCG tablet take 1 tablet by mouth once daily 30 tablet 5    vitamin D3 (CHOLECALCIFEROL) 10 MCG (400 UNIT) TABS tablet Take 1 tablet by mouth daily 30 tablet 5    fluticasone (FLONASE) 50 MCG/ACT nasal spray instill 2 sprays into each nostril once daily 16 g 5    fluticasone (FLOVENT HFA) 220 MCG/ACT inhaler inhale 2 puffs by mouth and INTO THE LUNGS twice a day 12 g 5    ondansetron (ZOFRAN-ODT) 4 MG disintegrating tablet Take 1 tablet by mouth 3 times daily as needed for Nausea or Vomiting 30 tablet 1    EPINEPHrine (EPIPEN) 0.3 MG/0.3ML SOAJ injection   0    LORazepam (ATIVAN) 0.5 MG tablet every 6 hours as needed. 0    donepezil (ARICEPT) 10 MG tablet Take 20 mg by mouth nightly (Patient not taking: Reported on 10/12/2022)      amitriptyline (ELAVIL) 100 MG tablet Take 100 mg by mouth every evening (Patient not taking: Reported on 10/12/2022)  2    NAMZARIC 28-10 MG CP24 Take by mouth nightly  (Patient not taking: Reported on 10/12/2022)  2     No current facility-administered medications for this visit. Allergies: Lisinopril and Other  Review of Systems:    Constitutional: Negative for diaphoresis and fatigue  Respiratory: Negative for shortness of breath  Cardiovascular: Negative for chest pain, dyspnea on exertion, claudication, edema, irregular heartbeat, murmur, palpitations or shortness of breath  Musculoskeletal: Negative for muscle pain, muscular weakness, negative for pain in arm and leg or swelling in foot and leg    Objective:  /64   Pulse 84   Ht 5' 4\" (1.626 m)   Wt 179 lb 9.6 oz (81.5 kg)   BMI 30.83 kg/m²   Wt Readings from Last 3 Encounters:   10/12/22 179 lb 9.6 oz (81.5 kg)   10/06/22 177 lb 9.6 oz (80.6 kg)   04/01/22 181 lb 12.8 oz (82.5 kg)     Body mass index is 30.83 kg/m². GENERAL - Alert, oriented, pleasant, in no apparent distress. EYES: No jaundice, no conjunctival pallor. Neck - Supple. No jugular venous distention noted. No carotid bruits.    Cardiovascular - Normal S1 and S2 without obvious murmur or gallop. Extremities - No cyanosis, clubbing, or significant edema. Pulmonary - No respiratory distress. No wheezes or rales.       MEDICAL DECISION MAKING & DATA REVIEW:    Lab Review   Lab Results   Component Value Date/Time    TROPONINT <0.010 10/05/2017 08:52 AM    TROPONINT 0.940 04/17/2016 02:41 AM     Lab Results   Component Value Date/Time     06/09/2015 05:01 PM     05/27/2015 10:55 AM    PROBNP 375.5 01/11/2018 10:45 AM     Lab Results   Component Value Date    INR 0.98 02/09/2018    INR 1.03 01/03/2018     Lab Results   Component Value Date    LABA1C 5.6 10/06/2022    LABA1C 5.0 10/05/2017     Lab Results   Component Value Date    WBC 5.6 10/01/2020    WBC 6.4 02/20/2018    HCT 38.0 10/01/2020    HCT 39.8 05/17/2018    MCV 90.9 04/15/2022    MCV 89.7 10/01/2020     10/01/2020     05/17/2018     Lab Results   Component Value Date    CHOL 193 10/06/2022    CHOL 174 10/01/2021    TRIG 150 (H) 10/06/2022    TRIG 162 (H) 10/01/2021    HDL 59 10/06/2022    HDL 37 (L) 10/01/2021    LDLCALC 104 10/06/2022    LDLCALC 105 (H) 10/01/2021    LDLDIRECT 104 (H) 07/14/2015    LDLDIRECT 163 (H) 11/26/2014     Lab Results   Component Value Date    ALT 11 10/06/2022    ALT 15 04/01/2022    AST 18 10/06/2022    AST 19 04/01/2022     BMP:    Lab Results   Component Value Date/Time     10/06/2022 01:33 PM     04/01/2022 01:25 PM    K 4.0 10/06/2022 01:33 PM    K 4.1 04/01/2022 01:25 PM    CL 96 10/06/2022 01:33 PM     04/01/2022 01:25 PM    CO2 24 10/06/2022 01:33 PM    CO2 26 04/01/2022 01:25 PM    BUN 7 10/06/2022 01:33 PM    BUN 6 04/01/2022 01:25 PM    CREATININE 1.1 10/06/2022 01:33 PM    CREATININE 1.1 04/01/2022 01:25 PM     CMP:   Lab Results   Component Value Date/Time     10/06/2022 01:33 PM     04/01/2022 01:25 PM    K 4.0 10/06/2022 01:33 PM    K 4.1 04/01/2022 01:25 PM    CL 96 10/06/2022 01:33 PM     04/01/2022 01:25 PM    CO2 24 10/06/2022 01:33 PM    CO2 26 04/01/2022 01:25 PM    BUN 7 10/06/2022 01:33 PM    BUN 6 04/01/2022 01:25 PM    CREATININE 1.1 10/06/2022 01:33 PM    CREATININE 1.1 04/01/2022 01:25 PM    PROT 7.1 10/06/2022 01:33 PM    PROT 6.7 04/01/2022 01:25 PM     Lab Results   Component Value Date/Time    TSH 1.290 10/06/2022 01:33 PM    TSH 2.430 10/01/2021 01:28 PM    TSHHS 1.470 10/05/2017 08:52 AM    TSHHS 1.680 04/17/2016 01:41 PM       QUALITY MEASURES REVIEWED:  1.CAD:Patient is taking anti platelet agent:Yes  2. DYSLIPIDEMIA: Patient is on cholesterol lowering medication:Yes   3. Beta-Blocker therapy for CAD, if prior Myocardial Infarction:Yes   4. Counselled regarding smoking cessation. Yes   Patient does not Smoke. 5.Anticoagulation therapy (for A.Fib) No   Does Not have A.Fib.  6.Discussed weight management strategies. Assessment & Plan:  Primary / Secondary prevention is the goal by aggressive risk modification, healthy and therapeutic life style changes for cardiovascular risk reduction. CORONARY ARTERY DISEASE:Yes   clinically stable. Patient is on optimal medical regimen ( see medication list above )  - Patient is currently  asymptomatic from CAD. - changes in  treatment:   no, ON asa & Coreg           - Testing ordered:  no  [facility] classification: 1  CARDIOLITE  4/5/2021    Large apical MI, involving adelso apical & Infero apical portions. No    reversible ischemia    Apical Hypokinesis with preserved LV function LVEF is 53 %      HTN: Well controlled, on Losartan & Coreg   CARDIOMYOPATHY:  known   CONGESTIVE HEART FAILURE:  KNOWN HISTORY. Compensated. VHD: No significant VHD noted  ECHO 4/5/2021   Left ventricular function is low normal, EF is estimated at 40-45%. Mild left ventricular hypertrophy. E/A reversal; indeterminate diastolic function. Hypokinesis of apical septum,apical cap,apical lateral wall. Mild mitral and tricuspid regurgitation is present. RVSP is 22 mmHg.    No evidence of pericardial effusion. DYSLIPIDEMIA: Patient's profile is at / near Goal.yes,                                 HDL is low                                Tolerating current medical regimen wellyes,  takes Lipitor                                                            See most recent Lab values in Labs section above. CVI: Patient has symptomatic, clinically C4 venous disease. Patient to continue to wear compression stockings. 10/2021    No evidence of DVT or SVT in the bilateral common femoral vein, femoral    vein, popliteal vein, greater saphenous vein or small saphenous vein. Significant reflux noted of the Right CFV (1.4s) and GSV SFJ (0.6s). Significant reflux noted in the Left GSV Mid Thigh (0.5s). Mural thrombus seen in 2016 is not described in last 2 Echos. ARRHYTHMIAS: known H/O SVT S/P Ablation    TESTS ORDERED:none this visit     PREVIOUSLY ORDERED TESTS REVIEWED & DISCUSSED WITH THE PATIENT:     I personally reviewed & interpreted, all previously ordered tests as copied above. Latest Labs are pulled in to the note with dates. Labs, specially in Reference to Lipid profile, Cardiac testing in the form of Echo ( dated: ), stress tests ( dated: ) & other relevant cardiac testing reviewed with patient & recommendations made based on assessment of the results. Discussed role of Cardiac risk factors & effects + treatment of co morbidities with patient & advised accordingly. MEDICATIONS: List of medications patient is currently taking is reviewed in detail with the patient & family member present. Discussed any side effects or problems taking the medication. Recommend Continue present management & medications as listed. AFFIRMATION: I spent at least 20 minutes of time reviewing patient's history, previous & current medical problems & all Labs + testing. This includes chart prep even prior to the vosit. Various goals are discussed and multiple questions answered. Relevant chioma performed. Office follow up in six months.

## 2022-10-12 NOTE — LETTER
[address]  [address] [id number]  Phone: [phone]  Fax: [phone]    Vanessa Park MD    October 12, 2022     Beatriz Geiger MD  [address] [address]    Patient: Daryn Vaz   MR Number: [id number]   YOB: 1960   Date of Visit: 10/12/2022       Dear Beatriz Geiger:    Thank you for referring Yobani Batres to me for evaluation/treatment. Below are the relevant portions of my assessment and plan of care. If you have questions, please do not hesitate to call me. I look forward to following Jayson Thomas along with you.     Sincerely,      Vanessa Park MD

## 2022-10-12 NOTE — PATIENT INSTRUCTIONS
CORONARY ARTERY DISEASE:Yes   clinically stable. Patient is on optimal medical regimen ( see medication list above )  - Patient is currently  asymptomatic from CAD. - changes in  treatment:   no, ON asa & Coreg           - Testing ordered:  no  [facility] classification: 1  CARDIOLITE  4/5/2021    Large apical MI, involving adelso apical & Infero apical portions. No    reversible ischemia    Apical Hypokinesis with preserved LV function LVEF is 53 %      HTN: Well controlled, on Losartan & Coreg   CARDIOMYOPATHY:  known   CONGESTIVE HEART FAILURE:  KNOWN HISTORY. Compensated. VHD: No significant VHD noted  ECHO 4/5/2021   Left ventricular function is low normal, EF is estimated at 40-45%. Mild left ventricular hypertrophy. E/A reversal; indeterminate diastolic function. Hypokinesis of apical septum,apical cap,apical lateral wall. Mild mitral and tricuspid regurgitation is present. RVSP is 22 mmHg. No evidence of pericardial effusion. DYSLIPIDEMIA: Patient's profile is at / near Goal.yes,                                 HDL is low                                Tolerating current medical regimen wellyes,  takes Lipitor                                                            See most recent Lab values in Labs section above. CVI: Patient has symptomatic, clinically C4 venous disease. Patient to continue to wear compression stockings. 10/2021    No evidence of DVT or SVT in the bilateral common femoral vein, femoral    vein, popliteal vein, greater saphenous vein or small saphenous vein. Significant reflux noted of the Right CFV (1.4s) and GSV SFJ (0.6s). Significant reflux noted in the Left GSV Mid Thigh (0.5s). Mural thrombus seen in 2016 is not described in last 2 Echos.       ARRHYTHMIAS: known H/O SVT S/P Ablation    TESTS ORDERED:none this visit     PREVIOUSLY ORDERED TESTS REVIEWED & DISCUSSED WITH THE PATIENT:     I personally reviewed & interpreted, all previously ordered tests as copied above. Latest Labs are pulled in to the note with dates. Labs, specially in Reference to Lipid profile, Cardiac testing in the form of Echo ( dated: ), stress tests ( dated: ) & other relevant cardiac testing reviewed with patient & recommendations made based on assessment of the results. Discussed role of Cardiac risk factors & effects + treatment of co morbidities with patient & advised accordingly. MEDICATIONS: List of medications patient is currently taking is reviewed in detail with the patient & family member present. Discussed any side effects or problems taking the medication. Recommend Continue present management & medications as listed. AFFIRMATION: I spent at least 20 minutes of time reviewing patient's history, previous & current medical problems & all Labs + testing. This includes chart prep even prior to the vosit. Various goals are discussed and multiple questions answered. Relevant concelling performed. Office follow up in six months.

## 2022-11-17 ENCOUNTER — TELEPHONE (OUTPATIENT)
Dept: CARDIOLOGY CLINIC | Age: 62
End: 2022-11-17

## 2022-11-17 NOTE — TELEPHONE ENCOUNTER
Pre- Instructions Venous ablation     Date of Procedure: 11/21/2022 Time: 11am Arrival Time: 10:45am      Please keep yourself hydrated for 24 hours before the procedure ( drink lots of water)  Please  the Valium that was sent to ??  and bring it with you to the procedure. Please wear loose comfortable clothing. Patient does not need to wear compression stockings to the procedure. You will need someone with you to drive you home. Please eat a light breakfast in the morning  Please continue to take medications as needed. HERMELINDO on  for patient to return call to confirm.       I called [facility] to the [facility] in [place]

## 2022-11-21 ENCOUNTER — TELEPHONE (OUTPATIENT)
Dept: CARDIOLOGY CLINIC | Age: 62
End: 2022-11-21

## 2022-11-21 ENCOUNTER — PROCEDURE VISIT (OUTPATIENT)
Dept: CARDIOLOGY CLINIC | Age: 62
End: 2022-11-21
Payer: MEDICAID

## 2022-11-21 DIAGNOSIS — I10 HYPERTENSION, ESSENTIAL, BENIGN: ICD-10-CM

## 2022-11-21 DIAGNOSIS — F17.200 TOBACCO USE DISORDER: ICD-10-CM

## 2022-11-21 DIAGNOSIS — I47.1 SVT (SUPRAVENTRICULAR TACHYCARDIA) (HCC): ICD-10-CM

## 2022-11-21 DIAGNOSIS — I25.110 CORONARY ARTERY DISEASE INVOLVING NATIVE CORONARY ARTERY OF NATIVE HEART WITH UNSTABLE ANGINA PECTORIS (HCC): ICD-10-CM

## 2022-11-21 DIAGNOSIS — I83.893 VARICOSE VEINS OF BOTH LEGS WITH EDEMA: Primary | ICD-10-CM

## 2022-11-21 DIAGNOSIS — E78.5 DYSLIPIDEMIA: ICD-10-CM

## 2022-11-21 DIAGNOSIS — I21.4 NSTEMI (NON-ST ELEVATED MYOCARDIAL INFARCTION) (HCC): ICD-10-CM

## 2022-11-21 PROCEDURE — 36482 ENDOVEN THER CHEM ADHES 1ST: CPT | Performed by: INTERNAL MEDICINE

## 2022-11-21 NOTE — TELEPHONE ENCOUNTER
Daughter called Mom had Vena Seal this morning , she now has pitting edema , very painful .skin is looking very shiny

## 2022-11-21 NOTE — PROGRESS NOTES
VENOUS PRE-PROCEDURE H & P  11/21/2022    Subjective:  Peggy Bernard is a 63 y.o. female    GENERAL - A-Ox3- In no respiratory distress  Heart - Regular rhythm, normal S1, S2, no gallops, no murmurs, no friction rubs  Chest - CTA & percussion    Vein Exam:     Right ext - varicosities, spider and reticular veins Yes, skin changes Yes, ulcers No, edema Yes    Left ext  - varicosities, spider and reticular veins Yes, skin changes yes, ulcers No, edema Yes    Reflex Study:   10/2021    No evidence of DVT or SVT in the bilateral common femoral vein, femoral    vein, popliteal vein, greater saphenous vein or small saphenous vein. Significant reflux noted of the Right CFV (1.4s) and GSV SFJ (0.6s). Significant reflux noted in the Left GSV Mid Thigh (0.5s). Assessment:   Patient has symptomatic C4 venous disease. Plan:   Ablation of Left GSV. Informed consent obtained.     Tia Tran MD, [facility] [place]

## 2022-11-21 NOTE — PROGRESS NOTES
Endovenous Ablation with VenaSeal Operative Report    11/21/2022    Subjective:  Tiffany Denney is a 63 y.o. female    Procedure Performed:    Endovenous Ablation of the Great Saphenous Vein with VenaSeal Closure System of the  Left side. Indication  Duplex ultrasound was used to map out the insufficient saphenous vein, and access was determined and marked on the overlying skin. The depth and diameter of the vein(s) to be treated was documented. The patient was placed supine on the procedure table and the leg was prepped and draped using sterile technique. Ultrasound guidance was again used to localize the access site. 1% lidocaine was injected as a local anesthetic in the subcutaneous tissues at the target location in the GSV in the lower leg. Using ultrasound guidance, access was gained at this location with the 19 gauge thin walled access needle and followed by introduction of a short guidewire, location confirmed with ultrasound. A small, 3 mm incision was made at the access site to allow for introduction and placement of the 7 Fr x7cm introducer/dilator. The dilator and guidewire were removed. The 0.035 guidewire from the DR [facility]TI[facility] kit was then introduced and positioned at the saphenofemoral junction using ultrasound guidance. The 80 cm 7 Fr introducer sheath/dilator was positioned 5cm from the saphenofemoral junction. The guidewire and dilator were removed, and the remaining sheath was flushed with sterile saline, with the syringe remaining in place prior to the next steps. The cyanoacrylate adhesive was precisely primed into the 5 F delivery catheter and this catheter/syringe combination was attached within the dispenser gun. This \"assembly\" was introduced through the 7 F sheath and positioned 5 cm caudal of the saphenofemoral junction under ultrasound guidance.  The steps from the IFU were followed for dispensing amounts, locations and compression times, e.g 2 aliquots proximally with 3 minutes of compression, and 1 aliquot every 3cm distally with 30 sec of compression along the course of the vessel. Following the last injection and compression sequence, the catheter and introducer sheath were pulled out from the access site. Hemostasis was achieved with manual compression and an adhesive bandage was applied to the incision. Ultrasound confirmed complete coaptation and closure of the treated segments of the GSV, and the absence of any DVT at the saphenofemoral junction. Treatment dose was approximately 1.4 ml and the vein length treated was 36 cm. The drapes were removed and the patient cleaned and prepared for discharge. Post op ultrasound check is scheduled for   48- 72 hours and the patient was given written post-op instructions. Complications: NONE IMMEDIATE    Blood Loss: MINIMAL    Conclusion:   Successful Endovenous Ablation of the Great Saphenous Vein with VenaSeal Closure System of the  Left side.     Yvette Jiménez MD, [facility] [place]

## 2022-11-21 NOTE — TELEPHONE ENCOUNTER
Per Dr Belen Henriquez[facility] Will see at 745 in office tomorrow. Spoke with daughter and patient.

## 2022-11-22 ENCOUNTER — TELEPHONE (OUTPATIENT)
Dept: CARDIOLOGY CLINIC | Age: 62
End: 2022-11-22

## 2022-11-22 NOTE — TELEPHONE ENCOUNTER
I called the patient to see if we could get that appointment for her after her us on her leg that we did the ablation yesterday on her LGSV. I offer her an appt tomorrow at 11:15 to speak to Belen Henriquez. She said no because her ultrasound at 9am . She was going to just wait to see him at her next visit. I asked the patient how she was. And she said horrible. I asked what was wrong. And she said that her wrapping was too tight at the knee. I told her I was sorry and that she could have had cut it yesterday if she needed. But where the we went in at on her leg was close to her knee and had to wrap the knee area. I explain that. But she said she was more upset that no one got to her until close to closing. I explain that I was in the ablation room all day and I could not call the patient until my last one was done. And she said she understood that but someone else could have called her. Sooner then they did yesterday. That she is thinking about switching doctors because of the staff. She would like the manger to call her today. I told her I will forward the message and hopefully she will call today.

## 2022-11-22 NOTE — TELEPHONE ENCOUNTER
Spoke with pt about situation. Let her  know that we were setting up protocol to send telephone notes to triage and not to single persons that may not be available to take care of the note, due to ablation day. Pt voiced understanding and wished that it this didn't happen again. Pt will be in for ultrasound on 11/23 and doesn't need appt to see Dr. Sumi Galicia after ultrasound. She is satisfied talking with me. Pt has been very happy with Julieta Jean and her patience with scheduling the venous ablation, due to unforeseen events.

## 2022-11-23 ENCOUNTER — PROCEDURE VISIT (OUTPATIENT)
Dept: CARDIOLOGY CLINIC | Age: 62
End: 2022-11-23

## 2022-11-23 ENCOUNTER — TELEPHONE (OUTPATIENT)
Dept: CARDIOLOGY CLINIC | Age: 62
End: 2022-11-23

## 2022-11-23 DIAGNOSIS — I87.302 IDIOPATHIC CHRONIC VENOUS HYPERTENSION OF LEFT LOWER EXTREMITY WITHOUT COMPLICATIONS: Primary | ICD-10-CM

## 2022-11-23 NOTE — TELEPHONE ENCOUNTER
Pt called in stating she had asked to have nicotine patches prescribed. If so she would like them called to the [facility] in [facility].  Please advise

## 2022-11-23 NOTE — TELEPHONE ENCOUNTER
Venous doppler  Left CFV is patent with good compressibility and respirophasic signal with    good augmentation. The Left GSV is non-compressible with no evidence of flow just past the    saphenofemoral junction to the knee.      Patient verbally understood

## 2022-11-28 ENCOUNTER — TELEPHONE (OUTPATIENT)
Dept: CARDIOLOGY CLINIC | Age: 62
End: 2022-11-28

## 2022-12-07 ENCOUNTER — TELEPHONE (OUTPATIENT)
Dept: CARDIOLOGY CLINIC | Age: 62
End: 2022-12-07

## 2022-12-07 DIAGNOSIS — I87.2 CHRONIC VENOUS INSUFFICIENCY: Primary | ICD-10-CM

## 2022-12-07 RX ORDER — DIAZEPAM 5 MG/1
5 TABLET ORAL PRN
Qty: 2 TABLET | Refills: 0 | OUTPATIENT
Start: 2022-12-07 | End: 2022-12-12

## 2022-12-07 NOTE — TELEPHONE ENCOUNTER
Pre- Instructions Venous ablation     Date of Procedure: 12/12/22 Time: 2 PM Arrival Time: 145 PM      Please keep yourself hydrated for 24 hours before the procedure ( drink lots of water)  Please  the Valium that was sent to [facility]  and bring it with you to the procedure. Please wear loose comfortable clothing. Patient does not need to wear compression stockings to the procedure. You will need someone with you to drive you home. Please eat a light breakfast in the morning  Please continue to take medications as needed. LM on  for patient to return call to confirm. Reviewed instructions. Medication called in to pharmacy.

## 2022-12-12 ENCOUNTER — TELEPHONE (OUTPATIENT)
Dept: CARDIOLOGY CLINIC | Age: 62
End: 2022-12-12

## 2022-12-12 ENCOUNTER — PROCEDURE VISIT (OUTPATIENT)
Dept: CARDIOLOGY CLINIC | Age: 62
End: 2022-12-12

## 2022-12-12 DIAGNOSIS — I83.893 VARICOSE VEINS OF BOTH LEGS WITH EDEMA: Primary | ICD-10-CM

## 2022-12-12 DIAGNOSIS — F17.200 TOBACCO USE DISORDER: ICD-10-CM

## 2022-12-12 DIAGNOSIS — I21.4 NSTEMI (NON-ST ELEVATED MYOCARDIAL INFARCTION) (HCC): ICD-10-CM

## 2022-12-12 DIAGNOSIS — E78.5 DYSLIPIDEMIA: ICD-10-CM

## 2022-12-12 DIAGNOSIS — I50.32 CHRONIC DIASTOLIC CONGESTIVE HEART FAILURE (HCC): ICD-10-CM

## 2022-12-12 DIAGNOSIS — I25.110 CORONARY ARTERY DISEASE INVOLVING NATIVE CORONARY ARTERY OF NATIVE HEART WITH UNSTABLE ANGINA PECTORIS (HCC): ICD-10-CM

## 2022-12-12 RX ORDER — NICOTINE 21 MG/24HR
1 PATCH, TRANSDERMAL 24 HOURS TRANSDERMAL DAILY
Qty: 42 PATCH | Refills: 3 | Status: SHIPPED | OUTPATIENT
Start: 2022-12-12 | End: 2023-01-23

## 2022-12-12 NOTE — TELEPHONE ENCOUNTER
Patient called for Kristen Ríos, has ablation sched today at 2:00 and needs to talk to Kristen Ríos. Not sure how to get ahold of Kristen Ríos on ablation day. Please call patient.  [phone]

## 2022-12-12 NOTE — PROGRESS NOTES
Endovenous Ablation with VenaSeal Operative Report    12/12/2022    Subjective:  Camila Painting is a 63 y.o. female    Procedure Performed:    Endovenous Ablation of the Great Saphenous Vein with VenaSeal Closure System of the  Right side. Indication  Duplex ultrasound was used to map out the insufficient saphenous vein, and access was determined and marked on the overlying skin. The depth and diameter of the vein(s) to be treated was documented. The patient was placed supine on the procedure table and the leg was prepped and draped using sterile technique. Ultrasound guidance was again used to localize the access site. 1% lidocaine was injected as a local anesthetic in the subcutaneous tissues at the target location in the GSV in the lower leg. Using ultrasound guidance, access was gained at this location with the 19 gauge thin walled access needle and followed by introduction of a short guidewire, location confirmed with ultrasound. A small, 3 mm incision was made at the access site to allow for introduction and placement of the 7 Fr x7cm introducer/dilator. The dilator and guidewire were removed. The 0.035 guidewire from the DR ORTIZ [facility] kit was then introduced and positioned at the saphenofemoral junction using ultrasound guidance. The 80 cm 7 Fr introducer sheath/dilator was positioned 5cm from the saphenofemoral junction. The guidewire and dilator were removed, and the remaining sheath was flushed with sterile saline, with the syringe remaining in place prior to the next steps. The cyanoacrylate adhesive was precisely primed into the 5 F delivery catheter and this catheter/syringe combination was attached within the dispenser gun. This \"assembly\" was introduced through the 7 F sheath and positioned 5 cm caudal of the saphenofemoral junction under ultrasound guidance.  The steps from the IFU were followed for dispensing amounts, locations and compression times, e.g 2 aliquots proximally with 3 minutes of compression, and 1 aliquot every 3cm distally with 30 sec of compression along the course of the vessel. Following the last injection and compression sequence, the catheter and introducer sheath were pulled out from the access site. Hemostasis was achieved with manual compression and an adhesive bandage was applied to the incision. Ultrasound confirmed complete coaptation and closure of the treated segments of the GSV, and the absence of any DVT at the saphenofemoral junction. Treatment dose was approximately 1.4  ml and the vein length treated was 36 cm. The drapes were removed and the patient cleaned and prepared for discharge. Post op ultrasound check is scheduled for   48- 72 hours and the patient was given written post-op instructions. Complications: NONE IMMEDIATE    Blood Loss: MINIMAL    Conclusion:   Successful Endovenous Ablation of the Great Saphenous Vein with VenaSeal Closure System of the  Right side.       Dolly Garcia MD, [facility] [place]

## 2022-12-12 NOTE — PROGRESS NOTES
VENOUS PRE-PROCEDURE H & P  12/12/2022    Subjective:  Charla Chavez is a 63 y.o. female    GENERAL - A-Ox3- In no respiratory distress  Heart - Regular rhythm, normal S1, S2, no gallops, no murmurs, no friction rubs  Chest - CTA & percussion    Vein Exam:     Right ext - varicosities, spider and reticular veins Yes, skin changes Yes, ulcers No, edema Yes    Left ext  - varicosities, spider and reticular veins Yes, skin changes Yes, ulcers No, edema Yes    Reflex Study:   10/2021    No evidence of DVT or SVT in the bilateral common femoral vein, femoral    vein, popliteal vein, greater saphenous vein or small saphenous vein. Significant reflux noted of the Right CFV (1.4s) and GSV SFJ (0.6s). Significant reflux noted in the Left GSV Mid Thigh (0.5s). Assessment:   Patient has symptomatic C4 venous disease. Plan:   Ablation of right GSV  Informed consent obtained.     Melody Wu MD, [facility] [place]

## 2022-12-14 ENCOUNTER — PROCEDURE VISIT (OUTPATIENT)
Dept: CARDIOLOGY CLINIC | Age: 62
End: 2022-12-14
Payer: MEDICAID

## 2022-12-14 DIAGNOSIS — I87.301 IDIOPATHIC CHRONIC VENOUS HYPERTENSION OF RIGHT LOWER EXTREMITY WITHOUT COMPLICATIONS: Primary | ICD-10-CM

## 2022-12-14 PROCEDURE — 93971 EXTREMITY STUDY: CPT | Performed by: INTERNAL MEDICINE

## 2022-12-22 ENCOUNTER — TELEPHONE (OUTPATIENT)
Dept: CARDIOLOGY CLINIC | Age: 62
End: 2022-12-22

## 2022-12-22 NOTE — TELEPHONE ENCOUNTER
Venous doppler   Right CFV is patent with good compressibility and respirophasic signal with    good augmentation. The Right GSV is non-compressible with no evidence of flow just past the    saphenofemoral junction to the knee.        Verbally understood

## 2023-01-12 NOTE — PROGRESS NOTES
You must understand that you've received an Urgent Care treatment only and that you may be released before all your medical problems are known or treated. You, the patient, will arrange for follow up care as instructed.  Follow up with your PCP or specialty clinic as directed in the next 1-2 weeks if not improved or as needed.  You can call [phone] to schedule an appointment with the appropriate provider.  If your condition worsens we recommend that you receive another evaluation at the emergency room immediately or contact your primary medical clinics after hours call service to discuss your concerns.  Please return here or go to the Emergency Department for any concerns or worsening of condition.    If you were prescribed a narcotic or controlled medication, do not drive or operate heavy equipment or machinery while taking these medications.      Zyrtec as directed,   Cool compress keep cool     Any worse go to the ER     OFFICE PROGRESS NOTES      Diego Cook is a 64 y.o. female who has    CHIEF COMPLAINT AS FOLLOWS:  CHEST PAIN: Patient denies any C/O chest pains at this time.      SOB:  C/O SOB since last December.               LEG EDEMA: C/O leg edema with prominent Leg veins, skin discoloration, restless Legs, night time cramps, aching, weak & tired Legs.   PALPITATIONS: Denies any C/O Palpitations   DIZZINESS: No C/O Dizziness   SYNCOPE: None   OTHER:                                     HPI: Patient is here for F/U on her CAD, CMP, CHF,  HTN & Dyslipidemia problems. CAD: Patient has known Hx of  CAD. Had angioplasty  in the past.  Riky Torres has known Hx of ischemic CMP. Being treated with guideline recommended medical / device therapy as stated below. CHF: Patient has known Hx of systolic (HFrEF) . Patient had been managed with medical regimen as stated below. HTN: Patient has known Hx of essential HTN. Has been treated with guideline recommended medical / physical/ diet therapy as stated below. Dyslipidemia: Patient has known Hx of mixed dyslipidemia. Has been treated with guideline recommended medical / physical/ diet therapy as stated below.     Current Outpatient Medications   Medication Sig Dispense Refill    losartan (COZAAR) 25 MG tablet take 1 tablet by mouth once daily 30 tablet 5    solifenacin (VESICARE) 10 MG tablet Take 1 tablet by mouth daily 30 tablet 5    atorvastatin (LIPITOR) 40 MG tablet Take 1 tablet by mouth daily 30 tablet 5    carvedilol (COREG) 12.5 MG tablet Take 1 tablet by mouth 2 times daily 60 tablet 5    fluticasone (FLONASE) 50 MCG/ACT nasal spray instill 2 sprays into each nostril once daily 16 g 5    levothyroxine (SYNTHROID) 88 MCG tablet take 1 tablet by mouth once daily 30 tablet 5    metFORMIN (GLUCOPHAGE) 500 MG tablet take 1 tablet by mouth twice a day 60 tablet 5    mirabegron (MYRBETRIQ) 25 MG TB24 Take 1 tablet by mouth daily 90 tablet 1    fluticasone (FLOVENT HFA) 220 MCG/ACT inhaler inhale 2 puffs by mouth twice a day 3 Inhaler 1    ondansetron (ZOFRAN-ODT) 4 MG disintegrating tablet Take 1 tablet by mouth 3 times daily as needed for Nausea or Vomiting 30 tablet 1    varenicline (CHANTIX STARTING MONTH PAK) 0.5 MG X 11 & 1 MG X 42 tablet Take by mouth. 1 box 0    vitamin D3 (CHOLECALCIFEROL) 10 MCG (400 UNIT) TABS tablet Take 1 tablet by mouth daily 30 tablet 5    EPINEPHrine (EPIPEN) 0.3 MG/0.3ML SOAJ injection   0    LORazepam (ATIVAN) 0.5 MG tablet every 6 hours as needed. 0    donepezil (ARICEPT) 10 MG tablet Take 20 mg by mouth nightly      aspirin EC 81 MG EC tablet Take 1 tablet by mouth daily 90 tablet 3    amitriptyline (ELAVIL) 100 MG tablet Take 100 mg by mouth every evening  2    NAMZARIC 28-10 MG CP24 Take by mouth nightly   2     No current facility-administered medications for this visit. Allergies: Lisinopril and Other  Review of Systems:    Constitutional: Negative for diaphoresis and fatigue  Respiratory: Negative for shortness of breath  Cardiovascular: Negative for chest pain, dyspnea on exertion, claudication, edema, irregular heartbeat, murmur, palpitations or shortness of breath  Musculoskeletal: Negative for muscle pain, muscular weakness, negative for pain in arm and leg or swelling in foot and leg    Objective:  /82   Pulse 92   Ht 5' 4\" (1.626 m)   Wt 188 lb 9.6 oz (85.5 kg)   BMI 32.37 kg/m²   Wt Readings from Last 3 Encounters:   09/30/21 188 lb 9.6 oz (85.5 kg)   04/15/21 191 lb 9.6 oz (86.9 kg)   04/01/21 191 lb 9.6 oz (86.9 kg)     Body mass index is 32.37 kg/m². GENERAL - Alert, oriented, pleasant, in no apparent distress. EYES: No jaundice, no conjunctival pallor. Neck - Supple. No jugular venous distention noted. No carotid bruits. Cardiovascular  Normal S1 and S2 without obvious murmur or gallop. Extremities - No cyanosis, clubbing, there is significant edema with C4 venous changes.     Pulmonary  No respiratory distress. No wheezes or rales.       Lab Review   Lab Results   Component Value Date    TROPONINT <0.010 10/05/2017    TROPONINT 0.940 04/17/2016     Lab Results   Component Value Date     06/09/2015     05/27/2015    PROBNP 375.5 01/11/2018     Lab Results   Component Value Date    INR 0.98 02/09/2018    INR 1.03 01/03/2018     Lab Results   Component Value Date    LABA1C 5.0 10/05/2017    LABA1C 5.2 04/18/2016     Lab Results   Component Value Date    WBC 5.6 10/01/2020    WBC 6.4 02/20/2018    HCT 38.0 10/01/2020    HCT 39.8 05/17/2018    MCV 89.7 10/01/2020    MCV 90.9 05/17/2018     10/01/2020     05/17/2018     Lab Results   Component Value Date    CHOL 165 10/01/2020    CHOL 167 10/15/2018    TRIG 264 (H) 10/01/2020    TRIG 145 10/15/2018    HDL 36 (L) 10/01/2020    HDL 48 10/15/2018    LDLCALC 76 10/01/2020    LDLCALC 90 10/15/2018    LDLDIRECT 104 (H) 07/14/2015    LDLDIRECT 163 (H) 11/26/2014     Lab Results   Component Value Date    ALT 8 10/01/2020    ALT 15 10/15/2018    AST 11 10/01/2020    AST 21 10/15/2018     BMP:    Lab Results   Component Value Date     10/01/2020     03/04/2019    K 4.6 10/01/2020    K 4.8 03/04/2019     10/01/2020     03/04/2019    CO2 28 10/01/2020    CO2 23 03/04/2019    BUN 7 10/01/2020    BUN 7 03/04/2019    CREATININE 1.0 10/01/2020    CREATININE 0.8 03/04/2019     CMP:   Lab Results   Component Value Date     10/01/2020     03/04/2019    K 4.6 10/01/2020    K 4.8 03/04/2019     10/01/2020     03/04/2019    CO2 28 10/01/2020    CO2 23 03/04/2019    BUN 7 10/01/2020    BUN 7 03/04/2019    CREATININE 1.0 10/01/2020    CREATININE 0.8 03/04/2019    PROT 7.2 10/01/2020    PROT 7.6 05/17/2018     Lab Results   Component Value Date    TSH 1.320 04/01/2021    TSH 1.240 12/01/2020    TSHHS 1.470 10/05/2017    TSHHS 1.680 04/17/2016     ECHO 4/5/2020   Left ventricular function is low normal, EF to continue to wear compression stockings. Mural thrombus seen in 2016 is not described in last 2 Echos.   TESTS ORDERED: Venous US                                      All previously ordered tests reviewed.   ARRHYTHMIAS: known H/O SVT S/P Ablation   MEDICATIONS: CPM.   Office f/u in six months.

## 2023-01-31 ENCOUNTER — PROCEDURE VISIT (OUTPATIENT)
Dept: CARDIOLOGY CLINIC | Age: 63
End: 2023-01-31

## 2023-01-31 ENCOUNTER — OFFICE VISIT (OUTPATIENT)
Dept: CARDIOLOGY CLINIC | Age: 63
End: 2023-01-31

## 2023-01-31 VITALS
HEART RATE: 88 BPM | BODY MASS INDEX: 32.1 KG/M2 | SYSTOLIC BLOOD PRESSURE: 130 MMHG | HEIGHT: 64 IN | WEIGHT: 188 LBS | DIASTOLIC BLOOD PRESSURE: 86 MMHG

## 2023-01-31 DIAGNOSIS — I25.110 CORONARY ARTERY DISEASE INVOLVING NATIVE CORONARY ARTERY OF NATIVE HEART WITH UNSTABLE ANGINA PECTORIS (HCC): ICD-10-CM

## 2023-01-31 DIAGNOSIS — F17.200 TOBACCO USE DISORDER: ICD-10-CM

## 2023-01-31 DIAGNOSIS — I50.32 CHRONIC DIASTOLIC CONGESTIVE HEART FAILURE (HCC): ICD-10-CM

## 2023-01-31 DIAGNOSIS — I87.303 IDIOPATHIC CHRONIC VENOUS HYPERTENSION OF BOTH LOWER EXTREMITIES WITHOUT COMPLICATIONS: Primary | ICD-10-CM

## 2023-01-31 DIAGNOSIS — I83.893 VARICOSE VEINS OF BOTH LEGS WITH EDEMA: Primary | ICD-10-CM

## 2023-01-31 DIAGNOSIS — Z98.890 S/P ABLATION OF ACCESSORY BYPASS TRACT: ICD-10-CM

## 2023-01-31 DIAGNOSIS — I47.1 SVT (SUPRAVENTRICULAR TACHYCARDIA) (HCC): ICD-10-CM

## 2023-01-31 DIAGNOSIS — I10 HYPERTENSION, ESSENTIAL, BENIGN: ICD-10-CM

## 2023-01-31 DIAGNOSIS — I21.4 NSTEMI (NON-ST ELEVATED MYOCARDIAL INFARCTION) (HCC): ICD-10-CM

## 2023-01-31 DIAGNOSIS — E78.5 DYSLIPIDEMIA: ICD-10-CM

## 2023-01-31 NOTE — PATIENT INSTRUCTIONS
CORONARY ARTERY DISEASE:Yes   clinically stable. Patient is on optimal medical regimen ( see medication list above )  - Patient is currently  asymptomatic from CAD. - changes in  treatment:   no, On asa & Coreg           - Testing ordered:  no  [facility] classification: 1  CARDIOLITE  4/5/2021    Large apical MI, involving adelso apical & Infero apical portions. No    reversible ischemia    Apical Hypokinesis with preserved LV function LVEF is 53 %      HTN: Well controlled, on Losartan & Coreg   CARDIOMYOPATHY:  known   CONGESTIVE HEART FAILURE:  KNOWN HISTORY. Compensated. VHD: No significant VHD noted  ECHO 4/5/2021   Left ventricular function is low normal, EF is estimated at 40-45%. Mild left ventricular hypertrophy. E/A reversal; indeterminate diastolic function. Hypokinesis of apical septum,apical cap,apical lateral wall. Mild mitral and tricuspid regurgitation is present. RVSP is 22 mmHg. No evidence of pericardial effusion. DYSLIPIDEMIA: Patient's profile is at / near Goal.yes,                                 HDL is low                                Tolerating current medical regimen wellyes,  takes Lipitor                                                            See most recent Lab values in Labs section above. CVI: Patient has symptomatic, clinically C4 venous disease. Patient to continue to wear compression stockings. 10/2021    No evidence of DVT or SVT in the bilateral common femoral vein, femoral    vein, popliteal vein, greater saphenous vein or small saphenous vein. Significant reflux noted of the Right CFV (1.4s) and GSV SFJ (0.6s). Significant reflux noted in the Left GSV Mid Thigh (0.5s). Today's US shows:  B/L CFV are normal, no evidence of DVT. B/L GSVs non compressible from SFJ to knee with no evidence of flow.       ARRHYTHMIAS: known H/O SVT S/P Ablation     TESTS ORDERED:none this visit      PREVIOUSLY ORDERED TESTS REVIEWED & DISCUSSED WITH THE PATIENT:     I personally reviewed & interpreted, all previously ordered tests as copied above. Latest Labs are pulled in to the note with dates. Labs, specially in Reference to Lipid profile, Cardiac testing in the form of Echo ( dated: ), stress tests ( dated: ) & other relevant cardiac testing reviewed with patient & recommendations made based on assessment of the results. Discussed role of Cardiac risk factors & effects + treatment of co morbidities with patient & advised accordingly. MEDICATIONS: List of medications patient is currently taking is reviewed in detail with the patient. Discussed any side effects or problems taking the medication. Recommend Continue present management & medications as listed. AFFIRMATION: I spent at least 20 minutes of time reviewing patient's history, previous & current medical problems & all Labs + testing. This includes chart prep even prior to the vosit. Various goals are discussed and multiple questions answered. Relevant concelling performed. Office follow up in six months.

## 2023-01-31 NOTE — PROGRESS NOTES
OFFICE PROGRESS NOTES      Sarah is a 62 y.o. female who has    CHIEF COMPLAINT AS FOLLOWS:  CHEST PAIN:  Patient denies any C/O chest pains at this time.      SOB: No C/O SOB at this time.              LEG EDEMA: B/L Lower extremity edema is present but better than before.   PALPITATIONS: Denies any C/O Palpitations   DIZZINESS: No C/O Dizziness   SYNCOPE: None   OTHER/ ADDITIONAL COMPLAINTS:                                     HPI: Patient is here for F/U on his CAD, CMP, HTN & Dyslipidemia problems.   CAD: Patient has known CAD. Had angioplasty in the past.  CMP: Patient has known  ischemic CMP. Being treated with guideline recommended medical / device therapy as stated below.   CHF: Patient has known systolic (HFrEF). Patient had been managed with medical regimen as stated below.  HTN: Patient has known essential HTN. Has been treated with guideline recommended medical / physical/ diet therapy as stated below.  Dyslipidemia: Patient has known mixed dyslipidemia. Has been treated with guideline recommended medical / physical/ diet therapy as stated below.                Current Outpatient Medications   Medication Sig Dispense Refill    nicotine (NICODERM CQ) 21 MG/24HR Place 1 patch onto the skin daily 42 patch 3    carvedilol (COREG) 12.5 MG tablet Take 1 tablet by mouth 2 times daily 180 tablet 3    atorvastatin (LIPITOR) 40 MG tablet Take 1 tablet by mouth daily 90 tablet 3    losartan (COZAAR) 25 MG tablet take 1 tablet by mouth once daily 30 tablet 5    aspirin EC 81 MG EC tablet Take 1 tablet by mouth daily 90 tablet 3    gabapentin (NEURONTIN) 100 MG capsule take 1-2 capsules by mouth 2 to 3 hours before BED      diclofenac sodium (VOLTAREN) 1 % GEL MASSAGE 4 GRAMS INTO RIGHT FOOD UP TO 4 TIMES DAILY      calcium carbonate (OYSTER SHELL CALCIUM 500 MG) 1250 (500 Ca) MG tablet Take 1,200 mg by mouth 2 times daily (with meals)      solifenacin (VESICARE) 10 MG tablet Take 1 tablet by mouth daily 30  tablet 5    levothyroxine (SYNTHROID) 88 MCG tablet take 1 tablet by mouth once daily 30 tablet 5    vitamin D3 (CHOLECALCIFEROL) 10 MCG (400 UNIT) TABS tablet Take 1 tablet by mouth daily 30 tablet 5    fluticasone (FLONASE) 50 MCG/ACT nasal spray instill 2 sprays into each nostril once daily 16 g 5    fluticasone (FLOVENT HFA) 220 MCG/ACT inhaler inhale 2 puffs by mouth and INTO THE LUNGS twice a day 12 g 5    ondansetron (ZOFRAN-ODT) 4 MG disintegrating tablet Take 1 tablet by mouth 3 times daily as needed for Nausea or Vomiting 30 tablet 1    EPINEPHrine (EPIPEN) 0.3 MG/0.3ML SOAJ injection   0    LORazepam (ATIVAN) 0.5 MG tablet every 6 hours as needed. 0    donepezil (ARICEPT) 10 MG tablet Take 20 mg by mouth nightly (Patient not taking: Reported on 10/12/2022)      amitriptyline (ELAVIL) 100 MG tablet Take 100 mg by mouth every evening (Patient not taking: Reported on 10/12/2022)  2    NAMZARIC 28-10 MG CP24 Take by mouth nightly  (Patient not taking: Reported on 10/12/2022)  2     No current facility-administered medications for this visit. Allergies: Lisinopril and Other  Review of Systems:    Constitutional: Negative for diaphoresis and fatigue  Respiratory: Negative for shortness of breath  Cardiovascular: Negative for chest pain, dyspnea on exertion, claudication, edema, irregular heartbeat, murmur, palpitations or shortness of breath  Musculoskeletal: Negative for muscle pain, muscular weakness, negative for pain in arm and leg or swelling in foot and leg    Objective:  /86   Pulse 88   Ht 5' 4\" (1.626 m)   Wt 188 lb (85.3 kg)   BMI 32.27 kg/m²   Wt Readings from Last 3 Encounters:   01/31/23 188 lb (85.3 kg)   10/12/22 179 lb 9.6 oz (81.5 kg)   10/06/22 177 lb 9.6 oz (80.6 kg)     Body mass index is 32.27 kg/m². GENERAL - Alert, oriented, pleasant, in no apparent distress. EYES: No jaundice, no conjunctival pallor. Neck - Supple. No jugular venous distention noted.  No carotid bruits. Cardiovascular - Normal S1 and S2 without obvious murmur or gallop. Extremities - No cyanosis, clubbing, or significant edema. Pulmonary - No respiratory distress. No wheezes or rales.       MEDICAL DECISION MAKING & DATA REVIEW:    Lab Review   Lab Results   Component Value Date/Time    TROPONINT <0.010 10/05/2017 08:52 AM    TROPONINT 0.940 04/17/2016 02:41 AM     Lab Results   Component Value Date/Time     06/09/2015 05:01 PM     05/27/2015 10:55 AM    PROBNP 375.5 01/11/2018 10:45 AM     Lab Results   Component Value Date    INR 0.98 02/09/2018    INR 1.03 01/03/2018     Lab Results   Component Value Date    LABA1C 5.6 10/06/2022    LABA1C 5.0 10/05/2017     Lab Results   Component Value Date    WBC 5.6 10/01/2020    WBC 6.4 02/20/2018    HCT 38.0 10/01/2020    HCT 39.8 05/17/2018    MCV 90.9 04/15/2022    MCV 89.7 10/01/2020     10/01/2020     05/17/2018     Lab Results   Component Value Date    CHOL 193 10/06/2022    CHOL 174 10/01/2021    TRIG 150 (H) 10/06/2022    TRIG 162 (H) 10/01/2021    HDL 59 10/06/2022    HDL 37 (L) 10/01/2021    LDLCALC 104 10/06/2022    LDLCALC 105 (H) 10/01/2021    LDLDIRECT 104 (H) 07/14/2015    LDLDIRECT 163 (H) 11/26/2014     Lab Results   Component Value Date    ALT 11 10/06/2022    ALT 15 04/01/2022    AST 18 10/06/2022    AST 19 04/01/2022     BMP:    Lab Results   Component Value Date/Time     10/06/2022 01:33 PM     04/01/2022 01:25 PM    K 4.0 10/06/2022 01:33 PM    K 4.1 04/01/2022 01:25 PM    CL 96 10/06/2022 01:33 PM     04/01/2022 01:25 PM    CO2 24 10/06/2022 01:33 PM    CO2 26 04/01/2022 01:25 PM    BUN 7 10/06/2022 01:33 PM    BUN 6 04/01/2022 01:25 PM    CREATININE 1.1 10/06/2022 01:33 PM    CREATININE 1.1 04/01/2022 01:25 PM     CMP:   Lab Results   Component Value Date/Time     10/06/2022 01:33 PM     04/01/2022 01:25 PM    K 4.0 10/06/2022 01:33 PM    K 4.1 04/01/2022 01:25 PM    CL 96 10/06/2022 01:33 PM     04/01/2022 01:25 PM    CO2 24 10/06/2022 01:33 PM    CO2 26 04/01/2022 01:25 PM    BUN 7 10/06/2022 01:33 PM    BUN 6 04/01/2022 01:25 PM    CREATININE 1.1 10/06/2022 01:33 PM    CREATININE 1.1 04/01/2022 01:25 PM    PROT 7.1 10/06/2022 01:33 PM    PROT 6.7 04/01/2022 01:25 PM     Lab Results   Component Value Date/Time    TSH 1.290 10/06/2022 01:33 PM    TSH 2.430 10/01/2021 01:28 PM    TSHHS 1.470 10/05/2017 08:52 AM    TSHHS 1.680 04/17/2016 01:41 PM       QUALITY MEASURES REVIEWED:  1.CAD:Patient is taking anti platelet agent:Yes  2. DYSLIPIDEMIA: Patient is on cholesterol lowering medication:Yes   3. Beta-Blocker therapy for CAD, if prior Myocardial Infarction:Yes   4. Counselled regarding smoking cessation. Yes   Patient does not Smoke. 5.Anticoagulation therapy (for A.Fib) No   Does Not have A.Fib.  6.Discussed weight management strategies. Assessment & Plan:  Primary / Secondary prevention is the goal by aggressive risk modification, healthy and therapeutic life style changes for cardiovascular risk reduction. CORONARY ARTERY DISEASE:Yes   clinically stable. Patient is on optimal medical regimen ( see medication list above )  - Patient is currently  asymptomatic from CAD. - changes in  treatment:   no, On asa & Coreg           - Testing ordered:  no  [facility] classification: 1  CARDIOLITE  4/5/2021    Large apical MI, involving adelso apical & Infero apical portions. No    reversible ischemia    Apical Hypokinesis with preserved LV function LVEF is 53 %      HTN: Well controlled, on Losartan & Coreg   CARDIOMYOPATHY:  known   CONGESTIVE HEART FAILURE:  KNOWN HISTORY. Compensated. VHD: No significant VHD noted  ECHO 4/5/2021   Left ventricular function is low normal, EF is estimated at 40-45%. Mild left ventricular hypertrophy. E/A reversal; indeterminate diastolic function. Hypokinesis of apical septum,apical cap,apical lateral wall.    Mild mitral and tricuspid regurgitation is present.   RVSP is 22 mmHg.   No evidence of pericardial effusion.  DYSLIPIDEMIA: Patient's profile is at / near Goal.yes,                                 HDL is low                                Tolerating current medical regimen wellyes,  takes Lipitor                                                            See most recent Lab values in Labs section above.     CVI: Patient has symptomatic, clinically C4 venous disease. Patient to continue to wear compression stockings.  10/2021    No evidence of DVT or SVT in the bilateral common femoral vein, femoral    vein, popliteal vein, greater saphenous vein or small saphenous vein.    Significant reflux noted of the Right CFV (1.4s) and GSV SFJ (0.6s).    Significant reflux noted in the Left GSV Mid Thigh (0.5s).     Today's US shows:  B/L CFV are normal, no evidence of DVT.  B/L GSVs non compressible from SFJ to knee with no evidence of flow.      ARRHYTHMIAS: known H/O SVT S/P Ablation     TESTS ORDERED:none this visit      PREVIOUSLY ORDERED TESTS REVIEWED & DISCUSSED WITH THE PATIENT:     I personally reviewed & interpreted, all previously ordered tests as copied above. Latest Labs are pulled in to the note with dates.   Labs, specially in Reference to Lipid profile, Cardiac testing in the form of Echo ( dated: ), stress tests ( dated: ) & other relevant cardiac testing reviewed with patient & recommendations made based on assessment of the results.    Discussed role of Cardiac risk factors & effects + treatment of co morbidities with patient & advised accordingly.     MEDICATIONS: List of medications patient is currently taking is reviewed in detail with the patient. Discussed any side effects or problems taking the medication.     Recommend Continue present management & medications as listed.     AFFIRMATION: I spent at least 20 minutes of time reviewing patient's history, previous & current medical problems & all Labs + testing. This  includes chart prep even prior to the vosit. Various goals are discussed and multiple questions answered. Relevant concelling performed. Office follow up in six months.

## 2023-01-31 NOTE — LETTER
[address] [facility]  [address] [address]  Phone: [phone]  Fax: [phone]    Tess Che MD    January 31, 2023     Darcella Cranker, MD  [address] [address]    Patient: Doreene Crigler   MR Number: [id number]   YOB: 1960   Date of Visit: 1/31/2023       Dear Darcella Cranker:    Thank you for referring Uma Burnett to me for evaluation/treatment. Below are the relevant portions of my assessment and plan of care. If you have questions, please do not hesitate to call me. I look forward to following Fidelina Escalona along with you.     Sincerely,      Tess Che MD

## 2023-02-14 ENCOUNTER — HOSPITAL ENCOUNTER (OUTPATIENT)
Dept: MAMMOGRAPHY | Age: 63
Discharge: HOME | End: 2023-02-14
Payer: MEDICAID

## 2023-02-14 DIAGNOSIS — Z12.31 BREAST CANCER SCREENING BY MAMMOGRAM: ICD-10-CM

## 2023-02-14 PROCEDURE — 77063 BREAST TOMOSYNTHESIS BI: CPT

## 2023-03-11 DIAGNOSIS — J44.9 CHRONIC OBSTRUCTIVE PULMONARY DISEASE, UNSPECIFIED COPD TYPE (HCC): ICD-10-CM

## 2023-03-11 DIAGNOSIS — J30.89 PERENNIAL ALLERGIC RHINITIS: ICD-10-CM

## 2023-03-20 RX ORDER — FLUTICASONE PROPIONATE 50 MCG
SPRAY, SUSPENSION (ML) NASAL
Qty: 16 G | Refills: 5 | OUTPATIENT
Start: 2023-03-20

## 2023-03-20 RX ORDER — FLUTICASONE PROPIONATE 220 UG/1
AEROSOL, METERED RESPIRATORY (INHALATION)
Qty: 12 G | Refills: 5 | OUTPATIENT
Start: 2023-03-20

## 2023-04-06 ENCOUNTER — OFFICE VISIT (OUTPATIENT)
Dept: FAMILY MEDICINE CLINIC | Age: 63
End: 2023-04-06
Payer: MEDICAID

## 2023-04-06 VITALS
TEMPERATURE: 97.2 F | BODY MASS INDEX: 32.68 KG/M2 | DIASTOLIC BLOOD PRESSURE: 70 MMHG | HEIGHT: 64 IN | SYSTOLIC BLOOD PRESSURE: 126 MMHG | WEIGHT: 191.4 LBS

## 2023-04-06 DIAGNOSIS — M79.671 CHRONIC PAIN IN RIGHT FOOT: ICD-10-CM

## 2023-04-06 DIAGNOSIS — E03.4 HYPOTHYROIDISM DUE TO ACQUIRED ATROPHY OF THYROID: Primary | ICD-10-CM

## 2023-04-06 DIAGNOSIS — N32.81 OAB (OVERACTIVE BLADDER): ICD-10-CM

## 2023-04-06 DIAGNOSIS — G89.29 CHRONIC PAIN IN RIGHT FOOT: ICD-10-CM

## 2023-04-06 DIAGNOSIS — E78.5 DYSLIPIDEMIA: ICD-10-CM

## 2023-04-06 DIAGNOSIS — J44.9 CHRONIC OBSTRUCTIVE PULMONARY DISEASE, UNSPECIFIED COPD TYPE (HCC): ICD-10-CM

## 2023-04-06 DIAGNOSIS — F01.B0 MODERATE VASCULAR DEMENTIA WITHOUT BEHAVIORAL DISTURBANCE, PSYCHOTIC DISTURBANCE, MOOD DISTURBANCE, OR ANXIETY (HCC): ICD-10-CM

## 2023-04-06 DIAGNOSIS — J30.89 PERENNIAL ALLERGIC RHINITIS: ICD-10-CM

## 2023-04-06 DIAGNOSIS — F33.1 MODERATE EPISODE OF RECURRENT MAJOR DEPRESSIVE DISORDER (HCC): ICD-10-CM

## 2023-04-06 LAB — TSH SERPL DL<=0.005 MIU/L-ACNC: 1.87 UIU/ML (ref 0.27–4.2)

## 2023-04-06 PROCEDURE — G8417 CALC BMI ABV UP PARAM F/U: HCPCS | Performed by: FAMILY MEDICINE

## 2023-04-06 PROCEDURE — 36415 COLL VENOUS BLD VENIPUNCTURE: CPT | Performed by: FAMILY MEDICINE

## 2023-04-06 PROCEDURE — 3078F DIAST BP <80 MM HG: CPT | Performed by: FAMILY MEDICINE

## 2023-04-06 PROCEDURE — G8428 CUR MEDS NOT DOCUMENT: HCPCS | Performed by: FAMILY MEDICINE

## 2023-04-06 PROCEDURE — 3023F SPIROM DOC REV: CPT | Performed by: FAMILY MEDICINE

## 2023-04-06 PROCEDURE — 4004F PT TOBACCO SCREEN RCVD TLK: CPT | Performed by: FAMILY MEDICINE

## 2023-04-06 PROCEDURE — 3017F COLORECTAL CA SCREEN DOC REV: CPT | Performed by: FAMILY MEDICINE

## 2023-04-06 PROCEDURE — 3074F SYST BP LT 130 MM HG: CPT | Performed by: FAMILY MEDICINE

## 2023-04-06 PROCEDURE — 99215 OFFICE O/P EST HI 40 MIN: CPT | Performed by: FAMILY MEDICINE

## 2023-04-06 RX ORDER — FLUTICASONE PROPIONATE 220 UG/1
AEROSOL, METERED RESPIRATORY (INHALATION)
Qty: 12 G | Refills: 5 | Status: SHIPPED | OUTPATIENT
Start: 2023-04-06

## 2023-04-06 RX ORDER — LIDOCAINE 50 MG/G
PATCH TOPICAL
COMMUNITY
Start: 2023-03-30

## 2023-04-06 RX ORDER — OMEGA-3S/DHA/EPA/FISH OIL/D3 300MG-1000
400 CAPSULE ORAL DAILY
Qty: 30 TABLET | Refills: 5 | Status: SHIPPED | OUTPATIENT
Start: 2023-04-06

## 2023-04-06 RX ORDER — LEVOTHYROXINE SODIUM 88 UG/1
TABLET ORAL
Qty: 30 TABLET | Refills: 5 | Status: SHIPPED | OUTPATIENT
Start: 2023-04-06

## 2023-04-06 RX ORDER — PREGABALIN 75 MG/1
75 CAPSULE ORAL 3 TIMES DAILY
COMMUNITY
Start: 2023-03-30

## 2023-04-06 RX ORDER — FLUTICASONE PROPIONATE 50 MCG
SPRAY, SUSPENSION (ML) NASAL
Qty: 16 G | Refills: 5 | Status: SHIPPED | OUTPATIENT
Start: 2023-04-06

## 2023-04-06 RX ORDER — AMITRIPTYLINE HYDROCHLORIDE 100 MG/1
100 TABLET, FILM COATED ORAL EVERY EVENING
Qty: 30 TABLET | Refills: 0 | Status: SHIPPED | OUTPATIENT
Start: 2023-04-06

## 2023-04-06 ASSESSMENT — PATIENT HEALTH QUESTIONNAIRE - PHQ9
1. LITTLE INTEREST OR PLEASURE IN DOING THINGS: 2
SUM OF ALL RESPONSES TO PHQ QUESTIONS 1-9: 6
5. POOR APPETITE OR OVEREATING: 0
4. FEELING TIRED OR HAVING LITTLE ENERGY: 1
SUM OF ALL RESPONSES TO PHQ QUESTIONS 1-9: 6
SUM OF ALL RESPONSES TO PHQ QUESTIONS 1-9: 6
10. IF YOU CHECKED OFF ANY PROBLEMS, HOW DIFFICULT HAVE THESE PROBLEMS MADE IT FOR YOU TO DO YOUR WORK, TAKE CARE OF THINGS AT HOME, OR GET ALONG WITH OTHER PEOPLE: 1
SUM OF ALL RESPONSES TO PHQ9 QUESTIONS 1 & 2: 4
7. TROUBLE CONCENTRATING ON THINGS, SUCH AS READING THE NEWSPAPER OR WATCHING TELEVISION: 0
8. MOVING OR SPEAKING SO SLOWLY THAT OTHER PEOPLE COULD HAVE NOTICED. OR THE OPPOSITE, BEING SO FIGETY OR RESTLESS THAT YOU HAVE BEEN MOVING AROUND A LOT MORE THAN USUAL: 0
2. FEELING DOWN, DEPRESSED OR HOPELESS: 2
SUM OF ALL RESPONSES TO PHQ QUESTIONS 1-9: 6
3. TROUBLE FALLING OR STAYING ASLEEP: 0
9. THOUGHTS THAT YOU WOULD BE BETTER OFF DEAD, OR OF HURTING YOURSELF: 0
6. FEELING BAD ABOUT YOURSELF - OR THAT YOU ARE A FAILURE OR HAVE LET YOURSELF OR YOUR FAMILY DOWN: 1

## 2023-04-06 NOTE — PROGRESS NOTES
SUBJECTIVE: Maria Elena Arnold is a 63 y.o. female here for follow up of hypothyroidism. Scheduled Meds: levothyroxine 88 mcg daily    Lab Results   Component Value Date    TSH 1.290 10/06/2022     Thyroid ROS: denies fatigue, weight changes, heat/cold intolerance, bowel/skin changes or CVS symptoms. Hyperlipidemia: Patient presents with hyperlipidemia. She was tested because screening. Her last labs showed Total cholesterol of 193, HDL 59, ,  Triglycerides 150. There is not a family history of hyperlipidemia. There is not a family history of early ischemia heart disease. OAB on myrbetriq, has good control. COPD: Patient complains of dyspnea. Symptoms began several years ago. Symptoms chronic dyspnea does worsen with exertion. Sputum is  absent . Fever has been  absent . Patient uses 1 pillows at night. Patient can walk 500 feet before resting. Patient currently is not on home oxygen therapy. Author Wali Respiratory history: COPD    Patient states that her right foot has been worse since her bunion surgery. She is now seeing a new podiatrist.  She has been sent to pain management for the nerve pain. Currently on Lyrica 75 mg tid. Planning on a lumbar spinal injection. She has been feeling depressed due to the chronic pain. She had been on amitriptyline 100 mg daily in the past for this after she had her CVA for depression and for aphasia. She is no longer seeing the neurologist of so she has not been on this medication. She would like to restart it. Patient also has had dementia since her CVA. She had been on Aricept which was switched to Namzaric. She has not been on this for a couple of years and would like to restart this as well. States that her memory seems to be getting worse again. OBJECTIVE:   Vitals:    04/06/23 1305   BP: 126/70   Temp: 97.2 °F (36.2 °C)       No acute distress. Alert and Oriented x 3, obese  HEENT: Atraumatic. Normocephalic.  PERRLA, EOMI, Conjunctiva

## 2023-04-07 DIAGNOSIS — F33.1 MODERATE EPISODE OF RECURRENT MAJOR DEPRESSIVE DISORDER (HCC): Primary | ICD-10-CM

## 2023-04-07 RX ORDER — ESCITALOPRAM OXALATE 20 MG/1
20 TABLET ORAL DAILY
Qty: 90 TABLET | Refills: 1 | Status: SHIPPED | OUTPATIENT
Start: 2023-04-07

## 2023-04-08 DIAGNOSIS — N32.81 OAB (OVERACTIVE BLADDER): ICD-10-CM

## 2023-04-10 RX ORDER — SOLIFENACIN SUCCINATE 10 MG/1
TABLET, FILM COATED ORAL
Qty: 30 TABLET | Refills: 5 | OUTPATIENT
Start: 2023-04-10

## 2023-04-24 ENCOUNTER — HOSPITAL ENCOUNTER (OUTPATIENT)
Age: 63
Setting detail: SPECIMEN
Discharge: HOME | End: 2023-04-24
Payer: MEDICAID

## 2023-04-24 ENCOUNTER — OFFICE VISIT (OUTPATIENT)
Dept: OBGYN | Age: 63
End: 2023-04-24
Payer: MEDICAID

## 2023-04-24 DIAGNOSIS — Z01.419 ENCOUNTER FOR ANNUAL ROUTINE GYNECOLOGICAL EXAMINATION: Primary | ICD-10-CM

## 2023-04-24 DIAGNOSIS — Z13.820 ENCOUNTER FOR OSTEOPOROSIS SCREENING IN ASYMPTOMATIC POSTMENOPAUSAL PATIENT: ICD-10-CM

## 2023-04-24 DIAGNOSIS — N90.89 VULVAR LESION: ICD-10-CM

## 2023-04-24 DIAGNOSIS — Z78.0 ENCOUNTER FOR OSTEOPOROSIS SCREENING IN ASYMPTOMATIC POSTMENOPAUSAL PATIENT: ICD-10-CM

## 2023-04-24 DIAGNOSIS — Z11.51 ENCOUNTER FOR SCREENING FOR HUMAN PAPILLOMAVIRUS (HPV): ICD-10-CM

## 2023-04-24 DIAGNOSIS — D39.9 NEOPLASM OF UNCERTAIN BEHAVIOR OF FEMALE GENITAL ORGANS: ICD-10-CM

## 2023-04-24 PROCEDURE — 99386 PREV VISIT NEW AGE 40-64: CPT | Performed by: OBSTETRICS & GYNECOLOGY

## 2023-04-24 PROCEDURE — 87624 HPV HI-RISK TYP POOLED RSLT: CPT

## 2023-04-24 PROCEDURE — 88142 CYTOPATH C/V THIN LAYER: CPT

## 2023-04-24 SDOH — ECONOMIC STABILITY: HOUSING INSECURITY
IN THE LAST 12 MONTHS, WAS THERE A TIME WHEN YOU DID NOT HAVE A STEADY PLACE TO SLEEP OR SLEPT IN A SHELTER (INCLUDING NOW)?: NO

## 2023-04-24 SDOH — ECONOMIC STABILITY: INCOME INSECURITY: HOW HARD IS IT FOR YOU TO PAY FOR THE VERY BASICS LIKE FOOD, HOUSING, MEDICAL CARE, AND HEATING?: HARD

## 2023-04-24 SDOH — ECONOMIC STABILITY: FOOD INSECURITY: WITHIN THE PAST 12 MONTHS, YOU WORRIED THAT YOUR FOOD WOULD RUN OUT BEFORE YOU GOT MONEY TO BUY MORE.: SOMETIMES TRUE

## 2023-04-24 SDOH — ECONOMIC STABILITY: FOOD INSECURITY: WITHIN THE PAST 12 MONTHS, THE FOOD YOU BOUGHT JUST DIDN'T LAST AND YOU DIDN'T HAVE MONEY TO GET MORE.: SOMETIMES TRUE

## 2023-04-24 ASSESSMENT — ENCOUNTER SYMPTOMS
EYES NEGATIVE: 1
RESPIRATORY NEGATIVE: 1
GASTROINTESTINAL NEGATIVE: 1
ALLERGIC/IMMUNOLOGIC NEGATIVE: 1

## 2023-04-24 NOTE — PROGRESS NOTES
4/24/23    Maria Elena Arnold  1960    Chief Complaint   Patient presents with    New Patient     Pt here for annual, has not had hyster, postmenopausal, hrt-none, pap-2015-neg, mammo-2/23-neg, odba-1532-mszjpbimum-taking calcium and vitamin d 3, [id number]. Pt c/o labia moles x yrs, wants to discuss having removed, bx done 2015-neg. The patient is a 63 y.o. female, Suadyce Asif who presents for her annual exam.  She is menopausal.  She is not taking HRT. Author Showman She is  sexually active. She reports additional symptoms of labial moles . Pap smear history: Her last PAP smear was in 2015. Her results were normal.    Breast history: her most recent mammogram was in 2023. The results were: Normal    Osteoporosis Status: her bone density scan was in 2020. The results were osteopenia - treatment: calcium supplement    Colonoscopy Status: she had a colonoscopy in 2017. The results were normal.    Past Medical History:   Diagnosis Date    Abnormal nuclear stress test     ACS (acute coronary syndrome) ([facility])     Arterial ischemic stroke, MCA (middle cerebral artery), left, acute ([facility])     Benign mole     CAD (coronary artery disease)     CHF (congestive heart failure) ([facility])     COPD (chronic obstructive pulmonary disease) ([facility])     CVA (cerebral vascular accident) ([facility] [address] 04/06/2014    H/O cardiovascular stress test 05/19/2016    treadmill    History of left heart catheterization 04/17/2016    Severe 2 vessel disease, but 3 vessel disease. 2.5x30 Resolute Stent placed to the LAD. Successful angio-seal deployment w/ excellent results. History of stress test 03/05/2021    Large apical MI, involving adelso apical & Infero apical portions. No reversible ischemia  Apical Hypokinesis with preserved LV function LVEF is 53 %    Hx of cardiovascular stress test 06/22/2015    lexiscan-scar,EF47%    Hx of echocardiogram 04/08/2014    VALDEMAR: EF 40-45%. Right and left atrium/ventricles are normal. Mitral/Tricuspid

## 2023-04-25 NOTE — PROGRESS NOTES
Per [facility] online, no prior auth required for [id number]  Ref # [id number]  Ready to schedule

## 2023-04-28 LAB
HPV HIGH RISK: NOT DETECTED
HPV, GENOTYPE 16: NOT DETECTED
HPV, GENOTYPE 18: NOT DETECTED

## 2023-05-01 DIAGNOSIS — F33.1 MODERATE EPISODE OF RECURRENT MAJOR DEPRESSIVE DISORDER (HCC): ICD-10-CM

## 2023-05-01 RX ORDER — AMITRIPTYLINE HYDROCHLORIDE 100 MG/1
100 TABLET, FILM COATED ORAL EVERY EVENING
Qty: 30 TABLET | Refills: 0 | Status: SHIPPED | OUTPATIENT
Start: 2023-05-01

## 2023-05-04 DIAGNOSIS — I10 HYPERTENSION, ESSENTIAL, BENIGN: ICD-10-CM

## 2023-05-04 DIAGNOSIS — I25.110 CORONARY ARTERY DISEASE INVOLVING NATIVE CORONARY ARTERY OF NATIVE HEART WITH UNSTABLE ANGINA PECTORIS (HCC): ICD-10-CM

## 2023-05-04 RX ORDER — LOSARTAN POTASSIUM 25 MG/1
25 TABLET ORAL DAILY
Qty: 30 TABLET | Refills: 5 | Status: SHIPPED | OUTPATIENT
Start: 2023-05-04

## 2023-05-10 ENCOUNTER — HOSPITAL ENCOUNTER (OUTPATIENT)
Dept: WOMENS IMAGING | Age: 63
Discharge: HOME | End: 2023-05-10
Payer: MEDICAID

## 2023-05-10 DIAGNOSIS — Z78.0 OSTEOPENIA AFTER MENOPAUSE: Primary | ICD-10-CM

## 2023-05-10 DIAGNOSIS — Z78.0 ENCOUNTER FOR OSTEOPOROSIS SCREENING IN ASYMPTOMATIC POSTMENOPAUSAL PATIENT: ICD-10-CM

## 2023-05-10 DIAGNOSIS — M85.80 OSTEOPENIA AFTER MENOPAUSE: Primary | ICD-10-CM

## 2023-05-10 DIAGNOSIS — Z13.820 ENCOUNTER FOR OSTEOPOROSIS SCREENING IN ASYMPTOMATIC POSTMENOPAUSAL PATIENT: ICD-10-CM

## 2023-05-10 PROCEDURE — 77080 DXA BONE DENSITY AXIAL: CPT

## 2023-05-10 PROCEDURE — 36415 COLL VENOUS BLD VENIPUNCTURE: CPT | Performed by: OBSTETRICS & GYNECOLOGY

## 2023-05-12 ENCOUNTER — OFFICE VISIT (OUTPATIENT)
Dept: FAMILY MEDICINE CLINIC | Age: 63
End: 2023-05-12

## 2023-05-12 VITALS
SYSTOLIC BLOOD PRESSURE: 110 MMHG | BODY MASS INDEX: 33.32 KG/M2 | HEART RATE: 101 BPM | RESPIRATION RATE: 16 BRPM | DIASTOLIC BLOOD PRESSURE: 62 MMHG | OXYGEN SATURATION: 97 % | HEIGHT: 64 IN | WEIGHT: 195.2 LBS

## 2023-05-12 DIAGNOSIS — M25.511 CHRONIC RIGHT SHOULDER PAIN: ICD-10-CM

## 2023-05-12 DIAGNOSIS — F01.B0 MODERATE VASCULAR DEMENTIA WITHOUT BEHAVIORAL DISTURBANCE, PSYCHOTIC DISTURBANCE, MOOD DISTURBANCE, OR ANXIETY (HCC): ICD-10-CM

## 2023-05-12 DIAGNOSIS — I73.00 RAYNAUD'S DISEASE WITHOUT GANGRENE: ICD-10-CM

## 2023-05-12 DIAGNOSIS — G89.29 CHRONIC RIGHT SHOULDER PAIN: ICD-10-CM

## 2023-05-12 DIAGNOSIS — F33.1 MODERATE EPISODE OF RECURRENT MAJOR DEPRESSIVE DISORDER (HCC): Primary | ICD-10-CM

## 2023-05-12 RX ORDER — AMITRIPTYLINE HYDROCHLORIDE 100 MG/1
100 TABLET, FILM COATED ORAL EVERY EVENING
Qty: 30 TABLET | Refills: 5 | Status: SHIPPED | OUTPATIENT
Start: 2023-05-12

## 2023-05-12 ASSESSMENT — PATIENT HEALTH QUESTIONNAIRE - PHQ9
SUM OF ALL RESPONSES TO PHQ QUESTIONS 1-9: 0
6. FEELING BAD ABOUT YOURSELF - OR THAT YOU ARE A FAILURE OR HAVE LET YOURSELF OR YOUR FAMILY DOWN: 0
1. LITTLE INTEREST OR PLEASURE IN DOING THINGS: 0
5. POOR APPETITE OR OVEREATING: 0
3. TROUBLE FALLING OR STAYING ASLEEP: 0
4. FEELING TIRED OR HAVING LITTLE ENERGY: 0
SUM OF ALL RESPONSES TO PHQ9 QUESTIONS 1 & 2: 0
7. TROUBLE CONCENTRATING ON THINGS, SUCH AS READING THE NEWSPAPER OR WATCHING TELEVISION: 0
9. THOUGHTS THAT YOU WOULD BE BETTER OFF DEAD, OR OF HURTING YOURSELF: 0
SUM OF ALL RESPONSES TO PHQ QUESTIONS 1-9: 0
SUM OF ALL RESPONSES TO PHQ QUESTIONS 1-9: 0
10. IF YOU CHECKED OFF ANY PROBLEMS, HOW DIFFICULT HAVE THESE PROBLEMS MADE IT FOR YOU TO DO YOUR WORK, TAKE CARE OF THINGS AT HOME, OR GET ALONG WITH OTHER PEOPLE: 0
SUM OF ALL RESPONSES TO PHQ QUESTIONS 1-9: 0
2. FEELING DOWN, DEPRESSED OR HOPELESS: 0
8. MOVING OR SPEAKING SO SLOWLY THAT OTHER PEOPLE COULD HAVE NOTICED. OR THE OPPOSITE, BEING SO FIGETY OR RESTLESS THAT YOU HAVE BEEN MOVING AROUND A LOT MORE THAN USUAL: 0

## 2023-05-12 NOTE — PROGRESS NOTES
Subjective:       Xavi Horne is a 63 y.o. female who presents for follow up of depression. Current symptoms include depressed mood and fatigue. Symptoms have been gradually improving since that time. Patient denies feelings of worthlessness/guilt and suicidal thoughts with specific plan. Previous treatment includes:  Lexapro and Amitriptyline . She complains of the following side effects from the treatment: none. Her aphasia has improved since adding Amitriptyline to Lexapro    Dementia:  has improved on Namzaric. Denies side effects. Chronic right shoulder pain for 2 1/2 years. Worse with working over head or with lifting. Has been using heating pad which helps a little. Skin discoloration in hands which occurs when she is exposed to cold. Patient's medications, allergies, past medical, surgical, social and family histories were reviewed and updated as appropriate. Review of Systems  ROS:  Energy level fair overall, and weight is stable. No chest pain or shortness of breath. Bowels have been normal without constipation or diarrhea. No shakes or tremors. Objective:      /62 (Site: Left Upper Arm, Position: Sitting, Cuff Size: Medium Adult)   Pulse (!) 101   Resp 16   Ht 5' 4\" (1.626 m)   Wt 195 lb 3.2 oz (88.5 kg)   SpO2 97%   BMI 33.51 kg/m²    General:  alert, appears stated age, and cooperative   Neck: Thyroid not palpable, not enlarged, no nodules detected. Chest: clear with no wheezes or rales. No retractions, or use of accessory muscles noted. Cardiovascular: PMI is not displaced, and no thrill noted. Regular rate and rhythm with no rub, murmur or gallop. No peripheral edema. Pedal pulses are normal.    Affect & Behavior:  full facial expressions, good grooming, good insight, normal perception, normal reasoning, normal speech pattern and content, and normal thought patterns          Assessment:      Diagnosis Orders   1.  Moderate episode of recurrent

## 2023-05-15 ENCOUNTER — PROCEDURE VISIT (OUTPATIENT)
Dept: CARDIOLOGY CLINIC | Age: 63
End: 2023-05-15
Payer: MEDICAID

## 2023-05-15 DIAGNOSIS — I87.301 CHRONIC VENOUS HYPERTENSION INVOLVING RIGHT SIDE: Primary | ICD-10-CM

## 2023-05-15 PROCEDURE — 93971 EXTREMITY STUDY: CPT | Performed by: INTERNAL MEDICINE

## 2023-05-18 ENCOUNTER — TELEPHONE (OUTPATIENT)
Dept: CARDIOLOGY CLINIC | Age: 63
End: 2023-05-18

## 2023-05-18 NOTE — TELEPHONE ENCOUNTER
Venous doppler:5/15/2023   Bilateral CFV is patent with good compressibility and respirophasic signal   with good augmentation.    The Bilateral GSV is non-compressible with no evidence of flow just past the   saphenofemoral junction to the knee    Patient verbally understood

## 2023-06-05 ENCOUNTER — TELEPHONE (OUTPATIENT)
Dept: OBGYN | Age: 63
End: 2023-06-05

## 2023-06-05 ENCOUNTER — TELEPHONE (OUTPATIENT)
Dept: CARDIOLOGY CLINIC | Age: 63
End: 2023-06-05

## 2023-06-05 ENCOUNTER — ANESTHESIA EVENT (OUTPATIENT)
Dept: OPERATING ROOM | Age: 63
End: 2023-06-05
Payer: MEDICAID

## 2023-06-05 ENCOUNTER — OFFICE VISIT (OUTPATIENT)
Dept: OBGYN | Age: 63
End: 2023-06-05
Payer: MEDICAID

## 2023-06-05 VITALS
DIASTOLIC BLOOD PRESSURE: 79 MMHG | SYSTOLIC BLOOD PRESSURE: 124 MMHG | HEIGHT: 64 IN | BODY MASS INDEX: 33.29 KG/M2 | WEIGHT: 195 LBS

## 2023-06-05 DIAGNOSIS — I25.110 CORONARY ARTERY DISEASE INVOLVING NATIVE CORONARY ARTERY OF NATIVE HEART WITH UNSTABLE ANGINA PECTORIS (HCC): Primary | ICD-10-CM

## 2023-06-05 DIAGNOSIS — N90.89 VULVAR LESION: Primary | ICD-10-CM

## 2023-06-05 DIAGNOSIS — Z01.810 PRE-OPERATIVE CARDIOVASCULAR EXAMINATION: ICD-10-CM

## 2023-06-05 PROCEDURE — 3074F SYST BP LT 130 MM HG: CPT | Performed by: OBSTETRICS & GYNECOLOGY

## 2023-06-05 PROCEDURE — G8417 CALC BMI ABV UP PARAM F/U: HCPCS | Performed by: OBSTETRICS & GYNECOLOGY

## 2023-06-05 PROCEDURE — G8428 CUR MEDS NOT DOCUMENT: HCPCS | Performed by: OBSTETRICS & GYNECOLOGY

## 2023-06-05 PROCEDURE — 4004F PT TOBACCO SCREEN RCVD TLK: CPT | Performed by: OBSTETRICS & GYNECOLOGY

## 2023-06-05 PROCEDURE — 99212 OFFICE O/P EST SF 10 MIN: CPT | Performed by: OBSTETRICS & GYNECOLOGY

## 2023-06-05 PROCEDURE — 3078F DIAST BP <80 MM HG: CPT | Performed by: OBSTETRICS & GYNECOLOGY

## 2023-06-05 PROCEDURE — 3017F COLORECTAL CA SCREEN DOC REV: CPT | Performed by: OBSTETRICS & GYNECOLOGY

## 2023-06-05 ASSESSMENT — LIFESTYLE VARIABLES: SMOKING_STATUS: 1

## 2023-06-05 NOTE — TELEPHONE ENCOUNTER
Need clearance for partial vulvectomy 6/7, Dr Florentina Cockayne. Per Dr Joann Hutton, need nuc med.  Patient scheduled

## 2023-06-05 NOTE — PROGRESS NOTES
(age 72 y+), Adjuvanted, 0.5mL 10/06/2022    Influenza, FLUARIX, FLULAVAL, FLUZONE (age 10 mo+) AND AFLURIA, (age 1 y+), PF, 0.5mL 11/02/2016, 08/24/2017, 10/15/2018, 10/02/2019, 10/01/2020    Influenza, FLUCELVAX, (age 10 mo+), MDCK, PF, 0.5mL 10/01/2021    Pneumococcal, PPSV23, PNEUMOVAX 23, (age 2y+), SC/IM, 0.5mL 09/11/2014    TDaP, ADACEL (age 10y-63y), BOOSTRIX (age 10y+), IM, 0.5mL 06/05/2014, 04/03/2020    Zoster Live (Zostavax) 04/27/2017    Zoster Recombinant (Shingrix) 12/02/2019, 04/03/2020       Review of Systems    /79 (Site: Right Upper Arm, Position: Sitting, Cuff Size: Large Adult)   Ht 5' 4\" (1.626 m)   Wt 195 lb (88.5 kg)   BMI 33.47 kg/m²     Physical Exam    No results found for this visit on 06/05/23. ASSESSMENT AND PLAN   Diagnosis Orders   1. Vulvar lesion          Discussed monitoring the vulvar lesions and/or biopsing one or two. She adamently desires them to be removed. We discussed risk of infection, hemorrhage, blood transfusion, risk of repeat heart attach and death. Return in about 2 weeks (around 6/19/2023).     Jerome Dangelo MD

## 2023-06-06 ENCOUNTER — TELEPHONE (OUTPATIENT)
Dept: CARDIOLOGY CLINIC | Age: 63
End: 2023-06-06

## 2023-06-06 ENCOUNTER — PROCEDURE VISIT (OUTPATIENT)
Dept: CARDIOLOGY CLINIC | Age: 63
End: 2023-06-06

## 2023-06-06 DIAGNOSIS — R94.31 ABNORMAL EKG: Primary | ICD-10-CM

## 2023-06-06 DIAGNOSIS — Z01.810 PRE-OPERATIVE CARDIOVASCULAR EXAMINATION: ICD-10-CM

## 2023-06-06 DIAGNOSIS — I25.110 CORONARY ARTERY DISEASE INVOLVING NATIVE CORONARY ARTERY OF NATIVE HEART WITH UNSTABLE ANGINA PECTORIS (HCC): ICD-10-CM

## 2023-06-06 LAB
LV EF: 45 %
LVEF MODALITY: NORMAL

## 2023-06-06 NOTE — TELEPHONE ENCOUNTER
Nuc med results to patient, advised clearance sent to Dr Elenita De La Vega. Patient would still like a phone call from supervisor.

## 2023-06-06 NOTE — PROGRESS NOTES
Attempted to contact patient  with surgery time at [facility] 6/7/23 1230 with arrival at 56 all phone contact numbers continue to ring busy, physician office notified

## 2023-06-07 ENCOUNTER — HOSPITAL ENCOUNTER (OUTPATIENT)
Age: 63
Setting detail: OUTPATIENT SURGERY
Discharge: HOME | End: 2023-06-07
Attending: OBSTETRICS & GYNECOLOGY | Admitting: OBSTETRICS & GYNECOLOGY
Payer: MEDICAID

## 2023-06-07 ENCOUNTER — ANESTHESIA (OUTPATIENT)
Dept: OPERATING ROOM | Age: 63
End: 2023-06-07
Payer: MEDICAID

## 2023-06-07 VITALS
DIASTOLIC BLOOD PRESSURE: 69 MMHG | SYSTOLIC BLOOD PRESSURE: 117 MMHG | WEIGHT: 195 LBS | OXYGEN SATURATION: 95 % | BODY MASS INDEX: 33.29 KG/M2 | RESPIRATION RATE: 16 BRPM | TEMPERATURE: 97.2 F | HEART RATE: 75 BPM | HEIGHT: 64 IN

## 2023-06-07 DIAGNOSIS — G89.18 POSTOPERATIVE PAIN: Primary | ICD-10-CM

## 2023-06-07 DIAGNOSIS — N90.89 VULVAR LESION: ICD-10-CM

## 2023-06-07 PROCEDURE — 3600000012 HC SURGERY LEVEL 2 ADDTL 15MIN: Performed by: OBSTETRICS & GYNECOLOGY

## 2023-06-07 PROCEDURE — 3600000002 HC SURGERY LEVEL 2 BASE: Performed by: OBSTETRICS & GYNECOLOGY

## 2023-06-07 PROCEDURE — 3700000000 HC ANESTHESIA ATTENDED CARE: Performed by: OBSTETRICS & GYNECOLOGY

## 2023-06-07 PROCEDURE — 2580000003 HC RX 258: Performed by: ANESTHESIOLOGY

## 2023-06-07 PROCEDURE — 7100000011 HC PHASE II RECOVERY - ADDTL 15 MIN: Performed by: OBSTETRICS & GYNECOLOGY

## 2023-06-07 PROCEDURE — 6370000000 HC RX 637 (ALT 250 FOR IP): Performed by: ANESTHESIOLOGY

## 2023-06-07 PROCEDURE — 2580000003 HC RX 258: Performed by: OBSTETRICS & GYNECOLOGY

## 2023-06-07 PROCEDURE — 3700000001 HC ADD 15 MINUTES (ANESTHESIA): Performed by: OBSTETRICS & GYNECOLOGY

## 2023-06-07 PROCEDURE — 7100000001 HC PACU RECOVERY - ADDTL 15 MIN: Performed by: OBSTETRICS & GYNECOLOGY

## 2023-06-07 PROCEDURE — 6360000002 HC RX W HCPCS: Performed by: NURSE ANESTHETIST, CERTIFIED REGISTERED

## 2023-06-07 PROCEDURE — 2500000003 HC RX 250 WO HCPCS: Performed by: NURSE ANESTHETIST, CERTIFIED REGISTERED

## 2023-06-07 PROCEDURE — 6370000000 HC RX 637 (ALT 250 FOR IP): Performed by: OBSTETRICS & GYNECOLOGY

## 2023-06-07 PROCEDURE — 85025 COMPLETE CBC W/AUTO DIFF WBC: CPT

## 2023-06-07 PROCEDURE — 7100000000 HC PACU RECOVERY - FIRST 15 MIN: Performed by: OBSTETRICS & GYNECOLOGY

## 2023-06-07 PROCEDURE — 88342 IMHCHEM/IMCYTCHM 1ST ANTB: CPT | Performed by: PATHOLOGY

## 2023-06-07 PROCEDURE — 7100000010 HC PHASE II RECOVERY - FIRST 15 MIN: Performed by: OBSTETRICS & GYNECOLOGY

## 2023-06-07 PROCEDURE — 2500000003 HC RX 250 WO HCPCS: Performed by: OBSTETRICS & GYNECOLOGY

## 2023-06-07 PROCEDURE — 88309 TISSUE EXAM BY PATHOLOGIST: CPT | Performed by: PATHOLOGY

## 2023-06-07 PROCEDURE — 6360000002 HC RX W HCPCS: Performed by: OBSTETRICS & GYNECOLOGY

## 2023-06-07 PROCEDURE — 2709999900 HC NON-CHARGEABLE SUPPLY: Performed by: OBSTETRICS & GYNECOLOGY

## 2023-06-07 RX ORDER — OXYCODONE HYDROCHLORIDE 5 MG/1
5 TABLET ORAL PRN
Status: COMPLETED | OUTPATIENT
Start: 2023-06-07 | End: 2023-06-07

## 2023-06-07 RX ORDER — HYDROCODONE BITARTRATE AND ACETAMINOPHEN 5; 325 MG/1; MG/1
1 TABLET ORAL EVERY 6 HOURS PRN
Qty: 20 TABLET | Refills: 0 | Status: SHIPPED | OUTPATIENT
Start: 2023-06-07 | End: 2023-06-12

## 2023-06-07 RX ORDER — FENTANYL CITRATE 50 UG/ML
INJECTION, SOLUTION INTRAMUSCULAR; INTRAVENOUS PRN
Status: DISCONTINUED | OUTPATIENT
Start: 2023-06-07 | End: 2023-06-07 | Stop reason: SDUPTHER

## 2023-06-07 RX ORDER — CELECOXIB 200 MG/1
200 CAPSULE ORAL ONCE
Status: COMPLETED | OUTPATIENT
Start: 2023-06-07 | End: 2023-06-07

## 2023-06-07 RX ORDER — ACETAMINOPHEN 500 MG
1000 TABLET ORAL ONCE
Status: COMPLETED | OUTPATIENT
Start: 2023-06-07 | End: 2023-06-07

## 2023-06-07 RX ORDER — DEXAMETHASONE SODIUM PHOSPHATE 4 MG/ML
INJECTION, SOLUTION INTRA-ARTICULAR; INTRALESIONAL; INTRAMUSCULAR; INTRAVENOUS; SOFT TISSUE PRN
Status: DISCONTINUED | OUTPATIENT
Start: 2023-06-07 | End: 2023-06-07 | Stop reason: SDUPTHER

## 2023-06-07 RX ORDER — SODIUM CHLORIDE 0.9 % (FLUSH) 0.9 %
5-40 SYRINGE (ML) INJECTION PRN
Status: DISCONTINUED | OUTPATIENT
Start: 2023-06-07 | End: 2023-06-07 | Stop reason: HOSPADM

## 2023-06-07 RX ORDER — BUPIVACAINE HYDROCHLORIDE AND EPINEPHRINE 5; 5 MG/ML; UG/ML
INJECTION, SOLUTION EPIDURAL; INTRACAUDAL; PERINEURAL
Status: COMPLETED | OUTPATIENT
Start: 2023-06-07 | End: 2023-06-07

## 2023-06-07 RX ORDER — SODIUM CHLORIDE 0.9 % (FLUSH) 0.9 %
5-40 SYRINGE (ML) INJECTION EVERY 12 HOURS SCHEDULED
Status: DISCONTINUED | OUTPATIENT
Start: 2023-06-07 | End: 2023-06-07 | Stop reason: HOSPADM

## 2023-06-07 RX ORDER — SODIUM CHLORIDE, SODIUM LACTATE, POTASSIUM CHLORIDE, CALCIUM CHLORIDE 600; 310; 30; 20 MG/100ML; MG/100ML; MG/100ML; MG/100ML
INJECTION, SOLUTION INTRAVENOUS CONTINUOUS
Status: DISCONTINUED | OUTPATIENT
Start: 2023-06-07 | End: 2023-06-07 | Stop reason: HOSPADM

## 2023-06-07 RX ORDER — MIDAZOLAM HYDROCHLORIDE 1 MG/ML
INJECTION INTRAMUSCULAR; INTRAVENOUS PRN
Status: DISCONTINUED | OUTPATIENT
Start: 2023-06-07 | End: 2023-06-07 | Stop reason: SDUPTHER

## 2023-06-07 RX ORDER — LIDOCAINE HYDROCHLORIDE 20 MG/ML
INJECTION, SOLUTION INFILTRATION; PERINEURAL PRN
Status: DISCONTINUED | OUTPATIENT
Start: 2023-06-07 | End: 2023-06-07 | Stop reason: SDUPTHER

## 2023-06-07 RX ORDER — EPHEDRINE SULFATE 50 MG/ML
INJECTION INTRAVENOUS PRN
Status: DISCONTINUED | OUTPATIENT
Start: 2023-06-07 | End: 2023-06-07 | Stop reason: SDUPTHER

## 2023-06-07 RX ORDER — PROPOFOL 10 MG/ML
INJECTION, EMULSION INTRAVENOUS PRN
Status: DISCONTINUED | OUTPATIENT
Start: 2023-06-07 | End: 2023-06-07 | Stop reason: SDUPTHER

## 2023-06-07 RX ORDER — ONDANSETRON 2 MG/ML
INJECTION INTRAMUSCULAR; INTRAVENOUS PRN
Status: DISCONTINUED | OUTPATIENT
Start: 2023-06-07 | End: 2023-06-07 | Stop reason: SDUPTHER

## 2023-06-07 RX ORDER — FENTANYL CITRATE 50 UG/ML
50 INJECTION, SOLUTION INTRAMUSCULAR; INTRAVENOUS EVERY 5 MIN PRN
Status: DISCONTINUED | OUTPATIENT
Start: 2023-06-07 | End: 2023-06-07 | Stop reason: HOSPADM

## 2023-06-07 RX ORDER — GINSENG 100 MG
CAPSULE ORAL
Qty: 28 G | Refills: 0 | Status: SHIPPED | OUTPATIENT
Start: 2023-06-07

## 2023-06-07 RX ORDER — OXYCODONE HYDROCHLORIDE 5 MG/1
10 TABLET ORAL PRN
Status: COMPLETED | OUTPATIENT
Start: 2023-06-07 | End: 2023-06-07

## 2023-06-07 RX ORDER — SODIUM CHLORIDE 9 MG/ML
INJECTION, SOLUTION INTRAVENOUS PRN
Status: DISCONTINUED | OUTPATIENT
Start: 2023-06-07 | End: 2023-06-07 | Stop reason: HOSPADM

## 2023-06-07 RX ORDER — SODIUM CHLORIDE 9 MG/ML
INJECTION, SOLUTION INTRAVENOUS CONTINUOUS
Status: DISCONTINUED | OUTPATIENT
Start: 2023-06-07 | End: 2023-06-07 | Stop reason: HOSPADM

## 2023-06-07 RX ADMIN — CELECOXIB 200 MG: 200 CAPSULE ORAL at 12:03

## 2023-06-07 RX ADMIN — ACETAMINOPHEN 1000 MG: 500 TABLET ORAL at 12:03

## 2023-06-07 RX ADMIN — PROPOFOL 150 MG: 10 INJECTION, EMULSION INTRAVENOUS at 12:48

## 2023-06-07 RX ADMIN — MIDAZOLAM 2 MG: 1 INJECTION INTRAMUSCULAR; INTRAVENOUS at 12:39

## 2023-06-07 RX ADMIN — DEXAMETHASONE SODIUM PHOSPHATE 8 MG: 4 INJECTION, SOLUTION INTRAMUSCULAR; INTRAVENOUS at 13:00

## 2023-06-07 RX ADMIN — EPHEDRINE SULFATE 10 MG: 50 INJECTION INTRAVENOUS at 13:04

## 2023-06-07 RX ADMIN — CEFAZOLIN 2000 MG: 2 INJECTION, POWDER, FOR SOLUTION INTRAMUSCULAR; INTRAVENOUS at 12:05

## 2023-06-07 RX ADMIN — OXYCODONE HYDROCHLORIDE 5 MG: 5 TABLET ORAL at 14:46

## 2023-06-07 RX ADMIN — EPHEDRINE SULFATE 10 MG: 50 INJECTION INTRAVENOUS at 13:11

## 2023-06-07 RX ADMIN — LIDOCAINE HYDROCHLORIDE 100 MG: 20 INJECTION, SOLUTION INFILTRATION; PERINEURAL at 12:48

## 2023-06-07 RX ADMIN — ONDANSETRON 4 MG: 2 INJECTION INTRAMUSCULAR; INTRAVENOUS at 13:00

## 2023-06-07 RX ADMIN — EPHEDRINE SULFATE 10 MG: 50 INJECTION INTRAVENOUS at 13:16

## 2023-06-07 RX ADMIN — SODIUM CHLORIDE, POTASSIUM CHLORIDE, SODIUM LACTATE AND CALCIUM CHLORIDE: 600; 310; 30; 20 INJECTION, SOLUTION INTRAVENOUS at 12:01

## 2023-06-07 RX ADMIN — FENTANYL CITRATE 50 MCG: 50 INJECTION, SOLUTION INTRAMUSCULAR; INTRAVENOUS at 13:00

## 2023-06-07 ASSESSMENT — PAIN SCALES - GENERAL
PAINLEVEL_OUTOF10: 0
PAINLEVEL_OUTOF10: 6
PAINLEVEL_OUTOF10: 4

## 2023-06-07 ASSESSMENT — PAIN DESCRIPTION - DESCRIPTORS
DESCRIPTORS: BURNING;THROBBING
DESCRIPTORS: THROBBING

## 2023-06-07 ASSESSMENT — PAIN - FUNCTIONAL ASSESSMENT
PAIN_FUNCTIONAL_ASSESSMENT: ACTIVITIES ARE NOT PREVENTED
PAIN_FUNCTIONAL_ASSESSMENT: 0-10
PAIN_FUNCTIONAL_ASSESSMENT: ACTIVITIES ARE NOT PREVENTED

## 2023-06-07 ASSESSMENT — PAIN DESCRIPTION - LOCATION
LOCATION: VULVA
LOCATION: VULVA

## 2023-06-07 ASSESSMENT — LIFESTYLE VARIABLES: SMOKING_STATUS: 1

## 2023-06-07 NOTE — DISCHARGE INSTRUCTIONS
normal and caused by the gas injected into the abdomen. The gas is inserted to create a working and viewing space inside the abdomen during surgery. No intercourse, douching or tampons for 7 days. Expect some vaginal bleeding   Take pain medication as directed. You may resume your normal daily activities as tolerated. Resume your normal diet. Try to avoid constipation by eating high fiber foods or adding a fiber supplement such as Metamucil, Fibercon, or Benefiber; especially while taking a narcotic pain medication.

## 2023-06-07 NOTE — PROGRESS NOTES
1336: Arrived to PACU from OR. Monitors applied, alarms on. Report obtained from [facility] and Katie Benson. Incision x 5, each with bacitracin ointment. 1400: Wakeful, ice chips given, repositioned in bed, warm blankets on, denies discomfort. 1410: Transported to [facility].

## 2023-06-07 NOTE — OP NOTE
Department of Gynecology   Operative Report        Pre-operative Diagnosis: Multiple vulvar hyperpigmented growing nodules    Post-operative Diagnosis: Same    Procedure: Excision of multiple vulvar hyperpigmented growing nodule/partial pelvic    Surgeon: Amanda Stringer MD     Assistant(s):  none    Anesthesia:  General Laryngeal Mask Airway Anesthesia    Findings: 4 hyperpigmented nodules on the right vulva. 1 hyperpigmented nodule on the left vulvar. 1.  Excisional biopsy at 7:00   measured 3.4 x 2 cm 2. Excisional biopsy at 8:00 measuring 3.4 cm x 2 cm. 3.  Excisional biopsy at 10:00 measured 3.2 cm x 1.8 cm.  4.  Excisional biopsy at 11:00 extending 12:00 measured 8.0 cm x 2.5 cm. 5. excisional biopsy at 3:00 measured 5.1 cm x 2.2 cm    Estimated blood loss: 1    Blood Transfusion?:  No     Drains:  none    Specimens: 1. Excisional biopsy at 7:00 2. Excisional biopsy at 8:00 3. Excisional biopsy at 10:00 4. Excisional biopsy at 11:00 extending 12:00 5. excisional biopsy at 7:31    Complications:  none    Condition: Stable    Operative report: After ultrasounds obtained patient was brought to the operating suite where anesthesia team anesthesia without complication. Patient was placed in the lithotomy position and prepped and draped in the normal sterile fashion. The vagina and perineum were prepped with Betadine in the normal standard fashion. All 5 lesions sites were infiltrated with 0.5% Marcaine with epinephrine. Elliptical incisions were made at the lesion site with sizes as noted above. Incisions were closed with 3-0 Monocryl in a subcuticular fashion. Bacitracin was applied.       Gonzalo Boykin MD 6/7/2023 1:52 PM

## 2023-06-07 NOTE — PROGRESS NOTES
Pt returned to room from pacu    Report received from [facility], call light placed within reach, side rails up x's 2  1425 ice applied to andrew area  1440 pt c/o pain in andrew area  1445 po pain med given  1500 explained to pt and daughter how to use sitz bath & Discharge instructions given,  both voiced understanding. 1520 assisted pt up in room to get dressed  0 Pt escorted to main entrance via wheelchair for discharge with daughter.

## 2023-06-07 NOTE — ANESTHESIA PRE PROCEDURE
failure) ([facility]) I50.9    Varicose veins of both legs with edema I83.893    Obesity E66.9    Abnormal nuclear stress test R94.39    Bilateral carpal tunnel syndrome G56.03    Chronic systolic congestive heart failure ([facility]) I50.22    Hypokalemia E87.6    Urge incontinence of urine N39.41    Ischemic chest pain ([facility]) I20.9    Unstable angina ([facility]) I20.0    NSTEMI (non-ST elevated myocardial infarction) ([facility]) I21.4    Chronic obstructive pulmonary disease ([facility]) J44.9    Non morbid obesity due to excess calories E66.09    LV (left ventricular) mural thrombus I51.3    CAD (coronary artery disease) I25.10    Dyslipidemia E78.5    Pseudobulbar affect F48.2    SVT (supraventricular tachycardia) ([facility]) I47.1    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 1XXA    Right hemiparesis ([facility] [address] G81.91    Hypertension, essential, benign I10    Allergic reaction T78.40XA    Hallux valgus (acquired), right foot M20.11    Hammertoe of right foot M20.41    Raynaud's disease without gangrene I73.00    Moderate vascular dementia without behavioral disturbance, psychotic disturbance, mood disturbance, or anxiety ([facility]) F01. B0    Moderate episode of recurrent major depressive disorder ([facility]) F33.1       Past Medical History:        Diagnosis Date    Abnormal nuclear stress test     ACS (acute coronary syndrome) ([facility])     Arterial ischemic stroke, MCA (middle cerebral artery), left, acute ([facility])     Benign mole     CAD (coronary artery disease)     CHF (congestive heart failure) ([facility])     COPD (chronic obstructive pulmonary disease) ([facility])     CVA (cerebral vascular accident) ([facility] [address] 04/06/2014    H/O cardiovascular stress test 05/19/2016    treadmill    History of left heart catheterization 04/17/2016    Severe 2 vessel disease, but 3 vessel disease. 2.5x30 Resolute Stent placed to the LAD. Successful angio-seal deployment w/ excellent results.      History of stress test 03/05/2021
Evaluation  Patient summary reviewed  Airway: Mallampati: II  TM distance: >3 FB   Neck ROM: full  Mouth opening: > = 3 FB   Dental: normal exam         Pulmonary:normal exam    (+) COPD:  current smoker                           Cardiovascular:    (+) hypertension:, past MI: > 6 months, CAD (treated medically):, dysrhythmias: SVT, CHF (HFrEF 45%):, hyperlipidemia               ROS comment: Echo 3/2021:  Summary   Left ventricular function is low normal, EF is estimated at 40-45%. Mild left ventricular hypertrophy. E/A reversal; indeterminate diastolic function. Hypokinesis of apical septum,apical cap,apical lateral wall. Mild mitral and tricuspid regurgitation is present. RVSP is 22 mmHg. No evidence of pericardial effusion. Stress test 3/00694:  Summary   Abnormal Study. Large apical MI, involving adelso apical & Infero apical portions. No   reversible ischemia   Apical Hypokinesis with preserved LV function LVEF is 53 %   Supervising physician Dr. Denise Hines . Neuro/Psych:   (+) CVA:, depression/anxiety             GI/Hepatic/Renal:   (+) morbid obesity          Endo/Other:    (+) hypothyroidism::., .                 Abdominal: normal exam            Vascular: negative vascular ROS. Other Findings:           Anesthesia Plan      general     ASA 3       Induction: intravenous. Plan discussed with CRNA. Attending anesthesiologist reviewed and agrees with Preprocedure content                Kaelyn Isaac MD   6/7/2023         Pre Anesthesia Assessment complete.  Chart reviewed on 6/7/2023

## 2023-06-07 NOTE — H&P
Symptoms  Flushing. Shortness of breath. Dizziness. Nausea. Complications  Procedure complication was none. Stress Interpretation  The stress ECG was non-diagnostic secondary to baseline abnormalities. Procedure Medications   - Lexiscan I.V. bolus (over 15sec.) 0.4 mg admininstered @ 06/06/2023 10:35.   - Aminophylline I.V. 50 mg admininstered @ 06/06/2023 10:37. Imaging Protocols   Rest                                Stress   Isotope:Sestamibi 99mTc             Isotope: Sestamibi 99mTc  Isotope dose:8.9 mCi                Isotope dose:28.1 mCi  Administration route: I.V. Lt. arm  Administration route: I.V. Lt. arm  Injection Date:06/06/2023 09:35     Injection Date:06/06/2023 10:35  Scan Date:06/06/2023 10:05          Scan Date:06/06/2023 11:05   Technique:         SPECT            Technique:          Gated                                                          SPECT  Imaging Results Visualization: Good  Rest ejection  Ejection fraction:45 %  EDV :107 ml  ESV :59 ml  Stroke volume :48 ml  Medical History   Accession#:  [id number]  Admission Data Admission date: 06/06/2023 Admission Time: 09:28 Hospital Status: Outpatient.       Labs:  Results for orders placed or performed in visit on 06/06/23   NM MYOCARDIAL SPECT REST EXERCISE OR RX   Result Value Ref Range    Left Ventricular Ejection Fraction 45     LVEF MODALITY Nuclear            ASSESSMENT:  Vulvar lesions    Patient Active Problem List    Diagnosis Date Noted    Hematoma of groin      Priority: High    S/P ablation of accessory bypass tract 02/12/2018     Priority: High    SVT (supraventricular tachycardia) (HCC)      Priority: High    Hallux valgus (acquired), right foot 04/15/2022     Priority: Medium    Hammertoe of right foot 04/15/2022     Priority: Medium    Raynaud's disease without gangrene 05/12/2023    Moderate vascular dementia without behavioral disturbance, psychotic disturbance, mood disturbance, or anxiety ([facility] [address] 05/12/2023

## 2023-06-08 NOTE — ANESTHESIA POSTPROCEDURE EVALUATION
Department of Anesthesiology  Postprocedure Note    Patient: Heather Jiang  MRN: [id number]  YOB: 1960  Date of evaluation: 6/8/2023      Procedure Summary     Date: 06/07/23 Room / Location: [address]    Anesthesia Start: 9871 Anesthesia Stop: 0897    Procedure: PARTIAL VULVECTOMY (Vagina ) Diagnosis:       Vulvar lesion      (Vulvar lesion [N90.89])    Surgeons: Ming Lott MD Responsible Provider: Tiffanie Jacobs MD    Anesthesia Type: general ASA Status: 3          Anesthesia Type: No value filed.     Arianna Phase I: Arianna Score: 8    Arianna Phase II: Arianna Score: 10      Anesthesia Post Evaluation    Patient location during evaluation: bedside  Patient participation: complete - patient participated  Level of consciousness: awake  Pain score: 0  Airway patency: patent  Nausea & Vomiting: no nausea and no vomiting  Complications: no  Cardiovascular status: hemodynamically stable  Respiratory status: acceptable  Hydration status: euvolemic

## 2023-06-13 PROBLEM — N90.89 VULVAR LESION: Status: ACTIVE | Noted: 2023-06-13

## 2023-06-13 PROBLEM — D39.9 NEOPLASM OF UNCERTAIN BEHAVIOR OF FEMALE GENITAL ORGANS: Status: ACTIVE | Noted: 2023-06-13

## 2023-06-20 ENCOUNTER — OFFICE VISIT (OUTPATIENT)
Dept: OBGYN | Age: 63
End: 2023-06-20

## 2023-06-20 VITALS
HEIGHT: 64 IN | DIASTOLIC BLOOD PRESSURE: 71 MMHG | SYSTOLIC BLOOD PRESSURE: 114 MMHG | BODY MASS INDEX: 32.61 KG/M2 | HEART RATE: 98 BPM | WEIGHT: 191 LBS

## 2023-06-20 DIAGNOSIS — Z09 POSTOPERATIVE EXAMINATION: Primary | ICD-10-CM

## 2023-06-20 PROCEDURE — 99024 POSTOP FOLLOW-UP VISIT: CPT | Performed by: OBSTETRICS & GYNECOLOGY

## 2023-06-20 NOTE — PROGRESS NOTES
6/20/23    Jean-Claude Daugherty  1960    Chief Complaint   Patient presents with    Post-Op Check     Pt here for post-op, partial vulvectomy 6/7/2023 d/t vulvar lesion. No c/o today. Jean-Claude Daugherty is a 63 y.o. female who presents today for evaluation of postop check. Past Medical History:   Diagnosis Date    Abnormal nuclear stress test     ACS (acute coronary syndrome) (HCC)     Arterial ischemic stroke, MCA (middle cerebral artery), left, acute (HCC)     Benign mole     CAD (coronary artery disease)     CHF (congestive heart failure) (HCC)     COPD (chronic obstructive pulmonary disease) ([facility])     CVA (cerebral vascular accident) ([facility] [address] 04/06/2014    H/O cardiovascular stress test 05/19/2016    treadmill    History of left heart catheterization 04/17/2016    Severe 2 vessel disease, but 3 vessel disease. 2.5x30 Resolute Stent placed to the LAD. Successful angio-seal deployment w/ excellent results. History of stress test 03/05/2021    Large apical MI, involving adelso apical & Infero apical portions. No reversible ischemia  Apical Hypokinesis with preserved LV function LVEF is 53 %    Hx of cardiovascular stress test 06/22/2015    lexiscan-scar,EF47%    Hx of echocardiogram 04/08/2014    VALDEMAR: EF 40-45%. Right and left atrium/ventricles are normal. Mitral/Tricuspid valves normal. Mild aortic va;lve stenosis. Hx of echocardiogram 04/18/2016    EF 35-40%. Borderline LA enlargement. 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 Mild MR/TR with trace aortic insufficiency. Mild pulmonary HTN. Hx of echocardiogram 03/05/2021    at 40-45%,E/A reversal; indeterminate diastolic function. Hypokinesis of apical septum,apical cap,apical lateral wall. Mild mitral and tricuspid regurgitation is present.     Hyperlipemia     Hypertension, essential, benign     Nicotine dependence     quit 2014    NSTEMI (non-ST elevated myocardial infarction) ([facility] [address]

## 2023-06-26 ENCOUNTER — TELEPHONE (OUTPATIENT)
Dept: FAMILY MEDICINE CLINIC | Age: 63
End: 2023-06-26

## 2023-06-26 ENCOUNTER — OFFICE VISIT (OUTPATIENT)
Dept: FAMILY MEDICINE CLINIC | Age: 63
End: 2023-06-26
Payer: MEDICAID

## 2023-06-26 VITALS
DIASTOLIC BLOOD PRESSURE: 92 MMHG | TEMPERATURE: 96.6 F | OXYGEN SATURATION: 92 % | SYSTOLIC BLOOD PRESSURE: 146 MMHG | HEART RATE: 109 BPM

## 2023-06-26 DIAGNOSIS — J44.1 CHRONIC OBSTRUCTIVE PULMONARY DISEASE WITH ACUTE EXACERBATION (HCC): Primary | ICD-10-CM

## 2023-06-26 PROCEDURE — 3080F DIAST BP >= 90 MM HG: CPT | Performed by: FAMILY MEDICINE

## 2023-06-26 PROCEDURE — 99214 OFFICE O/P EST MOD 30 MIN: CPT | Performed by: FAMILY MEDICINE

## 2023-06-26 PROCEDURE — 94640 AIRWAY INHALATION TREATMENT: CPT | Performed by: FAMILY MEDICINE

## 2023-06-26 PROCEDURE — 4004F PT TOBACCO SCREEN RCVD TLK: CPT | Performed by: FAMILY MEDICINE

## 2023-06-26 PROCEDURE — G8417 CALC BMI ABV UP PARAM F/U: HCPCS | Performed by: FAMILY MEDICINE

## 2023-06-26 PROCEDURE — 3017F COLORECTAL CA SCREEN DOC REV: CPT | Performed by: FAMILY MEDICINE

## 2023-06-26 PROCEDURE — 3023F SPIROM DOC REV: CPT | Performed by: FAMILY MEDICINE

## 2023-06-26 PROCEDURE — 3077F SYST BP >= 140 MM HG: CPT | Performed by: FAMILY MEDICINE

## 2023-06-26 PROCEDURE — G8428 CUR MEDS NOT DOCUMENT: HCPCS | Performed by: FAMILY MEDICINE

## 2023-06-26 RX ORDER — PREDNISONE 20 MG/1
40 TABLET ORAL DAILY
Qty: 10 TABLET | Refills: 0 | Status: SHIPPED | OUTPATIENT
Start: 2023-06-26

## 2023-06-26 RX ORDER — ALBUTEROL SULFATE 2.5 MG/3ML
2.5 SOLUTION RESPIRATORY (INHALATION) EVERY 4 HOURS PRN
Qty: 120 EACH | Refills: 3 | Status: SHIPPED | OUTPATIENT
Start: 2023-06-26

## 2023-06-26 RX ORDER — PREGABALIN 100 MG/1
100 CAPSULE ORAL 3 TIMES DAILY
COMMUNITY
Start: 2023-05-25

## 2023-06-26 RX ORDER — LEVOFLOXACIN 500 MG/1
500 TABLET, FILM COATED ORAL DAILY
Qty: 7 TABLET | Refills: 0 | Status: SHIPPED | OUTPATIENT
Start: 2023-06-26 | End: 2023-07-03

## 2023-07-25 ENCOUNTER — OFFICE VISIT (OUTPATIENT)
Dept: OBGYN | Age: 63
End: 2023-07-25
Payer: MEDICAID

## 2023-07-25 VITALS
HEIGHT: 64 IN | SYSTOLIC BLOOD PRESSURE: 134 MMHG | DIASTOLIC BLOOD PRESSURE: 81 MMHG | WEIGHT: 190 LBS | BODY MASS INDEX: 32.44 KG/M2

## 2023-07-25 DIAGNOSIS — A63.0 GENITAL CONDYLOMA, FEMALE: Primary | ICD-10-CM

## 2023-07-25 DIAGNOSIS — E66.9 OBESITY (BMI 30.0-34.9): ICD-10-CM

## 2023-07-25 PROCEDURE — 3017F COLORECTAL CA SCREEN DOC REV: CPT | Performed by: OBSTETRICS & GYNECOLOGY

## 2023-07-25 PROCEDURE — 3079F DIAST BP 80-89 MM HG: CPT | Performed by: OBSTETRICS & GYNECOLOGY

## 2023-07-25 PROCEDURE — G8417 CALC BMI ABV UP PARAM F/U: HCPCS | Performed by: OBSTETRICS & GYNECOLOGY

## 2023-07-25 PROCEDURE — 3075F SYST BP GE 130 - 139MM HG: CPT | Performed by: OBSTETRICS & GYNECOLOGY

## 2023-07-25 PROCEDURE — 4004F PT TOBACCO SCREEN RCVD TLK: CPT | Performed by: OBSTETRICS & GYNECOLOGY

## 2023-07-25 PROCEDURE — 99214 OFFICE O/P EST MOD 30 MIN: CPT | Performed by: OBSTETRICS & GYNECOLOGY

## 2023-07-25 PROCEDURE — G8427 DOCREV CUR MEDS BY ELIG CLIN: HCPCS | Performed by: OBSTETRICS & GYNECOLOGY

## 2023-07-25 RX ORDER — METFORMIN HYDROCHLORIDE 500 MG/1
500 TABLET, EXTENDED RELEASE ORAL
Qty: 90 TABLET | Refills: 3 | Status: SHIPPED | OUTPATIENT
Start: 2023-07-25 | End: 2024-07-19

## 2023-07-25 RX ORDER — IMIQUIMOD 12.5 MG/.25G
CREAM TOPICAL
Qty: 12 EACH | Refills: 2 | Status: SHIPPED | OUTPATIENT
Start: 2023-07-25 | End: 2023-08-01

## 2023-08-15 ENCOUNTER — OFFICE VISIT (OUTPATIENT)
Dept: CARDIOLOGY CLINIC | Age: 63
End: 2023-08-15
Payer: MEDICAID

## 2023-08-15 VITALS
DIASTOLIC BLOOD PRESSURE: 66 MMHG | WEIGHT: 185 LBS | BODY MASS INDEX: 34.04 KG/M2 | SYSTOLIC BLOOD PRESSURE: 114 MMHG | HEIGHT: 62 IN | HEART RATE: 74 BPM

## 2023-08-15 DIAGNOSIS — E78.5 DYSLIPIDEMIA: ICD-10-CM

## 2023-08-15 DIAGNOSIS — E66.09 NON MORBID OBESITY DUE TO EXCESS CALORIES: ICD-10-CM

## 2023-08-15 DIAGNOSIS — I25.110 CORONARY ARTERY DISEASE INVOLVING NATIVE CORONARY ARTERY OF NATIVE HEART WITH UNSTABLE ANGINA PECTORIS (HCC): Primary | ICD-10-CM

## 2023-08-15 DIAGNOSIS — I51.3 LV (LEFT VENTRICULAR) MURAL THROMBUS: ICD-10-CM

## 2023-08-15 DIAGNOSIS — I83.893 VARICOSE VEINS OF BOTH LEGS WITH EDEMA: ICD-10-CM

## 2023-08-15 DIAGNOSIS — I50.32 CHRONIC DIASTOLIC CONGESTIVE HEART FAILURE (HCC): ICD-10-CM

## 2023-08-15 DIAGNOSIS — I47.1 SVT (SUPRAVENTRICULAR TACHYCARDIA) (HCC): ICD-10-CM

## 2023-08-15 DIAGNOSIS — F17.200 TOBACCO USE DISORDER: ICD-10-CM

## 2023-08-15 DIAGNOSIS — Z98.890 S/P ABLATION OF ACCESSORY BYPASS TRACT: ICD-10-CM

## 2023-08-15 DIAGNOSIS — I10 HYPERTENSION, ESSENTIAL, BENIGN: ICD-10-CM

## 2023-08-15 DIAGNOSIS — I21.4 NSTEMI (NON-ST ELEVATED MYOCARDIAL INFARCTION) (HCC): ICD-10-CM

## 2023-08-15 PROCEDURE — G8427 DOCREV CUR MEDS BY ELIG CLIN: HCPCS | Performed by: INTERNAL MEDICINE

## 2023-08-15 PROCEDURE — 3078F DIAST BP <80 MM HG: CPT | Performed by: INTERNAL MEDICINE

## 2023-08-15 PROCEDURE — G8417 CALC BMI ABV UP PARAM F/U: HCPCS | Performed by: INTERNAL MEDICINE

## 2023-08-15 PROCEDURE — 3074F SYST BP LT 130 MM HG: CPT | Performed by: INTERNAL MEDICINE

## 2023-08-15 PROCEDURE — 99213 OFFICE O/P EST LOW 20 MIN: CPT | Performed by: INTERNAL MEDICINE

## 2023-08-15 PROCEDURE — 3017F COLORECTAL CA SCREEN DOC REV: CPT | Performed by: INTERNAL MEDICINE

## 2023-08-15 PROCEDURE — 4004F PT TOBACCO SCREEN RCVD TLK: CPT | Performed by: INTERNAL MEDICINE

## 2023-08-15 RX ORDER — LOSARTAN POTASSIUM 25 MG/1
25 TABLET ORAL DAILY
Qty: 30 TABLET | Refills: 5 | Status: SHIPPED | OUTPATIENT
Start: 2023-08-15

## 2023-08-15 RX ORDER — ATORVASTATIN CALCIUM 40 MG/1
40 TABLET, FILM COATED ORAL DAILY
Qty: 90 TABLET | Refills: 3 | Status: SHIPPED | OUTPATIENT
Start: 2023-08-15

## 2023-08-15 RX ORDER — CARVEDILOL 12.5 MG/1
12.5 TABLET ORAL 2 TIMES DAILY
Qty: 180 TABLET | Refills: 3 | Status: SHIPPED | OUTPATIENT
Start: 2023-08-15

## 2023-08-15 NOTE — PROGRESS NOTES
Johnathan Silva is a 63 y.o. female who has    CHIEF COMPLAINT AS FOLLOWS:  CHEST PAIN:  Patient denies any C/O chest pains at this time. SOB:  C/O SOB at this time. Had some kind of infection end of June or beginning of July. LEG EDEMA: B/L Lower extremity edema is present but better than before. PALPITATIONS: Denies any C/O Palpitations   DIZZINESS: No C/O Dizziness   SYNCOPE: None   OTHER/ ADDITIONAL COMPLAINTS:                                     HPI: Patient is here for F/U on her CAD, CMP, CHF,  Arrhythmia, HTN & Dyslipidemia problems. CAD: Patient has known CAD. Had angioplasty in the past.  CMP: Patient has known  ischemic. Being treated with guideline recommended medical / device therapy as stated below. CHF: Patient has known systolic (HFrEF). Patient had been managed with medical regimen as stated below. Arrhythmia: Patient has known  Supraventricular arrhythmia in the past.  HTN: Patient has known essential HTN. Has been treated with guideline recommended medical / physical/ diet therapy as stated below. Dyslipidemia: Patient has known mixed dyslipidemia. Has been treated with guideline recommended medical / physical/ diet therapy as stated below. Current Outpatient Medications   Medication Sig Dispense Refill    atorvastatin (LIPITOR) 40 MG tablet Take 1 tablet by mouth daily 90 tablet 3    losartan (COZAAR) 25 MG tablet Take 1 tablet by mouth daily take 1 tablet by mouth once daily 30 tablet 5    metFORMIN (GLUCOPHAGE-XR) 500 MG extended release tablet Take 1 tablet by mouth daily (with breakfast) 90 tablet 3    predniSONE (DELTASONE) 20 MG tablet Take 2 tablets by mouth daily 10 tablet 0    pregabalin (LYRICA) 100 MG capsule Take 1 capsule by mouth 3 times daily.       albuterol (PROVENTIL) (2.5 MG/3ML) 0.083% nebulizer solution Take 3 mLs by nebulization every 4 hours as needed for Wheezing 120 each 3    bacitracin 500 UNIT/GM ointment Apply

## 2023-08-15 NOTE — PATIENT INSTRUCTIONS
CORONARY ARTERY DISEASE:Yes   clinically stable. Patient is on optimal medical regimen ( see medication list above )  - Patient is currently  asymptomatic from CAD. - changes in  treatment:   no, On asa & Coreg           - Testing ordered:  no  [place] classification: 1  CARDIOLITE  4/5/2021    Large apical MI, involving adelso apical & Infero apical portions. No    reversible ischemia    Apical Hypokinesis with preserved LV function LVEF is 53 %      Cardiolite 6/6/2023   Abnormal study. Large are of apical Infarction, involving adelso apical & Infero apical   portions. There is some reversible ischemia of the Septum but not   significant. LV reveals apical Dyskinesia with mildly reduced LV function. LVEF is 45 %. HTN: Well controlled, on Losartan & Coreg     CARDIOMYOPATHY:  known   CONGESTIVE HEART FAILURE:  KNOWN HISTORY. Compensated. VHD: No significant VHD noted  ECHO 4/5/2021   Left ventricular function is low normal, EF is estimated at 40-45%. Mild left ventricular hypertrophy. E/A reversal; indeterminate diastolic function. Hypokinesis of apical septum,apical cap,apical lateral wall. Mild mitral and tricuspid regurgitation is present. RVSP is 22 mmHg. No evidence of pericardial effusion. DYSLIPIDEMIA: Patient's profile is at / near Goal.yes,                                 HDL is low                                Tolerating current medical regimen wellyes,  takes Lipitor                                                            See most recent Lab values in Labs section above. CVI: Patient has symptomatic, clinically C4 venous disease. Patient to continue to wear compression stockings. 10/2021    No evidence of DVT or SVT in the bilateral common femoral vein, femoral    vein, popliteal vein, greater saphenous vein or small saphenous vein. Significant reflux noted of the Right CFV (1.4s) and GSV SFJ (0.6s).     Significant reflux noted in the Left GSV Mid

## 2023-09-06 ENCOUNTER — PROCEDURE VISIT (OUTPATIENT)
Dept: CARDIOLOGY CLINIC | Age: 63
End: 2023-09-06
Payer: MEDICAID

## 2023-09-06 DIAGNOSIS — I50.32 CHRONIC DIASTOLIC CONGESTIVE HEART FAILURE (HCC): ICD-10-CM

## 2023-09-06 DIAGNOSIS — I25.110 CORONARY ARTERY DISEASE INVOLVING NATIVE CORONARY ARTERY OF NATIVE HEART WITH UNSTABLE ANGINA PECTORIS (HCC): ICD-10-CM

## 2023-09-06 DIAGNOSIS — E66.09 NON MORBID OBESITY DUE TO EXCESS CALORIES: ICD-10-CM

## 2023-09-06 DIAGNOSIS — E78.5 DYSLIPIDEMIA: ICD-10-CM

## 2023-09-06 DIAGNOSIS — I10 HYPERTENSION, ESSENTIAL, BENIGN: ICD-10-CM

## 2023-09-06 DIAGNOSIS — F17.200 TOBACCO USE DISORDER: ICD-10-CM

## 2023-09-06 DIAGNOSIS — I21.4 NSTEMI (NON-ST ELEVATED MYOCARDIAL INFARCTION) (HCC): ICD-10-CM

## 2023-09-06 DIAGNOSIS — I51.3 LV (LEFT VENTRICULAR) MURAL THROMBUS: ICD-10-CM

## 2023-09-06 DIAGNOSIS — I47.1 SVT (SUPRAVENTRICULAR TACHYCARDIA) (HCC): ICD-10-CM

## 2023-09-06 DIAGNOSIS — Z98.890 S/P ABLATION OF ACCESSORY BYPASS TRACT: ICD-10-CM

## 2023-09-06 DIAGNOSIS — I83.893 VARICOSE VEINS OF BOTH LEGS WITH EDEMA: ICD-10-CM

## 2023-09-06 PROCEDURE — 93306 TTE W/DOPPLER COMPLETE: CPT | Performed by: INTERNAL MEDICINE

## 2023-09-13 ENCOUNTER — TELEPHONE (OUTPATIENT)
Dept: CARDIOLOGY CLINIC | Age: 63
End: 2023-09-13

## 2023-09-13 NOTE — TELEPHONE ENCOUNTER
Called patient and provided the following:     Definity utilized to visualize the left ventricle. Left ventricular function is moderately abnormal, EF is estimated at   35-40%. Left ventricle size is normal.   Apical Dyskinesa of the apical septum,apical cap, apical and apical lateral   wall. Mild left ventricular hypertrophy. Grade I diastolic dysfunction. Aortic valve leaflets are somewhat thickened. Mitral annular calcification is present. Mild mitral and mild to moderate aortic regurgitation is present. RVSP is 17 mmHg. No evidence of pericardial effusion.      Scheduled for 09/20/2023 at 1 PM

## 2023-09-19 RX ORDER — ASPIRIN 81 MG/1
81 TABLET ORAL DAILY
Qty: 90 TABLET | Refills: 3 | Status: SHIPPED | OUTPATIENT
Start: 2023-09-19

## 2023-09-20 ENCOUNTER — OFFICE VISIT (OUTPATIENT)
Dept: CARDIOLOGY CLINIC | Age: 63
End: 2023-09-20
Payer: MEDICAID

## 2023-09-20 VITALS
HEART RATE: 84 BPM | SYSTOLIC BLOOD PRESSURE: 136 MMHG | HEIGHT: 64 IN | WEIGHT: 185 LBS | DIASTOLIC BLOOD PRESSURE: 78 MMHG | OXYGEN SATURATION: 100 % | BODY MASS INDEX: 31.58 KG/M2

## 2023-09-20 DIAGNOSIS — I50.22 CHRONIC SYSTOLIC CONGESTIVE HEART FAILURE (HCC): ICD-10-CM

## 2023-09-20 DIAGNOSIS — F17.200 TOBACCO USE DISORDER: ICD-10-CM

## 2023-09-20 DIAGNOSIS — I83.893 VARICOSE VEINS OF BOTH LEGS WITH EDEMA: ICD-10-CM

## 2023-09-20 DIAGNOSIS — I21.4 NSTEMI (NON-ST ELEVATED MYOCARDIAL INFARCTION) (HCC): ICD-10-CM

## 2023-09-20 DIAGNOSIS — E78.5 DYSLIPIDEMIA: ICD-10-CM

## 2023-09-20 DIAGNOSIS — I25.110 CORONARY ARTERY DISEASE INVOLVING NATIVE CORONARY ARTERY OF NATIVE HEART WITH UNSTABLE ANGINA PECTORIS (HCC): Primary | ICD-10-CM

## 2023-09-20 DIAGNOSIS — Z98.890 S/P ABLATION OF ACCESSORY BYPASS TRACT: ICD-10-CM

## 2023-09-20 DIAGNOSIS — I47.1 SVT (SUPRAVENTRICULAR TACHYCARDIA) (HCC): ICD-10-CM

## 2023-09-20 DIAGNOSIS — I10 HYPERTENSION, ESSENTIAL, BENIGN: ICD-10-CM

## 2023-09-20 PROCEDURE — 3078F DIAST BP <80 MM HG: CPT | Performed by: INTERNAL MEDICINE

## 2023-09-20 PROCEDURE — 3017F COLORECTAL CA SCREEN DOC REV: CPT | Performed by: INTERNAL MEDICINE

## 2023-09-20 PROCEDURE — 3075F SYST BP GE 130 - 139MM HG: CPT | Performed by: INTERNAL MEDICINE

## 2023-09-20 PROCEDURE — G8417 CALC BMI ABV UP PARAM F/U: HCPCS | Performed by: INTERNAL MEDICINE

## 2023-09-20 PROCEDURE — 4004F PT TOBACCO SCREEN RCVD TLK: CPT | Performed by: INTERNAL MEDICINE

## 2023-09-20 PROCEDURE — 99214 OFFICE O/P EST MOD 30 MIN: CPT | Performed by: INTERNAL MEDICINE

## 2023-09-20 PROCEDURE — G8427 DOCREV CUR MEDS BY ELIG CLIN: HCPCS | Performed by: INTERNAL MEDICINE

## 2023-09-20 RX ORDER — ASPIRIN 81 MG/1
81 TABLET ORAL DAILY
Qty: 90 TABLET | Refills: 1 | Status: SHIPPED | OUTPATIENT
Start: 2023-09-20

## 2023-09-20 RX ORDER — NICOTINE 21 MG/24HR
1 PATCH, TRANSDERMAL 24 HOURS TRANSDERMAL DAILY
Qty: 42 PATCH | Refills: 0 | Status: SHIPPED | OUTPATIENT
Start: 2023-09-20 | End: 2023-11-01

## 2023-09-20 NOTE — PROGRESS NOTES
3/8/2017    Call Regarding Onboarding [employer]    Attempt 2    Message on voicemail     Comments: DEP- 3      Outreach   CC     (2.5 MG/3ML) 0.083% nebulizer solution Take 3 mLs by nebulization every 4 hours as needed for Wheezing 120 each 3    bacitracin 500 UNIT/GM ointment Apply topically 2 times daily. 28 g 0    amitriptyline (ELAVIL) 100 MG tablet Take 1 tablet by mouth every evening 30 tablet 5    escitalopram (LEXAPRO) 20 MG tablet Take 1 tablet by mouth daily 90 tablet 1    pregabalin (LYRICA) 75 MG capsule Take 1 capsule by mouth 3 times daily. lidocaine (LIDODERM) 5 % as needed      levothyroxine (SYNTHROID) 88 MCG tablet take 1 tablet by mouth once daily 30 tablet 5    vitamin D3 (CHOLECALCIFEROL) 10 MCG (400 UNIT) TABS tablet Take 1 tablet by mouth daily 30 tablet 5    fluticasone (FLONASE) 50 MCG/ACT nasal spray instill 2 sprays into each nostril once daily 16 g 5    mirabegron (MYRBETRIQ) 25 MG TB24 Take 1 tablet by mouth daily 30 tablet 5    Memantine HCl-Donepezil HCl 28-10 MG CP24 Take 1 capsule by mouth daily 30 capsule 5    fluticasone (FLOVENT HFA) 220 MCG/ACT inhaler inhale 2 puffs by mouth and INTO THE LUNGS twice a day 12 g 5    gabapentin (NEURONTIN) 100 MG capsule as needed. calcium carbonate (OYSTER SHELL CALCIUM 500 MG) 1250 (500 Ca) MG tablet Take 1,200 mg by mouth 2 times daily (with meals)      ondansetron (ZOFRAN-ODT) 4 MG disintegrating tablet Take 1 tablet by mouth 3 times daily as needed for Nausea or Vomiting 30 tablet 1    EPINEPHrine (EPIPEN) 0.3 MG/0.3ML SOAJ injection   0    LORazepam (ATIVAN) 0.5 MG tablet every 6 hours as needed. 0    nicotine (NICODERM CQ) 21 MG/24HR Place 1 patch onto the skin daily (Patient not taking: Reported on 8/15/2023) 42 patch 3     No current facility-administered medications for this visit.      Allergies: Lisinopril and Other  Review of Systems:    Constitutional: Negative for diaphoresis and fatigue  Respiratory: Negative for shortness of breath  Cardiovascular: Negative for chest pain, dyspnea on exertion, claudication, edema, irregular heartbeat, murmur,

## 2023-09-20 NOTE — PATIENT INSTRUCTIONS
vein, femoral    vein, popliteal vein, greater saphenous vein or small saphenous vein. Significant reflux noted of the Right CFV (1.4s) and GSV SFJ (0.6s). Significant reflux noted in the Left GSV Mid Thigh (0.5s).      5/2023 US shows:  B/L CFV are normal, no evidence of DVT. B/L GSVs non compressible from SFJ to knee with no evidence of flow. ARRHYTHMIAS: known H/O SVT S/P Ablation    TESTS ORDERED: [facility]     PREVIOUSLY ORDERED TESTS REVIEWED & DISCUSSED WITH THE PATIENT:     I personally reviewed & interpreted, all previously ordered tests as copied above. Latest Labs are pulled in to the note with dates. Labs, specially in Reference to Lipid profile, Cardiac testing in the form of Echo ( dated: ), stress tests ( dated: ) & other relevant cardiac testing reviewed with patient & recommendations made based on assessment of the results. Discussed role of Cardiac risk factors & effects + treatment of co morbidities with patient & advised accordingly. MEDICATIONS: List of medications patient is currently taking is reviewed in detail with the patient. Discussed any side effects or problems taking the medication. Recommend Continue present management & medications as listed. AFFIRMATION: I spent at least 20 minutes of time reviewing patient's history, previous & current medical problems & all Labs + testing. This includes chart prep even prior to the vosit. Various goals are discussed and multiple questions answered. Relevant concelling performed. Office follow up in one month.

## 2023-09-22 DIAGNOSIS — J44.1 CHRONIC OBSTRUCTIVE PULMONARY DISEASE WITH ACUTE EXACERBATION (HCC): ICD-10-CM

## 2023-09-24 RX ORDER — ALBUTEROL SULFATE 2.5 MG/3ML
SOLUTION RESPIRATORY (INHALATION)
Qty: 360 ML | Refills: 1 | Status: SHIPPED | OUTPATIENT
Start: 2023-09-24

## 2023-09-27 DIAGNOSIS — J30.89 PERENNIAL ALLERGIC RHINITIS: ICD-10-CM

## 2023-09-27 DIAGNOSIS — J44.9 CHRONIC OBSTRUCTIVE PULMONARY DISEASE, UNSPECIFIED COPD TYPE (HCC): ICD-10-CM

## 2023-09-27 DIAGNOSIS — E03.4 HYPOTHYROIDISM DUE TO ACQUIRED ATROPHY OF THYROID: ICD-10-CM

## 2023-09-27 DIAGNOSIS — N32.81 OAB (OVERACTIVE BLADDER): ICD-10-CM

## 2023-09-27 RX ORDER — FLUTICASONE PROPIONATE 50 MCG
SPRAY, SUSPENSION (ML) NASAL
Qty: 16 G | Refills: 5 | OUTPATIENT
Start: 2023-09-27

## 2023-09-27 RX ORDER — MIRABEGRON 25 MG/1
25 TABLET, FILM COATED, EXTENDED RELEASE ORAL DAILY
Qty: 30 TABLET | Refills: 5 | OUTPATIENT
Start: 2023-09-27

## 2023-09-27 RX ORDER — OMEGA-3S/DHA/EPA/FISH OIL/D3 300MG-1000
400 CAPSULE ORAL DAILY
Qty: 30 TABLET | Refills: 5 | OUTPATIENT
Start: 2023-09-27

## 2023-09-27 RX ORDER — LEVOTHYROXINE SODIUM 88 UG/1
TABLET ORAL
Qty: 30 TABLET | Refills: 5 | OUTPATIENT
Start: 2023-09-27

## 2023-09-27 RX ORDER — FLUTICASONE PROPIONATE 220 UG/1
AEROSOL, METERED RESPIRATORY (INHALATION)
Qty: 12 G | Refills: 5 | OUTPATIENT
Start: 2023-09-27

## 2023-09-29 ENCOUNTER — TELEPHONE (OUTPATIENT)
Dept: CARDIOLOGY CLINIC | Age: 63
End: 2023-09-29

## 2023-09-29 DIAGNOSIS — F33.1 MODERATE EPISODE OF RECURRENT MAJOR DEPRESSIVE DISORDER (HCC): ICD-10-CM

## 2023-09-29 RX ORDER — ESCITALOPRAM OXALATE 20 MG/1
20 TABLET ORAL DAILY
Qty: 90 TABLET | Refills: 1 | OUTPATIENT
Start: 2023-09-29

## 2023-09-29 NOTE — TELEPHONE ENCOUNTER
I called patient to reschedule an appointment and she voiced her displeasure the way the entresto is being handled. She wants to know if the application for assistance has been sent and why it was not done before. I called Heraclio Jenkins and she will get it sent and also stated there are samples waiting for her. I called patient back and she does not think she should have to drive here for samples. Wants to know if there is another drug that could replace Entresto that would be more accessible. Please call to let her know if there is another possible drug.

## 2023-09-29 NOTE — TELEPHONE ENCOUNTER
Patient called advising that her Kelley Friedman needs PA. Patient has [address] which is a medicaid plan. This requires documentation of NYHA Class. Call to triage to request documentation addendum to OV note on 9/20/23. Will clarify class with Dr. Katya Quintanilla on Monday, 9/29/23. Patient was given samples.

## 2023-10-05 ENCOUNTER — OFFICE VISIT (OUTPATIENT)
Dept: FAMILY MEDICINE CLINIC | Age: 63
End: 2023-10-05
Payer: MEDICAID

## 2023-10-05 VITALS
TEMPERATURE: 95.9 F | HEART RATE: 86 BPM | OXYGEN SATURATION: 97 % | DIASTOLIC BLOOD PRESSURE: 72 MMHG | WEIGHT: 177.6 LBS | SYSTOLIC BLOOD PRESSURE: 114 MMHG | BODY MASS INDEX: 30.48 KG/M2

## 2023-10-05 DIAGNOSIS — J30.89 PERENNIAL ALLERGIC RHINITIS: ICD-10-CM

## 2023-10-05 DIAGNOSIS — F01.B0 MODERATE VASCULAR DEMENTIA WITHOUT BEHAVIORAL DISTURBANCE, PSYCHOTIC DISTURBANCE, MOOD DISTURBANCE, OR ANXIETY (HCC): ICD-10-CM

## 2023-10-05 DIAGNOSIS — E03.4 HYPOTHYROIDISM DUE TO ACQUIRED ATROPHY OF THYROID: Primary | ICD-10-CM

## 2023-10-05 DIAGNOSIS — G81.91 RIGHT HEMIPARESIS (HCC): ICD-10-CM

## 2023-10-05 DIAGNOSIS — N32.81 OAB (OVERACTIVE BLADDER): ICD-10-CM

## 2023-10-05 DIAGNOSIS — J44.9 CHRONIC OBSTRUCTIVE PULMONARY DISEASE, UNSPECIFIED COPD TYPE (HCC): ICD-10-CM

## 2023-10-05 DIAGNOSIS — E78.5 DYSLIPIDEMIA: ICD-10-CM

## 2023-10-05 DIAGNOSIS — F33.1 MODERATE EPISODE OF RECURRENT MAJOR DEPRESSIVE DISORDER (HCC): ICD-10-CM

## 2023-10-05 PROCEDURE — 3017F COLORECTAL CA SCREEN DOC REV: CPT | Performed by: FAMILY MEDICINE

## 2023-10-05 PROCEDURE — 3023F SPIROM DOC REV: CPT | Performed by: FAMILY MEDICINE

## 2023-10-05 PROCEDURE — 3078F DIAST BP <80 MM HG: CPT | Performed by: FAMILY MEDICINE

## 2023-10-05 PROCEDURE — 3074F SYST BP LT 130 MM HG: CPT | Performed by: FAMILY MEDICINE

## 2023-10-05 PROCEDURE — 4004F PT TOBACCO SCREEN RCVD TLK: CPT | Performed by: FAMILY MEDICINE

## 2023-10-05 PROCEDURE — G8427 DOCREV CUR MEDS BY ELIG CLIN: HCPCS | Performed by: FAMILY MEDICINE

## 2023-10-05 PROCEDURE — 99214 OFFICE O/P EST MOD 30 MIN: CPT | Performed by: FAMILY MEDICINE

## 2023-10-05 PROCEDURE — G8484 FLU IMMUNIZE NO ADMIN: HCPCS | Performed by: FAMILY MEDICINE

## 2023-10-05 PROCEDURE — G8417 CALC BMI ABV UP PARAM F/U: HCPCS | Performed by: FAMILY MEDICINE

## 2023-10-05 PROCEDURE — 36415 COLL VENOUS BLD VENIPUNCTURE: CPT | Performed by: FAMILY MEDICINE

## 2023-10-05 RX ORDER — ESCITALOPRAM OXALATE 20 MG/1
20 TABLET ORAL DAILY
Qty: 90 TABLET | Refills: 1 | Status: SHIPPED | OUTPATIENT
Start: 2023-10-05

## 2023-10-05 RX ORDER — LEVOTHYROXINE SODIUM 88 UG/1
TABLET ORAL
Qty: 30 TABLET | Refills: 5 | Status: SHIPPED | OUTPATIENT
Start: 2023-10-05

## 2023-10-05 RX ORDER — FLUTICASONE PROPIONATE 220 UG/1
AEROSOL, METERED RESPIRATORY (INHALATION)
Qty: 12 G | Refills: 5 | Status: SHIPPED | OUTPATIENT
Start: 2023-10-05

## 2023-10-05 RX ORDER — OMEGA-3S/DHA/EPA/FISH OIL/D3 300MG-1000
400 CAPSULE ORAL DAILY
Qty: 30 TABLET | Refills: 5 | Status: SHIPPED | OUTPATIENT
Start: 2023-10-05

## 2023-10-05 RX ORDER — AMITRIPTYLINE HYDROCHLORIDE 100 MG/1
100 TABLET ORAL EVERY EVENING
Qty: 30 TABLET | Refills: 5 | Status: SHIPPED | OUTPATIENT
Start: 2023-10-05

## 2023-10-05 RX ORDER — FLUTICASONE PROPIONATE 50 MCG
SPRAY, SUSPENSION (ML) NASAL
Qty: 16 G | Refills: 5 | Status: SHIPPED | OUTPATIENT
Start: 2023-10-05

## 2023-10-05 ASSESSMENT — PATIENT HEALTH QUESTIONNAIRE - PHQ9
7. TROUBLE CONCENTRATING ON THINGS, SUCH AS READING THE NEWSPAPER OR WATCHING TELEVISION: 0
SUM OF ALL RESPONSES TO PHQ QUESTIONS 1-9: 0
SUM OF ALL RESPONSES TO PHQ QUESTIONS 1-9: 0
SUM OF ALL RESPONSES TO PHQ9 QUESTIONS 1 & 2: 0
6. FEELING BAD ABOUT YOURSELF - OR THAT YOU ARE A FAILURE OR HAVE LET YOURSELF OR YOUR FAMILY DOWN: 0
5. POOR APPETITE OR OVEREATING: 0
8. MOVING OR SPEAKING SO SLOWLY THAT OTHER PEOPLE COULD HAVE NOTICED. OR THE OPPOSITE, BEING SO FIGETY OR RESTLESS THAT YOU HAVE BEEN MOVING AROUND A LOT MORE THAN USUAL: 0
SUM OF ALL RESPONSES TO PHQ QUESTIONS 1-9: 0
3. TROUBLE FALLING OR STAYING ASLEEP: 0
9. THOUGHTS THAT YOU WOULD BE BETTER OFF DEAD, OR OF HURTING YOURSELF: 0
2. FEELING DOWN, DEPRESSED OR HOPELESS: 0
4. FEELING TIRED OR HAVING LITTLE ENERGY: 0
1. LITTLE INTEREST OR PLEASURE IN DOING THINGS: 0
SUM OF ALL RESPONSES TO PHQ QUESTIONS 1-9: 0
10. IF YOU CHECKED OFF ANY PROBLEMS, HOW DIFFICULT HAVE THESE PROBLEMS MADE IT FOR YOU TO DO YOUR WORK, TAKE CARE OF THINGS AT HOME, OR GET ALONG WITH OTHER PEOPLE: 0

## 2023-10-06 LAB
ALBUMIN SERPL-MCNC: 4.5 G/DL (ref 3.4–5)
ALBUMIN/GLOB SERPL: 1.9 {RATIO} (ref 1.1–2.2)
ALP SERPL-CCNC: 100 U/L (ref 40–129)
ALT SERPL-CCNC: 12 U/L (ref 10–40)
ANION GAP SERPL CALCULATED.3IONS-SCNC: 14 MMOL/L (ref 3–16)
AST SERPL-CCNC: 16 U/L (ref 15–37)
BILIRUB SERPL-MCNC: 0.3 MG/DL (ref 0–1)
BUN SERPL-MCNC: 9 MG/DL (ref 7–20)
CALCIUM SERPL-MCNC: 9.2 MG/DL (ref 8.3–10.6)
CHLORIDE SERPL-SCNC: 96 MMOL/L (ref 99–110)
CHOLEST SERPL-MCNC: 109 MG/DL (ref 0–199)
CO2 SERPL-SCNC: 25 MMOL/L (ref 21–32)
CREAT SERPL-MCNC: 0.9 MG/DL (ref 0.6–1.2)
GFR SERPLBLD CREATININE-BSD FMLA CKD-EPI: >60 ML/MIN/{1.73_M2}
GLUCOSE SERPL-MCNC: 82 MG/DL (ref 70–99)
HDLC SERPL-MCNC: 32 MG/DL (ref 40–60)
LDLC SERPL CALC-MCNC: 40 MG/DL
POTASSIUM SERPL-SCNC: 4.4 MMOL/L (ref 3.5–5.1)
PROT SERPL-MCNC: 6.9 G/DL (ref 6.4–8.2)
SODIUM SERPL-SCNC: 135 MMOL/L (ref 136–145)
TRIGL SERPL-MCNC: 186 MG/DL (ref 0–150)
VLDLC SERPL CALC-MCNC: 37 MG/DL

## 2023-10-09 ENCOUNTER — TELEPHONE (OUTPATIENT)
Dept: CARDIOLOGY CLINIC | Age: 63
End: 2023-10-09

## 2023-10-09 NOTE — TELEPHONE ENCOUNTER
Medication: Tawana Graham- Patient felt very ill. Symptoms: bad headaches, muscle aches and pains, diarrhea,  Not taking it anymore. IS there anything else she can take? It took her over a week to have some of the symptoms leave her.

## 2023-10-09 NOTE — TELEPHONE ENCOUNTER
Patient asked to try 1/2 tab BID per Dr Jeyson Garcia.  Patient agreed and will call if symptoms come back

## 2023-10-10 DIAGNOSIS — F01.B0 MODERATE VASCULAR DEMENTIA WITHOUT BEHAVIORAL DISTURBANCE, PSYCHOTIC DISTURBANCE, MOOD DISTURBANCE, OR ANXIETY (HCC): ICD-10-CM

## 2023-10-10 RX ORDER — MEMANTINE HYDROCHLORIDE AND DONEPEZIL HYDROCHLORIDE 28; 10 MG/1; MG/1
1 CAPSULE ORAL DAILY
Qty: 30 CAPSULE | Refills: 5 | OUTPATIENT
Start: 2023-10-10

## 2023-10-23 ENCOUNTER — TELEPHONE (OUTPATIENT)
Dept: FAMILY MEDICINE CLINIC | Age: 63
End: 2023-10-23

## 2023-10-23 DIAGNOSIS — I73.00 RAYNAUD'S DISEASE WITHOUT GANGRENE: Primary | ICD-10-CM

## 2023-10-23 RX ORDER — AMLODIPINE BESYLATE 5 MG/1
5 TABLET ORAL DAILY
Qty: 90 TABLET | Refills: 1 | Status: SHIPPED | OUTPATIENT
Start: 2023-10-23

## 2023-10-23 NOTE — TELEPHONE ENCOUNTER
Patient wanted to know if you would still be willing to prescribe something for the Raynaud. She stated her hands turn purple. She uses [facility].

## 2023-10-24 ENCOUNTER — HOSPITAL ENCOUNTER (OUTPATIENT)
Age: 63
Discharge: HOME | End: 2023-10-24
Payer: MEDICAID

## 2023-10-24 ENCOUNTER — NURSE ONLY (OUTPATIENT)
Dept: FAMILY MEDICINE CLINIC | Age: 63
End: 2023-10-24
Payer: MEDICAID

## 2023-10-24 DIAGNOSIS — I21.4 NSTEMI (NON-ST ELEVATED MYOCARDIAL INFARCTION) (HCC): ICD-10-CM

## 2023-10-24 DIAGNOSIS — I47.10 SVT (SUPRAVENTRICULAR TACHYCARDIA): ICD-10-CM

## 2023-10-24 DIAGNOSIS — E78.5 DYSLIPIDEMIA: ICD-10-CM

## 2023-10-24 DIAGNOSIS — I83.893 VARICOSE VEINS OF BOTH LEGS WITH EDEMA: ICD-10-CM

## 2023-10-24 DIAGNOSIS — I25.110 CORONARY ARTERY DISEASE INVOLVING NATIVE CORONARY ARTERY OF NATIVE HEART WITH UNSTABLE ANGINA PECTORIS (HCC): ICD-10-CM

## 2023-10-24 DIAGNOSIS — I10 HYPERTENSION, ESSENTIAL, BENIGN: ICD-10-CM

## 2023-10-24 DIAGNOSIS — F17.200 TOBACCO USE DISORDER: ICD-10-CM

## 2023-10-24 DIAGNOSIS — Z23 IMMUNIZATION DUE: Primary | ICD-10-CM

## 2023-10-24 DIAGNOSIS — I50.22 CHRONIC SYSTOLIC CONGESTIVE HEART FAILURE (HCC): ICD-10-CM

## 2023-10-24 DIAGNOSIS — Z98.890 S/P ABLATION OF ACCESSORY BYPASS TRACT: ICD-10-CM

## 2023-10-24 LAB
ANION GAP SERPL CALCULATED.3IONS-SCNC: 13 MMOL/L (ref 4–16)
BUN SERPL-MCNC: 15 MG/DL (ref 6–23)
CALCIUM SERPL-MCNC: 10 MG/DL (ref 8.3–10.6)
CHLORIDE BLD-SCNC: 102 MMOL/L (ref 99–110)
CO2: 22 MMOL/L (ref 21–32)
CREAT SERPL-MCNC: 1.1 MG/DL (ref 0.6–1.1)
GFR SERPL CREATININE-BSD FRML MDRD: 57 ML/MIN/1.73M2
GLUCOSE SERPL-MCNC: 90 MG/DL (ref 70–99)
POTASSIUM SERPL-SCNC: 4.6 MMOL/L (ref 3.5–5.1)
SODIUM BLD-SCNC: 137 MMOL/L (ref 135–145)

## 2023-10-24 PROCEDURE — 80048 BASIC METABOLIC PNL TOTAL CA: CPT

## 2023-10-24 PROCEDURE — 90471 IMMUNIZATION ADMIN: CPT | Performed by: FAMILY MEDICINE

## 2023-10-24 PROCEDURE — 36415 COLL VENOUS BLD VENIPUNCTURE: CPT

## 2023-10-24 PROCEDURE — 90674 CCIIV4 VAC NO PRSV 0.5 ML IM: CPT | Performed by: FAMILY MEDICINE

## 2023-11-01 ENCOUNTER — OFFICE VISIT (OUTPATIENT)
Dept: CARDIOLOGY CLINIC | Age: 63
End: 2023-11-01
Payer: MEDICAID

## 2023-11-01 VITALS
HEIGHT: 64 IN | HEART RATE: 76 BPM | BODY MASS INDEX: 30.56 KG/M2 | SYSTOLIC BLOOD PRESSURE: 118 MMHG | WEIGHT: 179 LBS | DIASTOLIC BLOOD PRESSURE: 70 MMHG

## 2023-11-01 DIAGNOSIS — I83.893 VARICOSE VEINS OF BOTH LEGS WITH EDEMA: ICD-10-CM

## 2023-11-01 DIAGNOSIS — E66.09 NON MORBID OBESITY DUE TO EXCESS CALORIES: ICD-10-CM

## 2023-11-01 DIAGNOSIS — I51.3 LV (LEFT VENTRICULAR) MURAL THROMBUS: ICD-10-CM

## 2023-11-01 DIAGNOSIS — I10 HYPERTENSION, ESSENTIAL, BENIGN: ICD-10-CM

## 2023-11-01 DIAGNOSIS — I25.110 CORONARY ARTERY DISEASE INVOLVING NATIVE CORONARY ARTERY OF NATIVE HEART WITH UNSTABLE ANGINA PECTORIS (HCC): Primary | ICD-10-CM

## 2023-11-01 DIAGNOSIS — I47.10 SVT (SUPRAVENTRICULAR TACHYCARDIA): ICD-10-CM

## 2023-11-01 DIAGNOSIS — Z98.890 S/P ABLATION OF ACCESSORY BYPASS TRACT: ICD-10-CM

## 2023-11-01 DIAGNOSIS — F17.200 TOBACCO USE DISORDER: ICD-10-CM

## 2023-11-01 DIAGNOSIS — E78.5 DYSLIPIDEMIA: ICD-10-CM

## 2023-11-01 DIAGNOSIS — I50.22 CHRONIC SYSTOLIC CONGESTIVE HEART FAILURE (HCC): ICD-10-CM

## 2023-11-01 DIAGNOSIS — I21.4 NSTEMI (NON-ST ELEVATED MYOCARDIAL INFARCTION) (HCC): ICD-10-CM

## 2023-11-01 PROCEDURE — 4004F PT TOBACCO SCREEN RCVD TLK: CPT | Performed by: INTERNAL MEDICINE

## 2023-11-01 PROCEDURE — 99214 OFFICE O/P EST MOD 30 MIN: CPT | Performed by: INTERNAL MEDICINE

## 2023-11-01 PROCEDURE — G8482 FLU IMMUNIZE ORDER/ADMIN: HCPCS | Performed by: INTERNAL MEDICINE

## 2023-11-01 PROCEDURE — 3017F COLORECTAL CA SCREEN DOC REV: CPT | Performed by: INTERNAL MEDICINE

## 2023-11-01 PROCEDURE — G8417 CALC BMI ABV UP PARAM F/U: HCPCS | Performed by: INTERNAL MEDICINE

## 2023-11-01 PROCEDURE — 3074F SYST BP LT 130 MM HG: CPT | Performed by: INTERNAL MEDICINE

## 2023-11-01 PROCEDURE — G8427 DOCREV CUR MEDS BY ELIG CLIN: HCPCS | Performed by: INTERNAL MEDICINE

## 2023-11-01 PROCEDURE — 3078F DIAST BP <80 MM HG: CPT | Performed by: INTERNAL MEDICINE

## 2023-11-01 RX ORDER — NICOTINE 21 MG/24HR
1 PATCH, TRANSDERMAL 24 HOURS TRANSDERMAL DAILY
Qty: 42 PATCH | Refills: 0 | Status: SHIPPED | OUTPATIENT
Start: 2023-11-01 | End: 2023-12-13

## 2023-11-01 RX ORDER — SPIRONOLACTONE 25 MG/1
25 TABLET ORAL DAILY
Qty: 90 TABLET | Refills: 1 | Status: SHIPPED | OUTPATIENT
Start: 2023-11-01

## 2023-11-01 NOTE — PATIENT INSTRUCTIONS
CORONARY ARTERY DISEASE:Yes   clinically stable. Patient is on optimal medical regimen ( see medication list above )  - Patient is currently  asymptomatic from CAD. - changes in  treatment:   no, On asa & Coreg           - Testing ordered:  no  [place] classification: 1  CARDIOLITE  4/5/2021    Large apical MI, involving adelso apical & Infero apical portions. No    reversible ischemia    Apical Hypokinesis with preserved LV function LVEF is 53 %      Cardiolite 6/6/2023   Abnormal study. Large are of apical Infarction, involving adelso apical & Infero apical   portions. There is some reversible ischemia of the Septum but not   significant. LV reveals apical Dyskinesia with mildly reduced LV function. LVEF is 45 %. HTN: Well controlled, on Losartan & Coreg      CARDIOMYOPATHY:  known   CONGESTIVE HEART FAILURE:  KNOWN HISTORY. Patient has known systolic (HFrEF) CHF, NYHA Class 2 to 3      VHD: No significant VHD noted  ECHO 9/6/2023   Definity utilized to visualize the left ventricle. Left ventricular function is moderately abnormal, EF is estimated at   35-40%. Left ventricle size is normal.   Apical Dyskinesa of the apical septum,apical cap, apical and apical lateral   wall. Mild left ventricular hypertrophy. Grade I diastolic dysfunction. Aortic valve leaflets are somewhat thickened. Mitral annular calcification is present. Mild mitral and mild to moderate aortic regurgitation is present. RVSP is 17 mmHg. No evidence of pericardial effusion. DYSLIPIDEMIA: Patient's profile is at / near Goal.yes,                                 HDL is low                                Tolerating current medical regimen wellyes,  takes Lipitor                                                            See most recent Lab values in Labs section above. CVI: Patient has symptomatic, clinically C4 venous disease. Patient to continue to wear compression stockings.   10/2021    No evidence

## 2023-11-01 NOTE — PROGRESS NOTES
values in Labs section above. CVI: Patient has symptomatic, clinically C4 venous disease. Patient to continue to wear compression stockings. 10/2021    No evidence of DVT or SVT in the bilateral common femoral vein, femoral    vein, popliteal vein, greater saphenous vein or small saphenous vein. Significant reflux noted of the Right CFV (1.4s) and GSV SFJ (0.6s). Significant reflux noted in the Left GSV Mid Thigh (0.5s).      5/2023 US shows:  B/L CFV are normal, no evidence of DVT. B/L GSVs non compressible from SFJ to knee with no evidence of flow. ARRHYTHMIAS: known H/O SVT S/P Ablation    TESTS ORDERED:[facility]     PREVIOUSLY ORDERED TESTS REVIEWED & DISCUSSED WITH THE PATIENT:     I personally reviewed & interpreted, all previously ordered tests as copied above. Latest Labs are pulled in to the note with dates. Labs, specially in Reference to Lipid profile, Cardiac testing in the form of Echo ( dated: ), stress tests ( dated: ) & other relevant cardiac testing reviewed with patient & recommendations made based on assessment of the results. Discussed role of Cardiac risk factors & effects + treatment of co morbidities with patient & advised accordingly. MEDICATIONS: List of medications patient is currently taking is reviewed in detail with the patient. Discussed any side effects or problems taking the medication. Recommend Continue present management & medications as listed. + Add Aldactone 25 mg daily & Jardiance 10 mg daily to the regimen & F/U Labs. AFFIRMATION: I  reviewed patient's history, previous & current medical problems & all Labs + testing. This includes chart prep even prior to the vosit. Various goals are discussed and multiple questions answered. Relevant concelling performed. Office follow up in 2 months.

## 2023-11-17 LAB
BASOPHILS # BLD: 0.7 %
BASOPHILS ABSOLUTE: 0 K/UL (ref 0–0.1)
EOSINOPHILS ABSOLUTE: 0.2 K/UL (ref 0–0.4)
EOSINOPHILS RELATIVE PERCENT: 3.8 %
HEMOGLOBIN URINE, QUAL: 12.8 G/DL (ref 12.1–15.8)
LYMPHOCYTES # BLD: 38.5 %
LYMPHOCYTES ABSOLUTE: 2.1 K/UL (ref 0.8–3.6)
MCH RBC QN AUTO: 30 PG (ref 28.4–33.4)
MCHC RBC AUTO-ENTMCNC: 33.8 G/DL (ref 31.1–37)
MCV RBC AUTO: 89 FL (ref 85–99)
MONOCYTES # BLD: 7.2 %
MONOCYTES ABSOLUTE: 0.4 K/UL (ref 0.3–0.9)
NEUTROPHILS ABSOLUTE: 2.7 K/UL (ref 2–7.3)
NEUTROPHILS/100 LEUKOCYTES: 49.8 %
PDW BLD-RTO: 16.6 % (ref 11.7–15.2)
PLATELET COUNT MANUAL: 158 K/UL (ref 154–393)
RBC # BLD: 4.25 M/UL (ref 3.86–5.17)
SR HEMATOCRIT: 37.8 % (ref 35.8–46.5)
WBC BLOOD, MANUAL: 5.4 K/UL (ref 4–10.5)

## 2023-11-28 ENCOUNTER — OFFICE VISIT (OUTPATIENT)
Dept: OBGYN | Age: 63
End: 2023-11-28
Payer: MEDICAID

## 2023-11-28 VITALS
DIASTOLIC BLOOD PRESSURE: 72 MMHG | HEART RATE: 75 BPM | WEIGHT: 176 LBS | HEIGHT: 64 IN | BODY MASS INDEX: 30.05 KG/M2 | SYSTOLIC BLOOD PRESSURE: 108 MMHG

## 2023-11-28 DIAGNOSIS — D39.8: ICD-10-CM

## 2023-11-28 DIAGNOSIS — E66.9 OBESITY (BMI 30.0-34.9): ICD-10-CM

## 2023-11-28 DIAGNOSIS — N90.89 VULVAR LESION: Primary | ICD-10-CM

## 2023-11-28 PROCEDURE — 4004F PT TOBACCO SCREEN RCVD TLK: CPT | Performed by: OBSTETRICS & GYNECOLOGY

## 2023-11-28 PROCEDURE — G8482 FLU IMMUNIZE ORDER/ADMIN: HCPCS | Performed by: OBSTETRICS & GYNECOLOGY

## 2023-11-28 PROCEDURE — 99213 OFFICE O/P EST LOW 20 MIN: CPT | Performed by: OBSTETRICS & GYNECOLOGY

## 2023-11-28 PROCEDURE — G8417 CALC BMI ABV UP PARAM F/U: HCPCS | Performed by: OBSTETRICS & GYNECOLOGY

## 2023-11-28 PROCEDURE — 3078F DIAST BP <80 MM HG: CPT | Performed by: OBSTETRICS & GYNECOLOGY

## 2023-11-28 PROCEDURE — G8427 DOCREV CUR MEDS BY ELIG CLIN: HCPCS | Performed by: OBSTETRICS & GYNECOLOGY

## 2023-11-28 PROCEDURE — 3074F SYST BP LT 130 MM HG: CPT | Performed by: OBSTETRICS & GYNECOLOGY

## 2023-11-28 PROCEDURE — 3017F COLORECTAL CA SCREEN DOC REV: CPT | Performed by: OBSTETRICS & GYNECOLOGY

## 2023-11-28 RX ORDER — METFORMIN HYDROCHLORIDE 500 MG/1
500 TABLET, EXTENDED RELEASE ORAL
Qty: 90 TABLET | Refills: 3 | Status: SHIPPED | OUTPATIENT
Start: 2023-11-28 | End: 2024-11-22

## 2023-11-28 NOTE — PROGRESS NOTES
Per [facility] Medicaid online, no prior auth required for [id number]  Ref # [id number]  Ready to schedule

## 2023-11-28 NOTE — PROGRESS NOTES
11/28/23    Patricia Palomo  1960    Chief Complaint   Patient presents with    Other     Pt here for 4 month f/u on Genital condyloma, currently on alogra cream. Pt requesting refill on  alogra cream and metformin. No c/o today. Patricia Palomo is a 66 y.o. female who presents today for evaluation of condyloma    Past Medical History:   Diagnosis Date    Abnormal nuclear stress test     ACS (acute coronary syndrome) ([address])     Arterial ischemic stroke, MCA (middle cerebral artery), left, acute (HCC)     Benign mole     CAD (coronary artery disease)     CHF (congestive heart failure) ([facility])     Condyloma     COPD (chronic obstructive pulmonary disease) ([facility])     CVA (cerebral vascular accident) ([address]) 04/06/2014    H/O cardiovascular stress test 05/19/2016    treadmill    History of left heart catheterization 04/17/2016    Severe 2 vessel disease, but 3 vessel disease. 2.5x30 Resolute Stent placed to the LAD. Successful angio-seal deployment w/ excellent results. History of stress test 03/05/2021    Large apical MI, involving adelso apical & Infero apical portions. No reversible ischemia  Apical Hypokinesis with preserved LV function LVEF is 53 %    Hx of cardiovascular stress test 06/22/2015    lexiscan-scar,EF47%    Hx of echocardiogram 04/08/2014    VALDEMAR: EF 40-45%. Right and left atrium/ventricles are normal. Mitral/Tricuspid valves normal. Mild aortic va;lve stenosis. Hx of echocardiogram 04/18/2016    EF 35-40%. Borderline LA enlargement. 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 Mild MR/TR with trace aortic insufficiency. Mild pulmonary HTN. Hx of echocardiogram 03/05/2021    at 40-45%,E/A reversal; indeterminate diastolic function. Hypokinesis of apical septum,apical cap,apical lateral wall. Mild mitral and tricuspid regurgitation is present.     Hyperlipemia     Hypertension, essential, benign     Nicotine dependence     quit 2014

## 2023-12-07 DIAGNOSIS — F01.B0 MODERATE VASCULAR DEMENTIA WITHOUT BEHAVIORAL DISTURBANCE, PSYCHOTIC DISTURBANCE, MOOD DISTURBANCE, OR ANXIETY (HCC): ICD-10-CM

## 2023-12-07 DIAGNOSIS — J44.1 CHRONIC OBSTRUCTIVE PULMONARY DISEASE WITH ACUTE EXACERBATION (HCC): ICD-10-CM

## 2023-12-07 RX ORDER — ALBUTEROL SULFATE 2.5 MG/3ML
SOLUTION RESPIRATORY (INHALATION)
Qty: 375 ML | Refills: 5 | Status: SHIPPED | OUTPATIENT
Start: 2023-12-07

## 2024-01-02 LAB
BUN BLDV-MCNC: 12 MG/DL
CALCIUM SERPL-MCNC: 10.2 MG/DL
CHLORIDE BLD-SCNC: 102 MMOL/L
CO2: 32 MMOL/L
CREAT SERPL-MCNC: 1.2 MG/DL
EGFR: NORMAL
GLUCOSE BLD-MCNC: 88 MG/DL
POTASSIUM SERPL-SCNC: 4.1 MMOL/L
SODIUM BLD-SCNC: 142 MMOL/L

## 2024-01-03 ENCOUNTER — OFFICE VISIT (OUTPATIENT)
Dept: CARDIOLOGY CLINIC | Age: 64
End: 2024-01-03
Payer: MEDICAID

## 2024-01-03 VITALS
SYSTOLIC BLOOD PRESSURE: 90 MMHG | HEIGHT: 64 IN | DIASTOLIC BLOOD PRESSURE: 60 MMHG | WEIGHT: 180 LBS | BODY MASS INDEX: 30.73 KG/M2 | HEART RATE: 72 BPM

## 2024-01-03 DIAGNOSIS — I50.22 CHRONIC SYSTOLIC CONGESTIVE HEART FAILURE (HCC): ICD-10-CM

## 2024-01-03 DIAGNOSIS — Z98.890 S/P ABLATION OF ACCESSORY BYPASS TRACT: ICD-10-CM

## 2024-01-03 DIAGNOSIS — F17.200 TOBACCO USE DISORDER: ICD-10-CM

## 2024-01-03 DIAGNOSIS — E78.5 DYSLIPIDEMIA: ICD-10-CM

## 2024-01-03 DIAGNOSIS — I25.110 CORONARY ARTERY DISEASE INVOLVING NATIVE CORONARY ARTERY OF NATIVE HEART WITH UNSTABLE ANGINA PECTORIS (HCC): Primary | ICD-10-CM

## 2024-01-03 DIAGNOSIS — I47.10 SVT (SUPRAVENTRICULAR TACHYCARDIA): ICD-10-CM

## 2024-01-03 DIAGNOSIS — I83.893 VARICOSE VEINS OF BOTH LEGS WITH EDEMA: ICD-10-CM

## 2024-01-03 PROCEDURE — 4004F PT TOBACCO SCREEN RCVD TLK: CPT | Performed by: INTERNAL MEDICINE

## 2024-01-03 PROCEDURE — 99213 OFFICE O/P EST LOW 20 MIN: CPT | Performed by: INTERNAL MEDICINE

## 2024-01-03 PROCEDURE — 3017F COLORECTAL CA SCREEN DOC REV: CPT | Performed by: INTERNAL MEDICINE

## 2024-01-03 PROCEDURE — 3074F SYST BP LT 130 MM HG: CPT | Performed by: INTERNAL MEDICINE

## 2024-01-03 PROCEDURE — G8427 DOCREV CUR MEDS BY ELIG CLIN: HCPCS | Performed by: INTERNAL MEDICINE

## 2024-01-03 PROCEDURE — G8417 CALC BMI ABV UP PARAM F/U: HCPCS | Performed by: INTERNAL MEDICINE

## 2024-01-03 PROCEDURE — G8482 FLU IMMUNIZE ORDER/ADMIN: HCPCS | Performed by: INTERNAL MEDICINE

## 2024-01-03 PROCEDURE — 3078F DIAST BP <80 MM HG: CPT | Performed by: INTERNAL MEDICINE

## 2024-01-03 RX ORDER — NICOTINE 21 MG/24HR
1 PATCH, TRANSDERMAL 24 HOURS TRANSDERMAL DAILY
Qty: 28 PATCH | Refills: 0 | Status: SHIPPED | OUTPATIENT
Start: 2024-01-03

## 2024-01-03 NOTE — PATIENT INSTRUCTIONS
CORONARY ARTERY DISEASE:Yes   clinically stable. Patient is on optimal medical regimen ( see medication list above )  - Patient is currently  asymptomatic from CAD.            - changes in  treatment:   no, On asa & Coreg           - Testing ordered:  no  [place] classification: 1  CARDIOLITE  4/5/2021    Large apical MI, involving adelso apical & Infero apical portions. No    reversible ischemia    Apical Hypokinesis with preserved LV function LVEF is 53 %      Cardiolite 6/6/2023   Abnormal study.   Large are of apical Infarction, involving adelso apical & Infero apical   portions. There is some reversible ischemia of the Septum but not   significant.   LV reveals apical Dyskinesia with mildly reduced LV function. LVEF is 45 %.     HTN: Well controlled, on Losartan & Coreg      CARDIOMYOPATHY:  known   CONGESTIVE HEART FAILURE:  KNOWN HISTORY.   Patient has known systolic (HFrEF) CHF, NYHA Class 2 to 3.  Patient could not tolerate Aldactone due to frequent urination.      VHD: No significant VHD noted  ECHO 9/6/2023   Definity utilized to visualize the left ventricle.   Left ventricular function is moderately abnormal, EF is estimated at   35-40%.   Left ventricle size is normal.   Apical Dyskinesa of the apical septum,apical cap, apical and apical lateral   wall.   Mild left ventricular hypertrophy.   Grade I diastolic dysfunction.   Aortic valve leaflets are somewhat thickened.   Mitral annular calcification is present.   Mild mitral and mild to moderate aortic regurgitation is present.   RVSP is 17 mmHg.   No evidence of pericardial effusion.     DYSLIPIDEMIA: Patient's profile is at / near Goal.yes,                                 HDL is low                                Tolerating current medical regimen wellyes,  takes Lipitor                                                            See most recent Lab values in Labs section above.     CVI: Patient has symptomatic, clinically C4 venous disease. Patient

## 2024-01-08 DIAGNOSIS — N32.81 OAB (OVERACTIVE BLADDER): ICD-10-CM

## 2024-01-08 DIAGNOSIS — J44.9 CHRONIC OBSTRUCTIVE PULMONARY DISEASE, UNSPECIFIED COPD TYPE (HCC): ICD-10-CM

## 2024-01-08 DIAGNOSIS — E03.4 HYPOTHYROIDISM DUE TO ACQUIRED ATROPHY OF THYROID: ICD-10-CM

## 2024-01-08 RX ORDER — DONEPEZIL HYDROCHLORIDE 10 MG/1
10 TABLET, FILM COATED ORAL NIGHTLY
Qty: 30 TABLET | Refills: 5 | Status: SHIPPED | OUTPATIENT
Start: 2024-01-08

## 2024-01-08 RX ORDER — FLUTICASONE PROPIONATE 220 UG/1
AEROSOL, METERED RESPIRATORY (INHALATION)
Qty: 36 EACH | Refills: 1 | OUTPATIENT
Start: 2024-01-08

## 2024-01-08 RX ORDER — LEVOTHYROXINE SODIUM 88 UG/1
TABLET ORAL
Qty: 90 TABLET | Refills: 1 | OUTPATIENT
Start: 2024-01-08

## 2024-01-08 RX ORDER — OMEGA-3S/DHA/EPA/FISH OIL/D3 300MG-1000
400 CAPSULE ORAL DAILY
Qty: 90 TABLET | Refills: 1 | OUTPATIENT
Start: 2024-01-08

## 2024-01-08 RX ORDER — MEMANTINE HYDROCHLORIDE 10 MG/1
10 TABLET ORAL 2 TIMES DAILY
Qty: 60 TABLET | Refills: 0 | Status: SHIPPED | OUTPATIENT
Start: 2024-01-08

## 2024-01-08 RX ORDER — MIRABEGRON 25 MG/1
25 TABLET, FILM COATED, EXTENDED RELEASE ORAL DAILY
Qty: 90 TABLET | Refills: 1 | OUTPATIENT
Start: 2024-01-08

## 2024-01-08 RX ORDER — NICOTINE 21 MG/24HR
PATCH, TRANSDERMAL 24 HOURS TRANSDERMAL
Qty: 28 PATCH | Refills: 0 | OUTPATIENT
Start: 2024-01-08

## 2024-01-24 ENCOUNTER — TELEPHONE (OUTPATIENT)
Dept: CARDIOLOGY CLINIC | Age: 64
End: 2024-01-24

## 2024-01-24 NOTE — TELEPHONE ENCOUNTER
Spoke with patient , currently 109/89. Patient is fatigued, dizziness on rising. Per Dr Crowley, reduce Coreg to 6.25mg BID. Patient advised

## 2024-01-24 NOTE — TELEPHONE ENCOUNTER
Patient said she has had no energy.  Patient's blood pressure 79/57 today.  Please advise. Patient said since seeing dr blood pressure has not come up.

## 2024-02-05 ENCOUNTER — TELEPHONE (OUTPATIENT)
Dept: FAMILY MEDICINE CLINIC | Age: 64
End: 2024-02-05

## 2024-02-05 NOTE — TELEPHONE ENCOUNTER
Patients daughter called in stating that the patient just got released from the hospital due to a stroke and has been summoned for jury duty. Patients daughter would like to know if  can write a letter stating that the patient is unable to attend jury duty due to having a stroke. Please advise.     Patient is scheduled for hospital follow up on 2/6/2024.

## 2024-02-06 ENCOUNTER — OFFICE VISIT (OUTPATIENT)
Dept: FAMILY MEDICINE CLINIC | Age: 64
End: 2024-02-06
Payer: MEDICAID

## 2024-02-06 VITALS
TEMPERATURE: 97.3 F | DIASTOLIC BLOOD PRESSURE: 72 MMHG | BODY MASS INDEX: 28.97 KG/M2 | HEART RATE: 74 BPM | WEIGHT: 168.8 LBS | OXYGEN SATURATION: 97 % | SYSTOLIC BLOOD PRESSURE: 124 MMHG

## 2024-02-06 DIAGNOSIS — I10 HYPERTENSION, ESSENTIAL, BENIGN: ICD-10-CM

## 2024-02-06 DIAGNOSIS — F17.200 TOBACCO USE DISORDER: ICD-10-CM

## 2024-02-06 DIAGNOSIS — I63.532 ACUTE ISCHEMIC LEFT PCA STROKE (HCC): ICD-10-CM

## 2024-02-06 DIAGNOSIS — Z09 HOSPITAL DISCHARGE FOLLOW-UP: Primary | ICD-10-CM

## 2024-02-06 DIAGNOSIS — E78.5 DYSLIPIDEMIA: ICD-10-CM

## 2024-02-06 DIAGNOSIS — I25.110 CORONARY ARTERY DISEASE INVOLVING NATIVE CORONARY ARTERY OF NATIVE HEART WITH UNSTABLE ANGINA PECTORIS (HCC): ICD-10-CM

## 2024-02-06 PROCEDURE — 99215 OFFICE O/P EST HI 40 MIN: CPT | Performed by: FAMILY MEDICINE

## 2024-02-06 PROCEDURE — 1111F DSCHRG MED/CURRENT MED MERGE: CPT | Performed by: FAMILY MEDICINE

## 2024-02-06 SDOH — ECONOMIC STABILITY: FOOD INSECURITY: WITHIN THE PAST 12 MONTHS, YOU WORRIED THAT YOUR FOOD WOULD RUN OUT BEFORE YOU GOT MONEY TO BUY MORE.: NEVER TRUE

## 2024-02-06 SDOH — ECONOMIC STABILITY: FOOD INSECURITY: WITHIN THE PAST 12 MONTHS, THE FOOD YOU BOUGHT JUST DIDN'T LAST AND YOU DIDN'T HAVE MONEY TO GET MORE.: NEVER TRUE

## 2024-02-06 SDOH — ECONOMIC STABILITY: INCOME INSECURITY: HOW HARD IS IT FOR YOU TO PAY FOR THE VERY BASICS LIKE FOOD, HOUSING, MEDICAL CARE, AND HEATING?: NOT HARD AT ALL

## 2024-02-06 ASSESSMENT — PATIENT HEALTH QUESTIONNAIRE - PHQ9
10. IF YOU CHECKED OFF ANY PROBLEMS, HOW DIFFICULT HAVE THESE PROBLEMS MADE IT FOR YOU TO DO YOUR WORK, TAKE CARE OF THINGS AT HOME, OR GET ALONG WITH OTHER PEOPLE: 0
5. POOR APPETITE OR OVEREATING: 0
SUM OF ALL RESPONSES TO PHQ QUESTIONS 1-9: 2
4. FEELING TIRED OR HAVING LITTLE ENERGY: 1
1. LITTLE INTEREST OR PLEASURE IN DOING THINGS: 0
2. FEELING DOWN, DEPRESSED OR HOPELESS: 1
8. MOVING OR SPEAKING SO SLOWLY THAT OTHER PEOPLE COULD HAVE NOTICED. OR THE OPPOSITE, BEING SO FIGETY OR RESTLESS THAT YOU HAVE BEEN MOVING AROUND A LOT MORE THAN USUAL: 0
SUM OF ALL RESPONSES TO PHQ QUESTIONS 1-9: 2
3. TROUBLE FALLING OR STAYING ASLEEP: 0
SUM OF ALL RESPONSES TO PHQ QUESTIONS 1-9: 2
SUM OF ALL RESPONSES TO PHQ9 QUESTIONS 1 & 2: 1
6. FEELING BAD ABOUT YOURSELF - OR THAT YOU ARE A FAILURE OR HAVE LET YOURSELF OR YOUR FAMILY DOWN: 0
9. THOUGHTS THAT YOU WOULD BE BETTER OFF DEAD, OR OF HURTING YOURSELF: 0
7. TROUBLE CONCENTRATING ON THINGS, SUCH AS READING THE NEWSPAPER OR WATCHING TELEVISION: 0
SUM OF ALL RESPONSES TO PHQ QUESTIONS 1-9: 2

## 2024-02-06 NOTE — PROGRESS NOTES
Post-Discharge Transitional Care Management Progress Note      Sarah Dai   YOB: 1960    Date of Office Visit:  2/6/2024  Date of Hospital Admission: 2/1/2024  Date of Hospital Discharge: 2/3/2024    Care management risk score Rising risk (score 2-5) and Complex Care (Scores >=6): No Risk Score On File     Non face to face  following discharge, date last encounter closed (first attempt may have been earlier): Discharged 2 business days ago    Call initiated 2 business days of discharge: Discharged 2 business days ago    ASSESSMENT/PLAN:   Hospital discharge follow-up  -     LA DISCHARGE MEDS RECONCILED W/ CURRENT OUTPATIENT MED LIST  Acute ischemic left PCA stroke (HCC)  Slowly improving  Tobacco use disorder  Working on quitting  Coronary artery disease involving native coronary artery of native heart with unstable angina pectoris (HCC)  Asymptomatic  Hypertension, essential, benign  Controlled  Dyslipidemia  Asymptomatic    Medical Decision Making: high complexity  Return in 1 month (on 3/6/2024).    On this date 2/6/2024 I have spent 45 minutes reviewing previous notes, test results and face to face with the patient discussing the diagnosis and importance of compliance with the treatment plan as well as documenting on the day of the visit.     Subjective:   HPI:  Follow up of Hospital problems/diagnosis(es): Acute ischemic left PCA stroke, history of CVA, tobacco use, coronary disease, hypertension, hyperlipidemia    Inpatient course: Discharge summary reviewed- see chart.    Interval history/Current status: Slowly improving since discharge.  Facial droop has returned to baseline.  Vision has almost returned to baseline.  Still has some weakness on the right side both upper and lower extremity.  Tolerating her meds well.  She has stopped smoking.  Her last cigarette was 2/1/2024.  Not eating a lot since discharge because after she eats she wants a cigarette.  Has not followed up with

## 2024-03-19 ENCOUNTER — TELEPHONE (OUTPATIENT)
Dept: CARDIOLOGY CLINIC | Age: 64
End: 2024-03-19

## 2024-03-22 ENCOUNTER — TELEPHONE (OUTPATIENT)
Dept: CARDIOLOGY CLINIC | Age: 64
End: 2024-03-22

## 2024-04-18 ENCOUNTER — OFFICE VISIT (OUTPATIENT)
Dept: FAMILY MEDICINE CLINIC | Age: 64
End: 2024-04-18
Payer: COMMERCIAL

## 2024-04-18 ENCOUNTER — OFFICE VISIT (OUTPATIENT)
Dept: CARDIOLOGY CLINIC | Age: 64
End: 2024-04-18
Payer: COMMERCIAL

## 2024-04-18 VITALS
DIASTOLIC BLOOD PRESSURE: 80 MMHG | HEIGHT: 61 IN | SYSTOLIC BLOOD PRESSURE: 126 MMHG | BODY MASS INDEX: 35.5 KG/M2 | WEIGHT: 188 LBS | HEART RATE: 76 BPM

## 2024-04-18 VITALS
OXYGEN SATURATION: 99 % | WEIGHT: 189.4 LBS | SYSTOLIC BLOOD PRESSURE: 128 MMHG | HEART RATE: 72 BPM | DIASTOLIC BLOOD PRESSURE: 78 MMHG | BODY MASS INDEX: 32.51 KG/M2 | TEMPERATURE: 96.9 F

## 2024-04-18 DIAGNOSIS — I47.10 SVT (SUPRAVENTRICULAR TACHYCARDIA) (HCC): ICD-10-CM

## 2024-04-18 DIAGNOSIS — N32.81 OAB (OVERACTIVE BLADDER): ICD-10-CM

## 2024-04-18 DIAGNOSIS — E03.4 HYPOTHYROIDISM DUE TO ACQUIRED ATROPHY OF THYROID: ICD-10-CM

## 2024-04-18 DIAGNOSIS — E66.09 NON MORBID OBESITY DUE TO EXCESS CALORIES: ICD-10-CM

## 2024-04-18 DIAGNOSIS — F33.1 MODERATE EPISODE OF RECURRENT MAJOR DEPRESSIVE DISORDER (HCC): ICD-10-CM

## 2024-04-18 DIAGNOSIS — I83.893 VARICOSE VEINS OF BOTH LEGS WITH EDEMA: Primary | ICD-10-CM

## 2024-04-18 DIAGNOSIS — I10 HYPERTENSION, ESSENTIAL, BENIGN: ICD-10-CM

## 2024-04-18 DIAGNOSIS — I25.110 CORONARY ARTERY DISEASE INVOLVING NATIVE CORONARY ARTERY OF NATIVE HEART WITH UNSTABLE ANGINA PECTORIS (HCC): ICD-10-CM

## 2024-04-18 DIAGNOSIS — E78.5 DYSLIPIDEMIA: ICD-10-CM

## 2024-04-18 DIAGNOSIS — F17.200 TOBACCO DEPENDENCE: ICD-10-CM

## 2024-04-18 DIAGNOSIS — I63.532 ACUTE ISCHEMIC LEFT PCA STROKE (HCC): ICD-10-CM

## 2024-04-18 DIAGNOSIS — I73.00 RAYNAUD'S DISEASE WITHOUT GANGRENE: ICD-10-CM

## 2024-04-18 DIAGNOSIS — I21.4 NSTEMI (NON-ST ELEVATED MYOCARDIAL INFARCTION) (HCC): ICD-10-CM

## 2024-04-18 DIAGNOSIS — I51.3 LV (LEFT VENTRICULAR) MURAL THROMBUS: ICD-10-CM

## 2024-04-18 DIAGNOSIS — J44.9 CHRONIC OBSTRUCTIVE PULMONARY DISEASE, UNSPECIFIED COPD TYPE (HCC): ICD-10-CM

## 2024-04-18 DIAGNOSIS — Z98.890 S/P ABLATION OF ACCESSORY BYPASS TRACT: ICD-10-CM

## 2024-04-18 DIAGNOSIS — J30.89 PERENNIAL ALLERGIC RHINITIS: ICD-10-CM

## 2024-04-18 DIAGNOSIS — F01.B0 MODERATE VASCULAR DEMENTIA WITHOUT BEHAVIORAL DISTURBANCE, PSYCHOTIC DISTURBANCE, MOOD DISTURBANCE, OR ANXIETY (HCC): ICD-10-CM

## 2024-04-18 PROCEDURE — 3074F SYST BP LT 130 MM HG: CPT | Performed by: FAMILY MEDICINE

## 2024-04-18 PROCEDURE — G8417 CALC BMI ABV UP PARAM F/U: HCPCS | Performed by: INTERNAL MEDICINE

## 2024-04-18 PROCEDURE — 4004F PT TOBACCO SCREEN RCVD TLK: CPT | Performed by: INTERNAL MEDICINE

## 2024-04-18 PROCEDURE — 3017F COLORECTAL CA SCREEN DOC REV: CPT | Performed by: INTERNAL MEDICINE

## 2024-04-18 PROCEDURE — 99214 OFFICE O/P EST MOD 30 MIN: CPT | Performed by: FAMILY MEDICINE

## 2024-04-18 PROCEDURE — G2211 COMPLEX E/M VISIT ADD ON: HCPCS | Performed by: FAMILY MEDICINE

## 2024-04-18 PROCEDURE — 3078F DIAST BP <80 MM HG: CPT | Performed by: FAMILY MEDICINE

## 2024-04-18 PROCEDURE — 3074F SYST BP LT 130 MM HG: CPT | Performed by: INTERNAL MEDICINE

## 2024-04-18 PROCEDURE — 3017F COLORECTAL CA SCREEN DOC REV: CPT | Performed by: FAMILY MEDICINE

## 2024-04-18 PROCEDURE — G8427 DOCREV CUR MEDS BY ELIG CLIN: HCPCS | Performed by: INTERNAL MEDICINE

## 2024-04-18 PROCEDURE — 99214 OFFICE O/P EST MOD 30 MIN: CPT | Performed by: INTERNAL MEDICINE

## 2024-04-18 PROCEDURE — 3079F DIAST BP 80-89 MM HG: CPT | Performed by: INTERNAL MEDICINE

## 2024-04-18 PROCEDURE — 3023F SPIROM DOC REV: CPT | Performed by: FAMILY MEDICINE

## 2024-04-18 PROCEDURE — G8428 CUR MEDS NOT DOCUMENT: HCPCS | Performed by: FAMILY MEDICINE

## 2024-04-18 PROCEDURE — G8417 CALC BMI ABV UP PARAM F/U: HCPCS | Performed by: FAMILY MEDICINE

## 2024-04-18 PROCEDURE — 4004F PT TOBACCO SCREEN RCVD TLK: CPT | Performed by: FAMILY MEDICINE

## 2024-04-18 PROCEDURE — 36415 COLL VENOUS BLD VENIPUNCTURE: CPT | Performed by: FAMILY MEDICINE

## 2024-04-18 RX ORDER — ATORVASTATIN CALCIUM 40 MG/1
40 TABLET, FILM COATED ORAL DAILY
Qty: 90 TABLET | Refills: 3 | Status: SHIPPED | OUTPATIENT
Start: 2024-04-18

## 2024-04-18 RX ORDER — OMEGA-3S/DHA/EPA/FISH OIL/D3 300MG-1000
400 CAPSULE ORAL DAILY
Qty: 30 TABLET | Refills: 5 | Status: SHIPPED | OUTPATIENT
Start: 2024-04-18

## 2024-04-18 RX ORDER — ALBUTEROL SULFATE 90 UG/1
2 AEROSOL, METERED RESPIRATORY (INHALATION) 4 TIMES DAILY PRN
Qty: 18 G | Refills: 5 | Status: SHIPPED | OUTPATIENT
Start: 2024-04-18

## 2024-04-18 RX ORDER — AMLODIPINE BESYLATE 5 MG/1
5 TABLET ORAL DAILY
Qty: 90 TABLET | Refills: 1 | Status: SHIPPED | OUTPATIENT
Start: 2024-04-18

## 2024-04-18 RX ORDER — ESCITALOPRAM OXALATE 20 MG/1
20 TABLET ORAL DAILY
Qty: 90 TABLET | Refills: 1 | Status: SHIPPED | OUTPATIENT
Start: 2024-04-18

## 2024-04-18 RX ORDER — CARVEDILOL 6.25 MG/1
6.25 TABLET ORAL 2 TIMES DAILY
Qty: 60 TABLET | Refills: 5 | Status: SHIPPED | OUTPATIENT
Start: 2024-04-18

## 2024-04-18 RX ORDER — ONDANSETRON 4 MG/1
4 TABLET, ORALLY DISINTEGRATING ORAL 3 TIMES DAILY PRN
Qty: 30 TABLET | Refills: 1 | Status: SHIPPED | OUTPATIENT
Start: 2024-04-18

## 2024-04-18 RX ORDER — LEVOTHYROXINE SODIUM 88 UG/1
TABLET ORAL
Qty: 30 TABLET | Refills: 5 | Status: SHIPPED | OUTPATIENT
Start: 2024-04-18

## 2024-04-18 RX ORDER — FLUTICASONE PROPIONATE 220 UG/1
AEROSOL, METERED RESPIRATORY (INHALATION)
Qty: 12 G | Refills: 5 | Status: SHIPPED | OUTPATIENT
Start: 2024-04-18

## 2024-04-18 RX ORDER — ALBUTEROL SULFATE 2.5 MG/3ML
SOLUTION RESPIRATORY (INHALATION)
Qty: 375 ML | Refills: 5 | Status: SHIPPED | OUTPATIENT
Start: 2024-04-18

## 2024-04-18 RX ORDER — FLUTICASONE PROPIONATE 50 MCG
SPRAY, SUSPENSION (ML) NASAL
Qty: 16 G | Refills: 5 | Status: SHIPPED | OUTPATIENT
Start: 2024-04-18

## 2024-04-18 RX ORDER — AMITRIPTYLINE HYDROCHLORIDE 100 MG/1
100 TABLET ORAL EVERY EVENING
Qty: 30 TABLET | Refills: 5 | Status: SHIPPED | OUTPATIENT
Start: 2024-04-18

## 2024-04-18 RX ORDER — SPIRONOLACTONE 25 MG/1
25 TABLET ORAL DAILY
Qty: 90 TABLET | Refills: 1 | Status: SHIPPED | OUTPATIENT
Start: 2024-04-18

## 2024-04-18 SDOH — ECONOMIC STABILITY: FOOD INSECURITY: WITHIN THE PAST 12 MONTHS, YOU WORRIED THAT YOUR FOOD WOULD RUN OUT BEFORE YOU GOT MONEY TO BUY MORE.: NEVER TRUE

## 2024-04-18 SDOH — ECONOMIC STABILITY: FOOD INSECURITY: WITHIN THE PAST 12 MONTHS, THE FOOD YOU BOUGHT JUST DIDN'T LAST AND YOU DIDN'T HAVE MONEY TO GET MORE.: NEVER TRUE

## 2024-04-18 SDOH — ECONOMIC STABILITY: INCOME INSECURITY: HOW HARD IS IT FOR YOU TO PAY FOR THE VERY BASICS LIKE FOOD, HOUSING, MEDICAL CARE, AND HEATING?: NOT HARD AT ALL

## 2024-04-18 ASSESSMENT — PATIENT HEALTH QUESTIONNAIRE - PHQ9
SUM OF ALL RESPONSES TO PHQ9 QUESTIONS 1 & 2: 0
7. TROUBLE CONCENTRATING ON THINGS, SUCH AS READING THE NEWSPAPER OR WATCHING TELEVISION: NOT AT ALL
8. MOVING OR SPEAKING SO SLOWLY THAT OTHER PEOPLE COULD HAVE NOTICED. OR THE OPPOSITE, BEING SO FIGETY OR RESTLESS THAT YOU HAVE BEEN MOVING AROUND A LOT MORE THAN USUAL: NOT AT ALL
5. POOR APPETITE OR OVEREATING: NOT AT ALL
3. TROUBLE FALLING OR STAYING ASLEEP: NOT AT ALL
4. FEELING TIRED OR HAVING LITTLE ENERGY: NOT AT ALL
6. FEELING BAD ABOUT YOURSELF - OR THAT YOU ARE A FAILURE OR HAVE LET YOURSELF OR YOUR FAMILY DOWN: NOT AT ALL
1. LITTLE INTEREST OR PLEASURE IN DOING THINGS: NOT AT ALL
SUM OF ALL RESPONSES TO PHQ QUESTIONS 1-9: 0
10. IF YOU CHECKED OFF ANY PROBLEMS, HOW DIFFICULT HAVE THESE PROBLEMS MADE IT FOR YOU TO DO YOUR WORK, TAKE CARE OF THINGS AT HOME, OR GET ALONG WITH OTHER PEOPLE: NOT DIFFICULT AT ALL
SUM OF ALL RESPONSES TO PHQ QUESTIONS 1-9: 0
2. FEELING DOWN, DEPRESSED OR HOPELESS: NOT AT ALL
9. THOUGHTS THAT YOU WOULD BE BETTER OFF DEAD, OR OF HURTING YOURSELF: NOT AT ALL

## 2024-04-18 NOTE — PATIENT INSTRUCTIONS
We are committed to providing you the best care possible.    If you receive a survey after visiting one of our offices, please take time to share your experience concerning your physician office visit.  These surveys are confidential and no health information about you is shared.    We are eager to improve for you and we are counting on your feedback to help make that happen.      **It is YOUR responsibilty to bring medication bottles and/or updated medication list to EACH APPOINTMENT. This will allow us to better serve you and all your healthcare needs**  Thank you for allowing us to care for you today!   We want to ensure we can follow your treatment plan and we strive to give you the best outcomes and experience possible.   If you ever have a life threatening emergency and call 911 - for an ambulance (EMS)   Our providers can only care for you at:   [facility] or [facility].   Even if you have someone take you or you drive yourself we can only care for you in a [facility] facility. Our providers are not setup at the other healthcare locations!   Please be informed that if you contact our office outside of normal business hours the physician on call cannot help with any scheduling or rescheduling issues, procedure instruction questions or any type of medication issue.    We advise you for any urgent/emergency that you go to the nearest emergency room!    PLEASE CALL OUR OFFICE DURING NORMAL BUSINESS HOURS    Monday - Friday   8 am to 5 pm    [place]: [phone]    [place]: [phone]    [place]:  [phone]    CORONARY ARTERY DISEASE:Yes   clinically stable. Patient is on optimal medical regimen ( see medication list above )  - Patient is currently  asymptomatic from CAD.            - changes in  treatment:   no, On asa & Coreg           - Testing ordered:  no  [place] classification: 1  CARDIOLITE  4/5/2021    Large apical MI, involving adelso apical & Infero apical portions.

## 2024-04-18 NOTE — PROGRESS NOTES
OFFICE PROGRESS NOTES      Sarah is a 63 y.o. female who has    CHIEF COMPLAINT AS FOLLOWS:  CHEST PAIN:  Patient denies any C/O chest pains at this time.                             SOB: Still C/O SOB at this time.               LEG EDEMA: No leg edema                          PALPITATIONS: Denies any C/O Palpitations                                   DIZZINESS: No C/O Dizziness   SYNCOPE: None   OTHER/ ADDITIONAL COMPLAINTS: Recurrent strokes.                                    HPI: Patient is here for F/U on her CAD, CMP, CHF,  Arrhythmia, HTN & Dyslipidemia problems.   CAD: Patient has known CAD. Had angioplasty in the past.  CMP: Patient has known  ischemic CMP. Being treated with guideline recommended medical / device therapy as stated below.   CHF: Patient has known systolic (HFrEF. Patient had been managed with medical regimen as stated below.  Arrhythmia: Patient has known  Supraventricular arrhythmia in the past.  HTN: Patient has known essential HTN. Has been treated with guideline recommended medical / physical/ diet therapy as stated below.  Dyslipidemia: Patient has known mixed dyslipidemia. Has been treated with guideline recommended medical / physical/ diet therapy as stated below.                Current Outpatient Medications   Medication Sig Dispense Refill    amitriptyline (ELAVIL) 100 MG tablet Take 1 tablet by mouth every evening 30 tablet 5    amLODIPine (NORVASC) 5 MG tablet Take 1 tablet by mouth daily 90 tablet 1    escitalopram (LEXAPRO) 20 MG tablet Take 1 tablet by mouth daily 90 tablet 1    fluticasone (FLONASE) 50 MCG/ACT nasal spray instill 2 sprays into each nostril once daily 16 g 5    fluticasone (FLOVENT HFA) 220 MCG/ACT inhaler inhale 2 puffs by mouth and INTO THE LUNGS twice a day 12 g 5    levothyroxine (SYNTHROID) 88 MCG tablet take 1 tablet by mouth once daily 30 tablet 5    Memantine HCl-Donepezil HCl 28-10 MG CP24 Take 1 capsule by mouth daily 30 capsule 5    mirabegron

## 2024-04-18 NOTE — PROGRESS NOTES
SUBJECTIVE: Sarah Dai is a 63 y.o. female here for follow up of hypothyroidism.  Scheduled Meds: levothyroxine 88 mcg daily    Lab Results   Component Value Date    TSH 1.87 04/06/2023     Thyroid ROS: denies fatigue, weight changes, heat/cold intolerance, bowel/skin changes or CVS symptoms.     Hyperlipidemia: Patient presents with hyperlipidemia.  She was tested because screening.  Her last labs showed Total cholesterol of 193, HDL 59, ,  Triglycerides 150.  There is not a family history of hyperlipidemia. There is not a family history of early ischemia heart disease.    OAB on myrbetriq, has good control.    COPD: Patient complains of dyspnea. Symptoms began several years ago. Symptoms chronic dyspnea does worsen with exertion. Sputum is  absent . Fever has been  absent . Patient uses 1 pillows at night. Patient can walk 500 feet before resting. Patient currently is not on home oxygen therapy.. Respiratory history: COPD    Patient states that her right foot has been worse since her bunion surgery.  She is now seeing a new podiatrist.  She has been sent to pain management for the nerve pain.  Currently on Lyrica 75 mg tid.      She has been feeling depressed due to the chronic pain.  Currently on Lexapro 20 mg .  Denies SI/HI.  Symptoms are fairly well controlled.  Stress at home due to her daughter's work schedule.    Patient also has had dementia since her CVA.  Memory seems to have stabilized since she was switched to Namzaric.      OBJECTIVE:   Vitals:    04/18/24 0958   BP: 128/78   Pulse: 72   Temp: 96.9 °F (36.1 °C)   SpO2: 99%       No acute distress.  Alert and Oriented x 3, obese  HEENT: Atraumatic. Normocephalic. PERRLA, EOMI, Conjunctiva clear  Oropharynx clear, mucosa moist.  Nasal mucosa normal  NECK: without thyromegaly, lymphadenopathy, JVD  LUNGS:Clear to ascultation bilaterally.  Breathing comfortably  CARDIOVASCULAR:  Regular rate and rhythm, no murmurs, rubs, or

## 2024-04-19 LAB — TSH SERPL DL<=0.005 MIU/L-ACNC: 1.81 UIU/ML (ref 0.27–4.2)

## 2024-04-26 ENCOUNTER — INITIAL CONSULT (OUTPATIENT)
Dept: CARDIOLOGY CLINIC | Age: 64
End: 2024-04-26
Payer: COMMERCIAL

## 2024-04-26 VITALS
BODY MASS INDEX: 33.09 KG/M2 | WEIGHT: 193.8 LBS | HEART RATE: 71 BPM | HEIGHT: 64 IN | DIASTOLIC BLOOD PRESSURE: 68 MMHG | SYSTOLIC BLOOD PRESSURE: 118 MMHG

## 2024-04-26 DIAGNOSIS — I10 HYPERTENSION, ESSENTIAL, BENIGN: ICD-10-CM

## 2024-04-26 DIAGNOSIS — I63.9 CRYPTOGENIC STROKE (HCC): Primary | ICD-10-CM

## 2024-04-26 PROCEDURE — 3017F COLORECTAL CA SCREEN DOC REV: CPT | Performed by: INTERNAL MEDICINE

## 2024-04-26 PROCEDURE — G8417 CALC BMI ABV UP PARAM F/U: HCPCS | Performed by: INTERNAL MEDICINE

## 2024-04-26 PROCEDURE — G8427 DOCREV CUR MEDS BY ELIG CLIN: HCPCS | Performed by: INTERNAL MEDICINE

## 2024-04-26 PROCEDURE — 93000 ELECTROCARDIOGRAM COMPLETE: CPT | Performed by: INTERNAL MEDICINE

## 2024-04-26 PROCEDURE — 4004F PT TOBACCO SCREEN RCVD TLK: CPT | Performed by: INTERNAL MEDICINE

## 2024-04-26 PROCEDURE — 3078F DIAST BP <80 MM HG: CPT | Performed by: INTERNAL MEDICINE

## 2024-04-26 PROCEDURE — 99204 OFFICE O/P NEW MOD 45 MIN: CPT | Performed by: INTERNAL MEDICINE

## 2024-04-26 PROCEDURE — 3074F SYST BP LT 130 MM HG: CPT | Performed by: INTERNAL MEDICINE

## 2024-04-26 NOTE — PROGRESS NOTES
Electrophysiology reconsult Note      Reason for consultation:  stroke - loop recorder    Chief complaint :  stroke in January    Referring physician:        Primary care physician: Carlos Mckenzie MD      History of Present Illness:     This visit (4/26/2024)      Chief Complaint   Patient presents with    Consultation     Patient had stroke end of January, [facility] recommends Loop Recorder. Patient denies cardiac symptoms - Denies chest pain, shortness of breath, palpitations, syncope or presyncope, edema    Patient denies use of tobacco and alcohol. Patient consumes coffee and tea daily. Patient does not exercise.           Previous visit:  (2/21/2018      Chief Complaint   Patient presents with    Irregular Heart Beat     Mo-f/u SVT ablation 2/12. Patient denies CP, SOB, dizziness. Does still report SOB but states this has been better since ablation. Also reports swelling to both lower legs but this is not a new issue for patient. Dr Crowley recently up'd lasix to 40mg, 2 weeks before ablation. She says this helped but feels there can be more done to help the swelling in her legs.           Previous visit:    Chief Complaint   Patient presents with    Tachycardia     Dr. Crowley patient here to discuss possible SVT ablation. On 10/5/17 patient was to have colonoscopy but her HR was 177.  She was given 6mg Adenosine, 5mg Metoprolol, and 100mcg of Neosynephrine. Patient states she has been feeling her heart \"racing\" 2-3 times a week now. She was started on Cardizem 120 mg daily. She mentions some shortness of breath with tachycardia episodes and with minimal exertion. She had CVA 4/16/2014 from unknown cause and MI x2.    Shortness of Breath     She denies chest pain, dizziness, and edema.          Past medical history:   Past Medical History:   Diagnosis Date    Abnormal nuclear stress test     ACS (acute coronary syndrome) (HCC)     Arterial

## 2024-04-30 ENCOUNTER — TELEPHONE (OUTPATIENT)
Dept: FAMILY MEDICINE CLINIC | Age: 64
End: 2024-04-30

## 2024-04-30 DIAGNOSIS — L50.1 IDIOPATHIC URTICARIA: Primary | ICD-10-CM

## 2024-04-30 NOTE — TELEPHONE ENCOUNTER
[facility] called stated they need a new referral for patient to continue care.      Please advise        Please fax to [phone]

## 2024-05-02 ASSESSMENT — ENCOUNTER SYMPTOMS
NAUSEA: 0
COUGH: 0
WHEEZING: 0
CONSTIPATION: 0
BLOOD IN STOOL: 0
VOMITING: 0
PHOTOPHOBIA: 0
EYE PAIN: 0
COLOR CHANGE: 0
SHORTNESS OF BREATH: 0
BACK PAIN: 0
DIARRHEA: 0
CHEST TIGHTNESS: 0
ABDOMINAL PAIN: 0

## 2024-05-09 ENCOUNTER — TELEPHONE (OUTPATIENT)
Dept: CARDIOLOGY CLINIC | Age: 64
End: 2024-05-09

## 2024-05-09 NOTE — TELEPHONE ENCOUNTER
Called patient to advise of procedure time, patient now scheduled for 5/22 @ 1000 with arrival at 0800.

## 2024-05-14 ENCOUNTER — TELEPHONE (OUTPATIENT)
Dept: FAMILY MEDICINE CLINIC | Age: 64
End: 2024-05-14

## 2024-05-14 NOTE — TELEPHONE ENCOUNTER
Paula [facility] is request a new referral, as this needs to be done every year.      MA Staff:   This needs to be back dated April 29, 2024, on [email or web address]'s online or call once the referral is ordered.      Fax to:  [phone] --[facility].

## 2024-05-17 ENCOUNTER — TELEPHONE (OUTPATIENT)
Dept: CARDIOLOGY CLINIC | Age: 64
End: 2024-05-17

## 2024-05-21 ENCOUNTER — TELEPHONE (OUTPATIENT)
Dept: CARDIOLOGY CLINIC | Age: 64
End: 2024-05-21

## 2024-05-21 NOTE — TELEPHONE ENCOUNTER
Patient's daughter believes this need cancelled. Patient is having swelling in lower legs. Swelling started on 5/19. Patient's daughter also was not sure if insurance was going to pay. Right now Patient's daughter is worried about the swelling.  Please advise.

## 2024-05-22 ENCOUNTER — TELEPHONE (OUTPATIENT)
Dept: CARDIOLOGY CLINIC | Age: 64
End: 2024-05-22

## 2024-05-22 NOTE — TELEPHONE ENCOUNTER
Called patients daughter d/t daughter calling last PM to see if patients procedure was approved by insurance. This nurse was already gone when daughter called. This nurse first thing this AM send Vivi Muñizmons a message asking her if it had been approved. She stated it had, advised daughter while on phone with her that it was approved. Daughter stated will r/s loop but that patient last 4 days has had LE edema bilaterally with +2-+3 pitting edema that will go up to calf. Denies SOB or difficulty with laying down. Daughter stated that patient had \"left over lasix\" and gave her mom 40 mg last pm and that this AM her swelling was a little better but still had +2 pitting edema.   Cath team notified via email that procedure was cancelled.   Advised daughter I will speak with provider and call back on patients edema.   Daughter stated understanding. No other c/o noted.

## 2024-05-24 RX ORDER — SACUBITRIL AND VALSARTAN 24; 26 MG/1; MG/1
1 TABLET, FILM COATED ORAL 2 TIMES DAILY
Qty: 60 TABLET | Refills: 5 | Status: SHIPPED | OUTPATIENT
Start: 2024-05-24

## 2024-07-07 DIAGNOSIS — F33.1 MODERATE EPISODE OF RECURRENT MAJOR DEPRESSIVE DISORDER (HCC): ICD-10-CM

## 2024-07-08 RX ORDER — ESCITALOPRAM OXALATE 20 MG/1
20 TABLET ORAL DAILY
Qty: 90 TABLET | Refills: 1 | OUTPATIENT
Start: 2024-07-08

## 2024-08-27 DIAGNOSIS — I73.00 RAYNAUD'S DISEASE WITHOUT GANGRENE: ICD-10-CM

## 2024-08-27 RX ORDER — AMLODIPINE BESYLATE 5 MG/1
5 TABLET ORAL DAILY
Qty: 90 TABLET | Refills: 0 | OUTPATIENT
Start: 2024-08-27

## 2024-08-27 RX ORDER — SPIRONOLACTONE 25 MG/1
25 TABLET ORAL DAILY
Qty: 90 TABLET | Refills: 1 | Status: SHIPPED | OUTPATIENT
Start: 2024-08-27

## 2024-10-03 ENCOUNTER — OFFICE VISIT (OUTPATIENT)
Dept: FAMILY MEDICINE CLINIC | Age: 64
End: 2024-10-03

## 2024-10-03 VITALS
HEART RATE: 89 BPM | OXYGEN SATURATION: 99 % | WEIGHT: 209.6 LBS | SYSTOLIC BLOOD PRESSURE: 100 MMHG | BODY MASS INDEX: 35.98 KG/M2 | DIASTOLIC BLOOD PRESSURE: 68 MMHG

## 2024-10-03 DIAGNOSIS — F33.1 MODERATE EPISODE OF RECURRENT MAJOR DEPRESSIVE DISORDER (HCC): ICD-10-CM

## 2024-10-03 DIAGNOSIS — I73.00 RAYNAUD'S DISEASE WITHOUT GANGRENE: ICD-10-CM

## 2024-10-03 DIAGNOSIS — G89.29 CHRONIC PAIN IN RIGHT FOOT: ICD-10-CM

## 2024-10-03 DIAGNOSIS — N32.81 OAB (OVERACTIVE BLADDER): ICD-10-CM

## 2024-10-03 DIAGNOSIS — E66.811 OBESITY (BMI 30.0-34.9): ICD-10-CM

## 2024-10-03 DIAGNOSIS — F01.B0 MODERATE VASCULAR DEMENTIA WITHOUT BEHAVIORAL DISTURBANCE, PSYCHOTIC DISTURBANCE, MOOD DISTURBANCE, OR ANXIETY (HCC): ICD-10-CM

## 2024-10-03 DIAGNOSIS — I10 HYPERTENSION, ESSENTIAL, BENIGN: ICD-10-CM

## 2024-10-03 DIAGNOSIS — J44.9 CHRONIC OBSTRUCTIVE PULMONARY DISEASE, UNSPECIFIED COPD TYPE (HCC): ICD-10-CM

## 2024-10-03 DIAGNOSIS — E78.5 DYSLIPIDEMIA: ICD-10-CM

## 2024-10-03 DIAGNOSIS — J30.89 PERENNIAL ALLERGIC RHINITIS: ICD-10-CM

## 2024-10-03 DIAGNOSIS — M79.671 CHRONIC PAIN IN RIGHT FOOT: ICD-10-CM

## 2024-10-03 DIAGNOSIS — F17.200 TOBACCO DEPENDENCE: ICD-10-CM

## 2024-10-03 DIAGNOSIS — Z12.31 BREAST CANCER SCREENING BY MAMMOGRAM: ICD-10-CM

## 2024-10-03 DIAGNOSIS — E03.4 HYPOTHYROIDISM DUE TO ACQUIRED ATROPHY OF THYROID: Primary | ICD-10-CM

## 2024-10-03 RX ORDER — METFORMIN HCL 500 MG
500 TABLET, EXTENDED RELEASE 24 HR ORAL
Qty: 90 TABLET | Refills: 3 | Status: SHIPPED | OUTPATIENT
Start: 2024-10-03 | End: 2025-09-28

## 2024-10-03 RX ORDER — ALBUTEROL SULFATE 90 UG/1
2 INHALANT RESPIRATORY (INHALATION) 4 TIMES DAILY PRN
Qty: 18 G | Refills: 5 | Status: SHIPPED | OUTPATIENT
Start: 2024-10-03

## 2024-10-03 RX ORDER — ESCITALOPRAM OXALATE 20 MG/1
20 TABLET ORAL DAILY
Qty: 90 TABLET | Refills: 1 | Status: SHIPPED | OUTPATIENT
Start: 2024-10-03

## 2024-10-03 RX ORDER — ALBUTEROL SULFATE 0.83 MG/ML
SOLUTION RESPIRATORY (INHALATION)
Qty: 375 ML | Refills: 5 | Status: SHIPPED | OUTPATIENT
Start: 2024-10-03

## 2024-10-03 RX ORDER — AMITRIPTYLINE HYDROCHLORIDE 100 MG/1
100 TABLET ORAL EVERY EVENING
Qty: 30 TABLET | Refills: 5 | Status: SHIPPED | OUTPATIENT
Start: 2024-10-03

## 2024-10-03 RX ORDER — MIRABEGRON 25 MG/1
25 TABLET, FILM COATED, EXTENDED RELEASE ORAL DAILY
Qty: 30 TABLET | Refills: 5 | Status: SHIPPED | OUTPATIENT
Start: 2024-10-03

## 2024-10-03 RX ORDER — FLUTICASONE PROPIONATE 50 MCG
SPRAY, SUSPENSION (ML) NASAL
Qty: 16 G | Refills: 5 | Status: SHIPPED | OUTPATIENT
Start: 2024-10-03

## 2024-10-03 RX ORDER — FLUTICASONE PROPIONATE 220 UG/1
AEROSOL, METERED RESPIRATORY (INHALATION)
Qty: 12 G | Refills: 5 | Status: SHIPPED | OUTPATIENT
Start: 2024-10-03

## 2024-10-03 RX ORDER — ONDANSETRON 4 MG/1
4 TABLET, ORALLY DISINTEGRATING ORAL 3 TIMES DAILY PRN
Qty: 30 TABLET | Refills: 1 | Status: SHIPPED | OUTPATIENT
Start: 2024-10-03

## 2024-10-03 RX ORDER — PREGABALIN 75 MG/1
75 CAPSULE ORAL 3 TIMES DAILY
Qty: 60 CAPSULE | Refills: 5 | Status: SHIPPED | OUTPATIENT
Start: 2024-10-03 | End: 2025-04-01

## 2024-10-03 RX ORDER — AMLODIPINE BESYLATE 5 MG/1
5 TABLET ORAL DAILY
Qty: 90 TABLET | Refills: 1 | Status: SHIPPED | OUTPATIENT
Start: 2024-10-03

## 2024-10-03 RX ORDER — LEVOTHYROXINE SODIUM 88 UG/1
TABLET ORAL
Qty: 30 TABLET | Refills: 5 | Status: SHIPPED | OUTPATIENT
Start: 2024-10-03

## 2024-10-03 RX ORDER — OMEGA-3S/DHA/EPA/FISH OIL/D3 300MG-1000
400 CAPSULE ORAL DAILY
Qty: 30 TABLET | Refills: 5 | Status: SHIPPED | OUTPATIENT
Start: 2024-10-03

## 2024-10-03 NOTE — PROGRESS NOTES
Venipuncture performed on patients left antecubital space to obtain one SST. Patient tolerated well.   
 Breathing comfortably  CARDIOVASCULAR:  Regular rate and rhythm, no murmurs, rubs, or gallops  EXTREMITY: Full range of motion. No clubbing/cyanosis/edema.    NEURO: Cranial nerves II-XII grossly intact.  Strength 5/5, DTR 2/4.  Antalgic gait due to foot pain  SKIN: Warm, Dry, No rash.  PSYCH: Mood and Affect normal.      ASSESSMENT:       Diagnosis Orders   1. Hypothyroidism due to acquired atrophy of thyroid  albuterol sulfate HFA (VENTOLIN HFA) 108 (90 Base) MCG/ACT inhaler   controlled levothyroxine (SYNTHROID) 88 MCG tablet      2. Chronic obstructive pulmonary disease, unspecified COPD type ([facility])  albuterol (PROVENTIL) (2.5 MG/3ML) 0.083% nebulizer solution   controlled fluticasone (FLOVENT HFA) 220 MCG/ACT inhaler      3. Moderate episode of recurrent major depressive disorder ([facility])  amitriptyline (ELAVIL) 100 MG tablet   controlled escitalopram (LEXAPRO) 20 MG tablet      4. Raynaud's disease without gangrene  amLODIPine (NORVASC) 5 MG tablet   controlled   5. Perennial allergic rhinitis  fluticasone (FLONASE) 50 MCG/ACT nasal spray   controlled   6. Moderate vascular dementia without behavioral disturbance, psychotic disturbance, mood disturbance, or anxiety ([facility])  Memantine HCl-Donepezil HCl 28-10 MG CP24   stable   7. OAB (overactive bladder)  mirabegron (MYRBETRIQ) 25 MG TB24   controlled   8. Dyslipidemia  Comprehensive Metabolic Panel   asymptomatic Lipid Panel      9. Hypertension, essential, benign  Comprehensive Metabolic Panel   controlled   10. Breast cancer screening by mammogram  DAYAN SELENA DIGITAL SCREEN BILATERAL PER PROTOCOL      11. Tobacco dependence  ondansetron (ZOFRAN-ODT) 4 MG disintegrating tablet      12. Chronic pain in right foot  pregabalin (LYRICA) 75 MG capsule   Needs improvement   13. Obesity (BMI 30.0-34.9)  metFORMIN (GLUCOPHAGE-XR) 500 MG extended release tablet   Needs improvement             PLAN: Continue all meds at current dose due to their effectiveness.  Restart Lyrica

## 2024-10-04 LAB
ALBUMIN SERPL-MCNC: 4.4 G/DL (ref 3.4–5)
ALBUMIN/GLOB SERPL: 1.7 {RATIO} (ref 1.1–2.2)
ALP SERPL-CCNC: 97 U/L (ref 40–129)
ALT SERPL-CCNC: 18 U/L (ref 10–40)
ANION GAP SERPL CALCULATED.3IONS-SCNC: 14 MMOL/L (ref 3–16)
AST SERPL-CCNC: 21 U/L (ref 15–37)
BILIRUB SERPL-MCNC: 0.3 MG/DL (ref 0–1)
BUN SERPL-MCNC: 13 MG/DL (ref 7–20)
CALCIUM SERPL-MCNC: 10.6 MG/DL (ref 8.3–10.6)
CHLORIDE SERPL-SCNC: 98 MMOL/L (ref 99–110)
CHOLEST SERPL-MCNC: 148 MG/DL (ref 0–199)
CO2 SERPL-SCNC: 26 MMOL/L (ref 21–32)
CREAT SERPL-MCNC: 1.6 MG/DL (ref 0.6–1.2)
GFR SERPLBLD CREATININE-BSD FMLA CKD-EPI: 36 ML/MIN/{1.73_M2}
GLUCOSE SERPL-MCNC: 86 MG/DL (ref 70–99)
HDLC SERPL-MCNC: 50 MG/DL (ref 40–60)
LDLC SERPL CALC-MCNC: 67 MG/DL
POTASSIUM SERPL-SCNC: 4.6 MMOL/L (ref 3.5–5.1)
PROT SERPL-MCNC: 7 G/DL (ref 6.4–8.2)
SODIUM SERPL-SCNC: 138 MMOL/L (ref 136–145)
TRIGL SERPL-MCNC: 153 MG/DL (ref 0–150)
VLDLC SERPL CALC-MCNC: 31 MG/DL

## 2024-10-08 ENCOUNTER — TELEPHONE (OUTPATIENT)
Dept: FAMILY MEDICINE CLINIC | Age: 64
End: 2024-10-08

## 2024-10-08 DIAGNOSIS — G89.29 CHRONIC PAIN IN RIGHT FOOT: Primary | ICD-10-CM

## 2024-10-08 DIAGNOSIS — M79.671 CHRONIC PAIN IN RIGHT FOOT: Primary | ICD-10-CM

## 2024-10-08 RX ORDER — PREGABALIN 25 MG/1
25 CAPSULE ORAL 3 TIMES DAILY
Qty: 90 CAPSULE | Refills: 5 | Status: SHIPPED | OUTPATIENT
Start: 2024-10-08 | End: 2025-04-06

## 2024-10-08 NOTE — TELEPHONE ENCOUNTER
Patient states she took one 75 mg Lyrica last Saturday and states she felt foggy and like she was high. Patient states she remembers in the past she's been on a lower dosage and she is requesting the dosage be lowered.

## 2024-10-17 ENCOUNTER — TELEPHONE (OUTPATIENT)
Dept: CARDIOLOGY CLINIC | Age: 64
End: 2024-10-17

## 2024-10-21 ENCOUNTER — TELEPHONE (OUTPATIENT)
Dept: CARDIOLOGY CLINIC | Age: 64
End: 2024-10-21

## 2024-10-21 NOTE — TELEPHONE ENCOUNTER
Patient very upset. Feels doctor does not listen to her c/o. Pt stated she came 11-23 12-23 and 1-24. On January visit pt brought daughter NP with her. Patient stated daughter also felt dr dismissed pt concerns.  2 weeks following jan visit patient had a stroke that she is just now back to able to care for herself in all things. Patient has since started seeing cardiology else where and will not returned. Wanted to make sure we are aware of her concern of doctor not listening to patient concerns.

## 2024-11-27 DIAGNOSIS — F01.B0 MODERATE VASCULAR DEMENTIA WITHOUT BEHAVIORAL DISTURBANCE, PSYCHOTIC DISTURBANCE, MOOD DISTURBANCE, OR ANXIETY (HCC): ICD-10-CM

## 2024-11-27 RX ORDER — MEMANTINE HYDROCHLORIDE AND DONEPEZIL HYDROCHLORIDE 28; 10 MG/1; MG/1
1 CAPSULE ORAL DAILY
Qty: 30 CAPSULE | Refills: 0 | OUTPATIENT
Start: 2024-11-27

## 2025-01-08 ENCOUNTER — TELEPHONE (OUTPATIENT)
Dept: FAMILY MEDICINE CLINIC | Age: 65
End: 2025-01-08

## 2025-01-08 ASSESSMENT — PATIENT HEALTH QUESTIONNAIRE - PHQ9
4. FEELING TIRED OR HAVING LITTLE ENERGY: NOT AT ALL
4. FEELING TIRED OR HAVING LITTLE ENERGY: NOT AT ALL
6. FEELING BAD ABOUT YOURSELF - OR THAT YOU ARE A FAILURE OR HAVE LET YOURSELF OR YOUR FAMILY DOWN: NOT AT ALL
SUM OF ALL RESPONSES TO PHQ QUESTIONS 1-9: 1
5. POOR APPETITE OR OVEREATING: SEVERAL DAYS
3. TROUBLE FALLING OR STAYING ASLEEP: NOT AT ALL
SUM OF ALL RESPONSES TO PHQ9 QUESTIONS 1 & 2: 0
5. POOR APPETITE OR OVEREATING: SEVERAL DAYS
7. TROUBLE CONCENTRATING ON THINGS, SUCH AS READING THE NEWSPAPER OR WATCHING TELEVISION: NOT AT ALL
9. THOUGHTS THAT YOU WOULD BE BETTER OFF DEAD, OR OF HURTING YOURSELF: NOT AT ALL
9. THOUGHTS THAT YOU WOULD BE BETTER OFF DEAD, OR OF HURTING YOURSELF: NOT AT ALL
10. IF YOU CHECKED OFF ANY PROBLEMS, HOW DIFFICULT HAVE THESE PROBLEMS MADE IT FOR YOU TO DO YOUR WORK, TAKE CARE OF THINGS AT HOME, OR GET ALONG WITH OTHER PEOPLE: NOT DIFFICULT AT ALL
SUM OF ALL RESPONSES TO PHQ QUESTIONS 1-9: 1
SUM OF ALL RESPONSES TO PHQ QUESTIONS 1-9: 1
6. FEELING BAD ABOUT YOURSELF - OR THAT YOU ARE A FAILURE OR HAVE LET YOURSELF OR YOUR FAMILY DOWN: NOT AT ALL
1. LITTLE INTEREST OR PLEASURE IN DOING THINGS: NOT AT ALL
8. MOVING OR SPEAKING SO SLOWLY THAT OTHER PEOPLE COULD HAVE NOTICED. OR THE OPPOSITE - BEING SO FIDGETY OR RESTLESS THAT YOU HAVE BEEN MOVING AROUND A LOT MORE THAN USUAL: NOT AT ALL
1. LITTLE INTEREST OR PLEASURE IN DOING THINGS: NOT AT ALL
10. IF YOU CHECKED OFF ANY PROBLEMS, HOW DIFFICULT HAVE THESE PROBLEMS MADE IT FOR YOU TO DO YOUR WORK, TAKE CARE OF THINGS AT HOME, OR GET ALONG WITH OTHER PEOPLE: NOT DIFFICULT AT ALL
3. TROUBLE FALLING OR STAYING ASLEEP: NOT AT ALL
7. TROUBLE CONCENTRATING ON THINGS, SUCH AS READING THE NEWSPAPER OR WATCHING TELEVISION: NOT AT ALL
8. MOVING OR SPEAKING SO SLOWLY THAT OTHER PEOPLE COULD HAVE NOTICED. OR THE OPPOSITE, BEING SO FIGETY OR RESTLESS THAT YOU HAVE BEEN MOVING AROUND A LOT MORE THAN USUAL: NOT AT ALL
2. FEELING DOWN, DEPRESSED OR HOPELESS: NOT AT ALL
SUM OF ALL RESPONSES TO PHQ QUESTIONS 1-9: 1
2. FEELING DOWN, DEPRESSED OR HOPELESS: NOT AT ALL
SUM OF ALL RESPONSES TO PHQ QUESTIONS 1-9: 1

## 2025-01-08 NOTE — TELEPHONE ENCOUNTER
Spoke to patient and her insurance about her frustrations and she stated she will also discuss these with Dr. Mckenzie tomorrow at her appointment. She stated when she was transferred to the office she was told she would have to pay $100 to be seen. I informed her there is no copay due according to our system so there should not be anything due to be seen tomorrow. I did inform her the system has been generating a self pay fee for a lot of patients but since she has insurance the visit would be billed through her insurance. I informed her if she needs her records to come in to a sign a release of records form and the records could be mailed to her or sent to a new provider. Patient informed she will be seen for her appointment tomorrow and for 30 days for emergencies but it is recommended she start her search for a new provider since she will only be provided care for the next 30 days from this department.

## 2025-01-08 NOTE — TELEPHONE ENCOUNTER
Patient called in frustrated about insurance, asking if you accepted [facility]. I informed patient that we did but she would need to check with them to see if you were in inNetwork doctor for them. Patient proceeded to get angry and start cussing. She stated that she would be by later today to  her records and make an appointment somewhere else.

## 2025-01-08 NOTE — TELEPHONE ENCOUNTER
If the patient wants to  her records she will have to fill out a records request form and we will get them copied and sent to her.  If she chooses to go somewhere else please remove her as my PCP and send her a dismissal letter.

## 2025-01-09 ENCOUNTER — OFFICE VISIT (OUTPATIENT)
Dept: FAMILY MEDICINE CLINIC | Age: 65
End: 2025-01-09
Payer: MEDICAID

## 2025-01-09 VITALS
DIASTOLIC BLOOD PRESSURE: 64 MMHG | SYSTOLIC BLOOD PRESSURE: 100 MMHG | TEMPERATURE: 97.1 F | WEIGHT: 207.2 LBS | BODY MASS INDEX: 35.57 KG/M2 | OXYGEN SATURATION: 98 % | HEART RATE: 73 BPM

## 2025-01-09 DIAGNOSIS — F01.B0 MODERATE VASCULAR DEMENTIA WITHOUT BEHAVIORAL DISTURBANCE, PSYCHOTIC DISTURBANCE, MOOD DISTURBANCE, OR ANXIETY (HCC): ICD-10-CM

## 2025-01-09 DIAGNOSIS — G89.29 CHRONIC PAIN IN RIGHT FOOT: ICD-10-CM

## 2025-01-09 DIAGNOSIS — M79.671 CHRONIC PAIN IN RIGHT FOOT: ICD-10-CM

## 2025-01-09 DIAGNOSIS — J44.9 CHRONIC OBSTRUCTIVE PULMONARY DISEASE, UNSPECIFIED COPD TYPE (HCC): ICD-10-CM

## 2025-01-09 DIAGNOSIS — F33.1 MODERATE EPISODE OF RECURRENT MAJOR DEPRESSIVE DISORDER (HCC): ICD-10-CM

## 2025-01-09 DIAGNOSIS — I73.00 RAYNAUD'S DISEASE WITHOUT GANGRENE: ICD-10-CM

## 2025-01-09 DIAGNOSIS — J30.89 PERENNIAL ALLERGIC RHINITIS: ICD-10-CM

## 2025-01-09 DIAGNOSIS — E66.811 OBESITY (BMI 30.0-34.9): ICD-10-CM

## 2025-01-09 DIAGNOSIS — E03.4 HYPOTHYROIDISM DUE TO ACQUIRED ATROPHY OF THYROID: ICD-10-CM

## 2025-01-09 DIAGNOSIS — N32.81 OAB (OVERACTIVE BLADDER): ICD-10-CM

## 2025-01-09 PROBLEM — I25.2 HISTORY OF ANTERIOR WALL MYOCARDIAL INFARCTION: Status: ACTIVE | Noted: 2025-01-09

## 2025-01-09 PROBLEM — Z86.73 HISTORY OF STROKE: Status: ACTIVE | Noted: 2025-01-09

## 2025-01-09 PROBLEM — I25.5 ISCHEMIC CARDIOMYOPATHY: Status: ACTIVE | Noted: 2025-01-09

## 2025-01-09 PROCEDURE — 3017F COLORECTAL CA SCREEN DOC REV: CPT | Performed by: FAMILY MEDICINE

## 2025-01-09 PROCEDURE — 3074F SYST BP LT 130 MM HG: CPT | Performed by: FAMILY MEDICINE

## 2025-01-09 PROCEDURE — 3023F SPIROM DOC REV: CPT | Performed by: FAMILY MEDICINE

## 2025-01-09 PROCEDURE — G8428 CUR MEDS NOT DOCUMENT: HCPCS | Performed by: FAMILY MEDICINE

## 2025-01-09 PROCEDURE — 3078F DIAST BP <80 MM HG: CPT | Performed by: FAMILY MEDICINE

## 2025-01-09 PROCEDURE — 99214 OFFICE O/P EST MOD 30 MIN: CPT | Performed by: FAMILY MEDICINE

## 2025-01-09 PROCEDURE — G8417 CALC BMI ABV UP PARAM F/U: HCPCS | Performed by: FAMILY MEDICINE

## 2025-01-09 PROCEDURE — 1036F TOBACCO NON-USER: CPT | Performed by: FAMILY MEDICINE

## 2025-01-09 RX ORDER — AMLODIPINE BESYLATE 5 MG/1
5 TABLET ORAL DAILY
Qty: 90 TABLET | Refills: 1 | Status: SHIPPED | OUTPATIENT
Start: 2025-01-09

## 2025-01-09 RX ORDER — ESCITALOPRAM OXALATE 20 MG/1
20 TABLET ORAL DAILY
Qty: 90 TABLET | Refills: 1 | Status: SHIPPED | OUTPATIENT
Start: 2025-01-09

## 2025-01-09 RX ORDER — MIRABEGRON 50 MG/1
50 TABLET, FILM COATED, EXTENDED RELEASE ORAL DAILY
Qty: 90 TABLET | Refills: 1 | Status: SHIPPED | OUTPATIENT
Start: 2025-01-09

## 2025-01-09 RX ORDER — METFORMIN HYDROCHLORIDE 500 MG/1
500 TABLET, EXTENDED RELEASE ORAL
Qty: 90 TABLET | Refills: 3 | Status: SHIPPED | OUTPATIENT
Start: 2025-01-09 | End: 2026-01-04

## 2025-01-09 RX ORDER — ALBUTEROL SULFATE 90 UG/1
2 INHALANT RESPIRATORY (INHALATION) 4 TIMES DAILY PRN
Qty: 18 G | Refills: 5 | Status: SHIPPED | OUTPATIENT
Start: 2025-01-09

## 2025-01-09 RX ORDER — AMITRIPTYLINE HYDROCHLORIDE 100 MG/1
100 TABLET ORAL EVERY EVENING
Qty: 30 TABLET | Refills: 5 | Status: SHIPPED | OUTPATIENT
Start: 2025-01-09

## 2025-01-09 RX ORDER — OMEGA-3S/DHA/EPA/FISH OIL/D3 300MG-1000
400 CAPSULE ORAL DAILY
Qty: 30 TABLET | Refills: 5 | Status: SHIPPED | OUTPATIENT
Start: 2025-01-09

## 2025-01-09 RX ORDER — FLUTICASONE PROPIONATE 220 UG/1
AEROSOL, METERED RESPIRATORY (INHALATION)
Qty: 12 G | Refills: 5 | Status: SHIPPED | OUTPATIENT
Start: 2025-01-09

## 2025-01-09 RX ORDER — ALBUTEROL SULFATE 0.83 MG/ML
SOLUTION RESPIRATORY (INHALATION)
Qty: 375 ML | Refills: 5 | Status: SHIPPED | OUTPATIENT
Start: 2025-01-09

## 2025-01-09 RX ORDER — PREGABALIN 25 MG/1
25 CAPSULE ORAL 3 TIMES DAILY
Qty: 90 CAPSULE | Refills: 5 | Status: SHIPPED | OUTPATIENT
Start: 2025-01-09 | End: 2025-07-08

## 2025-01-09 RX ORDER — FLUTICASONE PROPIONATE 50 MCG
SPRAY, SUSPENSION (ML) NASAL
Qty: 16 G | Refills: 5 | Status: SHIPPED | OUTPATIENT
Start: 2025-01-09

## 2025-01-09 RX ORDER — LEVOTHYROXINE SODIUM 88 UG/1
TABLET ORAL
Qty: 30 TABLET | Refills: 5 | Status: SHIPPED | OUTPATIENT
Start: 2025-01-09

## 2025-01-09 SDOH — ECONOMIC STABILITY: FOOD INSECURITY: WITHIN THE PAST 12 MONTHS, YOU WORRIED THAT YOUR FOOD WOULD RUN OUT BEFORE YOU GOT MONEY TO BUY MORE.: NEVER TRUE

## 2025-01-09 SDOH — ECONOMIC STABILITY: FOOD INSECURITY: WITHIN THE PAST 12 MONTHS, THE FOOD YOU BOUGHT JUST DIDN'T LAST AND YOU DIDN'T HAVE MONEY TO GET MORE.: NEVER TRUE

## 2025-01-09 NOTE — PATIENT INSTRUCTIONS
[facility] Food Resources*  (Call [employer] for more resources.)    [employer] ([place]):  What they offer:  Information on food pantries, mobile food pantries(locations throughout [place], [place] and [facility]), summer meal programs, and senior programs  Address: [address] [place] [address]  Phone Number: [phone]  Website: https://www.[employer].org  [employer] [facility] ([place])  What they offer: drive thru food pantry Saturdays 10:00am - 12:00pm. Available to those in the [place], [place], and [place] area. Limit one visit per household a month.   Address: [address], [place] [address]  Phone: [phone]   [facility] S[employer] ([place]):  What they offer: provide a card used like cash to purchase healthy food items at approved retailer, must meet income requirements.   Phone number: [phone]  Ways to Apply: complete application by phone, in person, or online  Website: [email or web address]  [employer] [facility]):   What they offer:  food pantry, soup kitchen, clothing, emergency shelter, tutoring  Address: [address] [place] [address]  Phone Number: [phone]  Website: [email or web address]          [employer] ([place]):  What they offer: food pantry providing food boxes Monday - Thursday, 9:00am - 12:00pm.  Address[address], [place] [address]  Phone number: [phone]  [employer] ([place]):   What they offer: meals on wheels  Phone Number:  [phone]  Website: www.[employer].org/programs/meals-on-wheels  [employer] ([place])   What they offer: Food distribution every 2nd Sunday of the month from 3:00pm - 5:00pm at [facility]  Address: [address] [place] [address]  Phone: [phone]  [facility] SNAP ([place]):   What they offer: provide a card used like cash to purchase healthy food

## 2025-01-09 NOTE — PROGRESS NOTES
History of Present Illness  The patient presents for evaluation of hypertension, hypothyroidism, weight management, COPD, depression, sleep issues, pain management, overactive bladder, and memory issues.    She has been experiencing difficulties with her insurance provider, [employer], regarding coverage for her annual checkup and medication refills. She reports that she was informed that her co-pay would be $ 150, despite being on public assistance and Medicare, which typically do not require a co-pay for annual checkups.     She is currently on amlodipine 5 mg daily for hypertension management, which she tolerates well. She reports no chest pain or shortness of breath. She also reports no lightheadedness today.    She is currently on levothyroxine for thyroid management. She reports significant weight gain, which she attributes to a minor stroke she experienced in January 2024. Despite this, she reports feeling well overall.    She has been making efforts to improve her health, including walking her dog, which has resulted in a weight loss from 215 pounds to 207 pounds.  She is currently on metformin 500 mg XR daily to help with this.    She is currently on Flovent and albuterol for COPD management. She has not been utilizing her nebulizer frequently but keeps it available for use as needed. She plans to contact us if she requires a refill or if the medication expires.    She reports that her depression is not well-managed on Lexapro 20 mg. She has been feeling unproductive and unable to engage in activities she enjoys. However, she notes an improvement in her ability to perform tasks recently.    She is currently on amitriptyline for sleep management.    She is on Lyrica three times daily for pain management following traumatic foot surgery. She is requesting a refill of this medication.    She is currently on Myrbetriq for overactive bladder, which she reports as ineffective. She is

## 2025-04-28 DIAGNOSIS — N32.81 OAB (OVERACTIVE BLADDER): ICD-10-CM

## 2025-04-28 RX ORDER — MIRABEGRON 50 MG/1
50 TABLET, FILM COATED, EXTENDED RELEASE ORAL DAILY
Qty: 90 TABLET | Refills: 0 | OUTPATIENT
Start: 2025-04-28

## (undated) DEVICE — NEEDLE HYPO 25GA L1.5IN BLU POLYPR HUB S STL REG BVL STR

## (undated) DEVICE — SUTURE VCRL SZ 3-0 L27IN ABSRB UD L26MM SH 1/2 CIR J416H

## (undated) DEVICE — LEGGINGS, PAIR, CLEAR, STERILE: Brand: MEDLINE

## (undated) DEVICE — MARKER SURG SKIN UTIL REGULAR/FINE 2 TIP W/ RUL AND 9 LBL

## (undated) DEVICE — SYRINGE MED 10ML LUERLOCK TIP W/O SFTY DISP

## (undated) DEVICE — TRAY PREP DRY W/ PREM GLV 2 APPL 6 SPNG 2 UNDPD 1 OVERWRAP

## (undated) DEVICE — SHEET,DRAPE,UNDERBUTTOCK,GRAD POUCH,PORT: Brand: MEDLINE

## (undated) DEVICE — SUTURE MCRYL SZ 4-0 L18IN ABSRB UD L19MM PS-2 3/8 CIR PRIM Y496G

## (undated) DEVICE — GLOVE ORANGE PI 7   MSG9070

## (undated) DEVICE — PACK,BASIC,IX: Brand: MEDLINE

## (undated) DEVICE — COUNTER NDL 30 COUNT FOAM STRP SGL MAG

## (undated) DEVICE — TUBING, SUCTION, 9/32" X 10', STRAIGHT: Brand: MEDLINE

## (undated) DEVICE — SUTURE MCRYL SZ 3-0 L27IN ABSRB UD L24MM PS-1 3/8 CIR PRIM Y936H

## (undated) DEVICE — INTENDED FOR TISSUE SEPARATION, AND OTHER PROCEDURES THAT REQUIRE A SHARP SURGICAL BLADE TO PUNCTURE OR CUT.: Brand: BARD-PARKER ® STAINLESS STEEL BLADES

## (undated) DEVICE — YANKAUER,FLEXIBLE HANDLE,REGLR CAPACITY: Brand: MEDLINE INDUSTRIES, INC.

## (undated) DEVICE — MARKER SURG SKIN UTIL BLK REG TIP NONSMEARING W/ 6IN RUL

## (undated) DEVICE — SHEET,DRAPE,40X58,STERILE: Brand: MEDLINE

## (undated) DEVICE — Z INACTIVE USE 2863041 SPONGE GZ W4XL4IN 100% COT 16 PLY RADPQ HIGHLY ABSRB

## (undated) DEVICE — MATERNITY PAD,HEAVY: Brand: CURITY